# Patient Record
Sex: FEMALE | Race: WHITE | Employment: OTHER | URBAN - METROPOLITAN AREA
[De-identification: names, ages, dates, MRNs, and addresses within clinical notes are randomized per-mention and may not be internally consistent; named-entity substitution may affect disease eponyms.]

---

## 2017-05-12 ENCOUNTER — EMPLOYEE WELLNESS (OUTPATIENT)
Dept: OTHER | Age: 57
End: 2017-05-12

## 2017-05-12 LAB
CHOLESTEROL, TOTAL: 190 MG/DL (ref 0–199)
GLUCOSE BLD-MCNC: 78 MG/DL (ref 74–107)
HDLC SERPL-MCNC: 68 MG/DL
LDL CHOLESTEROL CALCULATED: 109 MG/DL (ref 0–99)
TRIGL SERPL-MCNC: 67 MG/DL (ref 0–149)

## 2018-03-20 VITALS — WEIGHT: 163 LBS | BODY MASS INDEX: 26.31 KG/M2

## 2018-04-16 ENCOUNTER — EMPLOYEE WELLNESS (OUTPATIENT)
Dept: OTHER | Age: 58
End: 2018-04-16

## 2018-04-16 LAB
CHOLESTEROL, TOTAL: 176 MG/DL (ref 0–199)
GLUCOSE BLD-MCNC: 93 MG/DL (ref 74–107)
HDLC SERPL-MCNC: 62 MG/DL
LDL CHOLESTEROL CALCULATED: 91 MG/DL (ref 0–99)
TRIGL SERPL-MCNC: 114 MG/DL (ref 0–149)

## 2018-04-23 VITALS — WEIGHT: 164 LBS | BODY MASS INDEX: 26.07 KG/M2

## 2018-06-11 ENCOUNTER — HOSPITAL ENCOUNTER (OUTPATIENT)
Age: 58
Discharge: HOME | End: 2018-06-13
Payer: COMMERCIAL

## 2018-06-11 PROCEDURE — 88175 CYTOPATH C/V AUTO FLUID REDO: CPT

## 2018-07-23 ENCOUNTER — HOSPITAL ENCOUNTER (OUTPATIENT)
Age: 58
Discharge: HOME | End: 2018-07-23
Payer: COMMERCIAL

## 2018-07-23 LAB
ALBUMIN SERPL-MCNC: 3.8 G/DL (ref 3.5–5.2)
ALP BLD-CCNC: 75 U/L (ref 35–104)
ALT SERPL-CCNC: 24 U/L (ref 0–32)
ANION GAP SERPL CALCULATED.3IONS-SCNC: 11 MMOL/L (ref 7–16)
AST SERPL-CCNC: 20 U/L (ref 0–31)
BILIRUB SERPL-MCNC: 0.5 MG/DL (ref 0–1.2)
BILIRUBIN URINE: NEGATIVE
BLOOD, URINE: NEGATIVE
BUN BLDV-MCNC: 17 MG/DL (ref 6–20)
CALCIUM SERPL-MCNC: 9.1 MG/DL (ref 8.6–10.2)
CHLORIDE BLD-SCNC: 104 MMOL/L (ref 98–107)
CLARITY: CLEAR
CO2: 28 MMOL/L (ref 22–29)
COLOR: YELLOW
CREAT SERPL-MCNC: 1 MG/DL (ref 0.5–1)
GFR AFRICAN AMERICAN: >60
GFR NON-AFRICAN AMERICAN: 57 ML/MIN/1.73
GLUCOSE BLD-MCNC: 88 MG/DL (ref 74–109)
GLUCOSE URINE: NEGATIVE MG/DL
HCT VFR BLD CALC: 42.9 % (ref 34–48)
HEMOGLOBIN: 13.7 G/DL (ref 11.5–15.5)
KETONES, URINE: NEGATIVE MG/DL
LEUKOCYTE ESTERASE, URINE: NEGATIVE
MCH RBC QN AUTO: 29.3 PG (ref 26–35)
MCHC RBC AUTO-ENTMCNC: 31.9 % (ref 32–34.5)
MCV RBC AUTO: 91.9 FL (ref 80–99.9)
NITRITE, URINE: NEGATIVE
PDW BLD-RTO: 12.5 FL (ref 11.5–15)
PH UA: 7.5 (ref 5–9)
PLATELET # BLD: 251 E9/L (ref 130–450)
PMV BLD AUTO: 10 FL (ref 7–12)
POTASSIUM SERPL-SCNC: 4.5 MMOL/L (ref 3.5–5)
PROTEIN UA: NEGATIVE MG/DL
RBC # BLD: 4.67 E12/L (ref 3.5–5.5)
SODIUM BLD-SCNC: 143 MMOL/L (ref 132–146)
SPECIFIC GRAVITY UA: 1.01 (ref 1–1.03)
TOTAL PROTEIN: 6.7 G/DL (ref 6.4–8.3)
UROBILINOGEN, URINE: 0.2 E.U./DL
WBC # BLD: 6 E9/L (ref 4.5–11.5)

## 2018-07-23 PROCEDURE — 80053 COMPREHEN METABOLIC PANEL: CPT

## 2018-07-23 PROCEDURE — 81003 URINALYSIS AUTO W/O SCOPE: CPT

## 2018-07-23 PROCEDURE — 85027 COMPLETE CBC AUTOMATED: CPT

## 2018-07-23 PROCEDURE — 36415 COLL VENOUS BLD VENIPUNCTURE: CPT

## 2018-10-02 ENCOUNTER — HOSPITAL ENCOUNTER (OUTPATIENT)
Dept: GENERAL RADIOLOGY | Age: 58
Discharge: HOME | End: 2018-10-04
Payer: COMMERCIAL

## 2018-10-02 DIAGNOSIS — Z12.39 SCREENING FOR BREAST CANCER: ICD-10-CM

## 2018-10-02 PROCEDURE — 77063 BREAST TOMOSYNTHESIS BI: CPT

## 2019-01-07 ENCOUNTER — HOSPITAL ENCOUNTER (OUTPATIENT)
Age: 59
Discharge: HOME | End: 2019-01-07
Payer: COMMERCIAL

## 2019-01-07 LAB
BUN BLDV-MCNC: 21 MG/DL (ref 6–20)
CREAT SERPL-MCNC: 1.1 MG/DL (ref 0.5–1)
GFR AFRICAN AMERICAN: >60
GFR NON-AFRICAN AMERICAN: 51 ML/MIN/1.73
MAGNESIUM: 2.3 MG/DL (ref 1.6–2.6)

## 2019-01-07 PROCEDURE — 82565 ASSAY OF CREATININE: CPT

## 2019-01-07 PROCEDURE — 36415 COLL VENOUS BLD VENIPUNCTURE: CPT

## 2019-01-07 PROCEDURE — 83735 ASSAY OF MAGNESIUM: CPT

## 2019-01-07 PROCEDURE — 84520 ASSAY OF UREA NITROGEN: CPT

## 2019-01-29 ENCOUNTER — HOSPITAL ENCOUNTER (OUTPATIENT)
Age: 59
Discharge: HOME | End: 2019-01-29
Payer: COMMERCIAL

## 2019-01-29 LAB
BUN BLDV-MCNC: 21 MG/DL (ref 6–20)
CREAT SERPL-MCNC: 1 MG/DL (ref 0.5–1)
GFR AFRICAN AMERICAN: >60
GFR NON-AFRICAN AMERICAN: 57 ML/MIN/1.73

## 2019-01-29 PROCEDURE — 82565 ASSAY OF CREATININE: CPT

## 2019-01-29 PROCEDURE — 36415 COLL VENOUS BLD VENIPUNCTURE: CPT

## 2019-01-29 PROCEDURE — 84520 ASSAY OF UREA NITROGEN: CPT

## 2019-02-12 ENCOUNTER — HOSPITAL ENCOUNTER (OUTPATIENT)
Dept: ULTRASOUND IMAGING | Age: 59
Discharge: HOME | End: 2019-02-14
Payer: COMMERCIAL

## 2019-02-12 DIAGNOSIS — N18.9 CHRONIC RENAL IMPAIRMENT, UNSPECIFIED CKD STAGE: ICD-10-CM

## 2019-02-12 PROCEDURE — 76770 US EXAM ABDO BACK WALL COMP: CPT

## 2019-04-08 ENCOUNTER — EMPLOYEE WELLNESS (OUTPATIENT)
Dept: OTHER | Age: 59
End: 2019-04-08

## 2019-04-08 LAB
CHOLESTEROL, TOTAL: 167 MG/DL (ref 0–199)
GLUCOSE BLD-MCNC: 86 MG/DL (ref 74–107)
HDLC SERPL-MCNC: 51 MG/DL
LDL CHOLESTEROL CALCULATED: 95 MG/DL (ref 0–99)
TRIGL SERPL-MCNC: 106 MG/DL (ref 0–149)

## 2019-04-15 VITALS — WEIGHT: 166 LBS | BODY MASS INDEX: 26.79 KG/M2

## 2019-06-17 ENCOUNTER — HOSPITAL ENCOUNTER (OUTPATIENT)
Age: 59
Discharge: HOME | End: 2019-06-19
Payer: COMMERCIAL

## 2019-06-17 PROCEDURE — 88175 CYTOPATH C/V AUTO FLUID REDO: CPT

## 2020-04-30 ENCOUNTER — HOSPITAL ENCOUNTER (OUTPATIENT)
Age: 60
Discharge: HOME | End: 2020-05-02
Payer: COMMERCIAL

## 2020-04-30 PROCEDURE — U0003 INFECTIOUS AGENT DETECTION BY NUCLEIC ACID (DNA OR RNA); SEVERE ACUTE RESPIRATORY SYNDROME CORONAVIRUS 2 (SARS-COV-2) (CORONAVIRUS DISEASE [COVID-19]), AMPLIFIED PROBE TECHNIQUE, MAKING USE OF HIGH THROUGHPUT TECHNOLOGIES AS DESCRIBED BY CMS-2020-01-R: HCPCS

## 2020-05-02 LAB
SARS-COV-2: NOT DETECTED
SOURCE: NORMAL

## 2020-06-24 ENCOUNTER — HOSPITAL ENCOUNTER (OUTPATIENT)
Age: 60
Discharge: HOME | End: 2020-06-26
Payer: COMMERCIAL

## 2020-06-24 PROCEDURE — 88175 CYTOPATH C/V AUTO FLUID REDO: CPT

## 2020-10-05 ENCOUNTER — HOSPITAL ENCOUNTER (OUTPATIENT)
Age: 60
Discharge: HOME | End: 2020-10-07
Payer: COMMERCIAL

## 2020-10-05 ENCOUNTER — HOSPITAL ENCOUNTER (OUTPATIENT)
Dept: GENERAL RADIOLOGY | Age: 60
Discharge: HOME | End: 2020-10-07
Payer: COMMERCIAL

## 2020-10-05 ENCOUNTER — HOSPITAL ENCOUNTER (OUTPATIENT)
Age: 60
Discharge: HOME | End: 2020-10-05
Payer: COMMERCIAL

## 2020-10-05 LAB
ALBUMIN SERPL-MCNC: 3.8 G/DL (ref 3.5–5.2)
ALP BLD-CCNC: 73 U/L (ref 35–104)
ALT SERPL-CCNC: 21 U/L (ref 0–32)
ANION GAP SERPL CALCULATED.3IONS-SCNC: 8 MMOL/L (ref 7–16)
AST SERPL-CCNC: 23 U/L (ref 0–31)
BILIRUB SERPL-MCNC: 0.6 MG/DL (ref 0–1.2)
BILIRUBIN URINE: NEGATIVE
BLOOD, URINE: NEGATIVE
BUN BLDV-MCNC: 20 MG/DL (ref 8–23)
CALCIUM SERPL-MCNC: 9.3 MG/DL (ref 8.6–10.2)
CHLORIDE BLD-SCNC: 104 MMOL/L (ref 98–107)
CLARITY: CLEAR
CO2: 27 MMOL/L (ref 22–29)
COLOR: YELLOW
CREAT SERPL-MCNC: 1.1 MG/DL (ref 0.5–1)
GFR AFRICAN AMERICAN: >60
GFR NON-AFRICAN AMERICAN: 51 ML/MIN/1.73
GLUCOSE BLD-MCNC: 95 MG/DL (ref 74–99)
GLUCOSE URINE: NEGATIVE MG/DL
HCT VFR BLD CALC: 44 % (ref 34–48)
HEMOGLOBIN: 14.8 G/DL (ref 11.5–15.5)
KETONES, URINE: NEGATIVE MG/DL
LEUKOCYTE ESTERASE, URINE: NEGATIVE
MCH RBC QN AUTO: 31.2 PG (ref 26–35)
MCHC RBC AUTO-ENTMCNC: 33.6 % (ref 32–34.5)
MCV RBC AUTO: 92.6 FL (ref 80–99.9)
NITRITE, URINE: NEGATIVE
PDW BLD-RTO: 11.9 FL (ref 11.5–15)
PH UA: 7.5 (ref 5–9)
PLATELET # BLD: 167 E9/L (ref 130–450)
PMV BLD AUTO: 10.2 FL (ref 7–12)
POTASSIUM SERPL-SCNC: 4.5 MMOL/L (ref 3.5–5)
PROTEIN UA: NEGATIVE MG/DL
RBC # BLD: 4.75 E12/L (ref 3.5–5.5)
SODIUM BLD-SCNC: 139 MMOL/L (ref 132–146)
SPECIFIC GRAVITY UA: 1.01 (ref 1–1.03)
TOTAL PROTEIN: 7.1 G/DL (ref 6.4–8.3)
UROBILINOGEN, URINE: 0.2 E.U./DL
WBC # BLD: 5.2 E9/L (ref 4.5–11.5)

## 2020-10-05 PROCEDURE — 73560 X-RAY EXAM OF KNEE 1 OR 2: CPT

## 2020-10-05 PROCEDURE — 81003 URINALYSIS AUTO W/O SCOPE: CPT

## 2020-10-05 PROCEDURE — 85027 COMPLETE CBC AUTOMATED: CPT

## 2020-10-05 PROCEDURE — 80053 COMPREHEN METABOLIC PANEL: CPT

## 2020-10-05 PROCEDURE — 73502 X-RAY EXAM HIP UNI 2-3 VIEWS: CPT

## 2020-10-05 PROCEDURE — 36415 COLL VENOUS BLD VENIPUNCTURE: CPT

## 2020-11-16 ENCOUNTER — HOSPITAL ENCOUNTER (OUTPATIENT)
Dept: GENERAL RADIOLOGY | Age: 60
Discharge: HOME | End: 2020-11-18
Payer: COMMERCIAL

## 2020-11-16 PROCEDURE — 77063 BREAST TOMOSYNTHESIS BI: CPT

## 2020-11-18 ENCOUNTER — HOSPITAL ENCOUNTER (OUTPATIENT)
Age: 60
Discharge: HOME | End: 2020-11-18
Payer: COMMERCIAL

## 2020-11-18 LAB
BUN BLDV-MCNC: 16 MG/DL (ref 8–23)
CREAT SERPL-MCNC: 1.1 MG/DL (ref 0.5–1)
GFR AFRICAN AMERICAN: >60
GFR NON-AFRICAN AMERICAN: 51 ML/MIN/1.73

## 2020-11-18 PROCEDURE — 84520 ASSAY OF UREA NITROGEN: CPT

## 2020-11-18 PROCEDURE — 36415 COLL VENOUS BLD VENIPUNCTURE: CPT

## 2020-11-18 PROCEDURE — 82565 ASSAY OF CREATININE: CPT

## 2021-09-09 LAB
CHOLESTEROL, TOTAL: 181 MG/DL (ref 0–199)
GLUCOSE BLD-MCNC: 87 MG/DL (ref 74–107)
HDLC SERPL-MCNC: 63 MG/DL
LDL CHOLESTEROL CALCULATED: 101 MG/DL (ref 0–99)
TRIGL SERPL-MCNC: 87 MG/DL (ref 0–149)

## 2021-11-24 ENCOUNTER — HOSPITAL ENCOUNTER (OUTPATIENT)
Dept: GENERAL RADIOLOGY | Age: 61
Discharge: HOME | End: 2021-11-26
Payer: COMMERCIAL

## 2021-11-24 DIAGNOSIS — Z12.31 BREAST CANCER SCREENING BY MAMMOGRAM: ICD-10-CM

## 2021-11-24 PROCEDURE — 77063 BREAST TOMOSYNTHESIS BI: CPT

## 2021-12-29 ENCOUNTER — NURSE ONLY (OUTPATIENT)
Dept: PRIMARY CARE CLINIC | Age: 61
End: 2021-12-29

## 2022-08-29 ENCOUNTER — HOSPITAL ENCOUNTER (OUTPATIENT)
Dept: GENERAL RADIOLOGY | Age: 62
Discharge: HOME | End: 2022-08-31
Payer: COMMERCIAL

## 2022-08-29 ENCOUNTER — HOSPITAL ENCOUNTER (OUTPATIENT)
Age: 62
Discharge: HOME | End: 2022-08-31
Payer: COMMERCIAL

## 2022-08-29 DIAGNOSIS — R52 PAIN: ICD-10-CM

## 2022-08-29 PROCEDURE — 73030 X-RAY EXAM OF SHOULDER: CPT

## 2022-08-29 PROCEDURE — 73560 X-RAY EXAM OF KNEE 1 OR 2: CPT

## 2022-09-06 ENCOUNTER — EVALUATION (OUTPATIENT)
Dept: PHYSICAL THERAPY | Age: 62
End: 2022-09-06
Payer: COMMERCIAL

## 2022-09-06 DIAGNOSIS — M25.561 RIGHT KNEE PAIN, UNSPECIFIED CHRONICITY: ICD-10-CM

## 2022-09-06 DIAGNOSIS — M25.511 RIGHT SHOULDER PAIN, UNSPECIFIED CHRONICITY: Primary | ICD-10-CM

## 2022-09-06 PROCEDURE — 97110 THERAPEUTIC EXERCISES: CPT | Performed by: PHYSICAL THERAPIST

## 2022-09-06 PROCEDURE — 97161 PT EVAL LOW COMPLEX 20 MIN: CPT | Performed by: PHYSICAL THERAPIST

## 2022-09-06 NOTE — PROGRESS NOTES
[facility]          Phone: [phone] Fax: [phone]    Physical Therapy Daily Treatment Note  Date:  2022    Patient Name:  Alyce Gómez    :  1960  MRN: [id number]    Evaluating Therapist:  Carito Bravo PT [id number]    Restrictions/Precautions:    Diagnosis:     Diagnosis Orders   1. Right shoulder pain, unspecified chronicity        2. Right knee pain, unspecified chronicity          Treatment Diagnosis:    Insurance/Certification information:  [facility]  Referring Physician:  Niraj Patel MD  Plan of care signed (Y/N):    Visit# / total visits:    Pain level: 0-7/10   Time In:  1005  Time Out:  1040    Subjective:  See evaluation    Exercises:  Exercise/Equipment Resistance/Repetitions Other comments     Bike with arms       Hamstring stretch with PFB       Quad stretch with PFB       Quad sets       SAQ       SLR       Sit to stand                     Corner stretch          pulleys       Shoulder IR/ER       rows       Front and lateral raises                                          Other Therapeutic Activities:  PT evaluation completed. Pt educated in below exercises for HEP.     Home Exercise Program:  22 - hamstring and quad stretches with strap, corner stretch    Manual Treatments:  N/A    Modalities:  N/A     Time-in Time-out Total Time   84409  Evaluation Low Complexity 1005 1030 25   38059  Evaluation Med Complexity      92642  Evaluation High Complexity      20232  Ther Ex 1030 1040 10   99939  Neuro Re-ed        48326  Ther Activities        99809  Manual Therapy       00154  E-stim       66680  Ultrasound            Session 7686 4485 35       Treatment/Activity Tolerance:  [x] Patient tolerated treatment well [] Patient limited by fatigue  [] Patient limited by pain  [] Patient limited by other medical complications  [] Other:     Prognosis: [x] Good [] Fair  [] Poor    Patient Requires Follow-up: [x] Yes  [] No    Plan:   [] Continue per plan of care [] Alter current plan (see comments)  [x] Plan of care initiated [] Hold pending MD visit [] Discharge  Plan for Next Session:        Electronically signed by:  Wyatt Zavala, [address] [facility], [id number]

## 2022-09-06 NOTE — PROGRESS NOTES
[facility]   Phone: [phone]   Fax: [phone]           Date:  2022   Patient: Daniella Ontiveros  : 1960  MRN: [id number]  Referring Provider: No referring provider defined for this encounter. Medical Diagnosis:      Diagnosis Orders   1. Right shoulder pain, unspecified chronicity        2. Right knee pain, unspecified chronicity             SUBJECTIVE:     Onset date: 22    Onset[de-identified] Insidious onset    Mechanism of Injury / History: Pt reports she has been having pain in the right shoulder and knee for the past year with recent worsening of symptoms. Pt reports a h/o fall in  and diagnosed with patellofemoral syndrome in . Patient is right handed. Previous PT: none    Medical Management for Current Problem:  Prednisone    Chief complaint: pain, inability / limited ability to use arm, difficulty with stairs    Behavior: condition is getting worse    Pain: intermittent  Current: 0/10     Best: 0/10     Worst:7/10    Aggravated by: reaching behind back, steps    Relieved by: rest, medication    Symptom Type/Quality: N/A  Location[de-identified] anterior shoulder, patella    Imaging results: XR SHOULDER RIGHT (MIN 2 VIEWS)    Result Date: 2022  EXAMINATION: THREE XRAY VIEWS OF THE RIGHT SHOULDER 2022 7:54 pm COMPARISON: 2010 HISTORY: ORDERING SYSTEM PROVIDED HISTORY: Pain FINDINGS: Mild osteoarthritis at the [facility] joint. No fracture or dislocation. No other abnormal osseous or soft tissue findings. Mild AC joint osteoarthritis. XR KNEE RIGHT (1-2 VIEWS)    Result Date: 2022  EXAMINATION: TWO XRAY VIEWS OF THE RIGHT KNEE 2022 7:54 pm COMPARISON: 2020 HISTORY: ORDERING SYSTEM PROVIDED HISTORY: Pain FINDINGS: Very mild osteoarthritis at the medial weight-bearing and patellofemoral compartments. No fracture or dislocation. No other abnormal osseous or soft tissue findings.      Mild osteoarthritis at the medial weight-bearing and patellofemoral compartments. Past Medical History:  Past Medical History:   Diagnosis Date    Abnormal Pap smear of cervix 2011 apx    Arthritis     cervical    Chronic back pain     Depression     GALEANA (dyspnea on exertion)     Gastritis     GERD (gastroesophageal reflux disease)     Hiatal hernia 3/11/2015    Osteoarthritis     Palpitations     PONV (postoperative nausea and vomiting)     Shingles 2007    Syncope 2012    TMJ (dislocation of temporomandibular joint)      Past Surgical History:   Procedure Laterality Date    [address], LAPAROSCOPIC  2003    COLONOSCOPY  3/11/15    Dr. Farheen Slade  2011    ENDOSCOPY, COLON, DIAGNOSTIC  2003, 2015 2015 with colonoscopy dr. Wilma Davies    HYSTEROSCOPY  09/08/2016    and D&C     LAPAROSCOPY  1989    ovarian cysts    LASIK  1998    LUMBAR DISC SURGERY      L1-2 bulging disc. TONSILLECTOMY AND ADENOIDECTOMY  1985    WISDOM TOOTH EXTRACTION  1983    x 4       Medications:   Current Outpatient Medications   Medication Sig Dispense Refill    Levocetirizine Dihydrochloride (XYZAL PO) Take by mouth      magnesium 200 MG TABS tablet Take 200 mg by mouth daily Last dose 9-6-16      b complex vitamins capsule Take 1 capsule by mouth daily Last dose 9-6-16      Triamcinolone Acetonide (NASACORT ALLERGY 24HR NA) 1 Inhaler by Nasal route Indications: prn       pseudoephedrine (SUDAFED) 30 MG tablet Take 30 mg by mouth every 4 hours as needed for Congestion (prn)       Calcium Carb-Cholecalciferol (CALCIUM + D3) 600-200 MG-UNIT TABS Take by mouth      Calcium Carbonate Antacid (MAALOX PO) Take  by mouth as needed (NIGHTLY AND PRN). therapeutic multivitamin-minerals (THERAGRAN-M) tablet Take 1 tablet by mouth daily Last dose 9-6-16       No current facility-administered medications for this visit. Occupation: retired. Exercise regimen:  stretching, Treadmill    Hobbies: yard work    Patient Goals: pain relief    Precautions/Contraindications: none    OBJECTIVE:     Observations: well nourished female    Gait: no deviations noted    Inspection: right scapular winging. Palpation: N/A     Joint/Motion:  Right Shoulder:  AROM: WFL throughout     Right knee: WFL      Left Shoulder:  AROM: WFL throughout     Left knee: WFL    Strength:  Right Shoulder: Flexion 4/5,  Abduction 4/5, ER 4/5, IR 4/5    Right Elbow:Flexion 5/5,  Extension 5/5    Right hip: 4/5; Right knee: 4/5    Left Shoulder: Flexion 5/5,  Abduction 5/5, ER 5/5, IR 5/5   Left Elbow:Flexion 5/5,  Extension 5/5    Left hip: 5/5; Left knee: 5/5    Special Tests/Functional Screens:    [] Jermaine []+ / [] -  [] [place]'s []+ / [] -   [] MIKE Clay []+ / [] -    [] [address] [facility] drawer []+ / [] -    [] Bicep Load []+ / [] -   [] Crank []+ / [] -  [] Clunk []+ / [] -  [] Lavone Kail []+ / [] -   [] Earney Exon []+ / [] -  [] Neer's []+ / [] -      [] Speed's []+ / [] -   [] Seamus's []+ / [] -    [] Sulcus Sign []+ / [] -   [] Apprehension []+ / [] -   [] Bicep Load II []+ / [] -   [] Elbow Valgus []+ / [] -     [] Elbow Varus []+ / [] -   [] Yajaira's relocation []+ / [] -   [] Empty Can []+ / [] -  [] Drop arm []+ / [] -  [] ER lag []+ / [] -  [] Painful Arc []+ / [] -  [] Debbie Oliva []+ / [] -  [] Belly Press/ lift off[]+ / [] -  [] Other: []+ / [] -       Special Test Comments:  N/A    ASSESSMENT     Outcome Measure:   QuickDASH (Disorders of the Arm, Shoulder, and Hand) 34.1% disability: LEFS: 50/80    Problems:   Pain reported 0-7/10  Strength decreased  Decreased functional ability with stairs, use of right upper extremity, reaching, squatting, yard work, walking on treadmill    Reason for Skilled Care: Pt with worsening right shoulder and right knee pain which is limiting pt tolerance to recreational and functional activities and decreasing pt quality of life.     [x] There are no barriers affecting plan of care or recovery    [] Barriers to this patient's plan of care or recovery include. Domestic Concerns:  [x] No  [] Yes:    Long Term goals (4 weeks)  Decrease reported pain to 0-3/10  Increase Strength to 4+ to 5/5 throughout   QuickDASH 20% disability  LEFS: 60/80  Independent with Home Exercise Programs    Rehab Potential: [x] Good  [] Fair  [] Poor    PLAN       Treatment Plan:   [x] Therapeutic Exercise  [x] Therapeutic Activity  [x] Neuromuscular Re-education   [] Gait Training  [] Balance Training  [] Aerobic conditioning  [x] Manual Therapy  [] Massage/Fascial release   [] Work/Sport specific activities    [] Pain Neuroscience [x] Cold/hotpack  [] Vasocompression  [x] Electrical Stimulation  [] Lumbar/Cervical Traction  [x] Ultrasound   [] Iontophoresis: 4 mg/mL Dexamethasone Sodium Phosphate 40-80 mAmin  [] Dry Needling      [x] Instruction in HEP      []  Medication allergies reviewed for use of Dexamethasone Sodium Phosphate 4mg/ml  with iontophoresis treatments. Patient is not allergic. The following CPT codes are likely to be used in the care of this patient: 31863 PT Evaluation: Low Complexity, 33249 PT Re-Evaluation, 59294 Therapeutic Exercise, 55674 Neuromuscular Re-Education, 96426 Therapeutic Activities, 14791 Manual Therapy, 81616 Electric Stimulation, and 55188 Ultrasound      Suggested Professional Referral: [x] No  [] Yes:     Patient Education:  [x] Plans/Goals, Risks/Benefits discussed  [x] Home exercise program  Method of Education: [x] Verbal  [x] Demo  [x] Written  Comprehension of Education:  [x] Verbalizes understanding. [x] Demonstrates understanding. [] Needs Review. [] Demonstrates/verbalizes understanding of HEP/Ed previously given.     Frequency:  2 days per week for 4 weeks    Patient understands diagnosis/prognosis and consents to treatment, plan and goals: [x] Yes    [] No     Thank you for the opportunity to work with your patient. If you have questions or comments, please contact me at numbers listed above. Electronically signed by: Lata Patterson, [id number]    Medicare Patients Only     Please sign Physician's Certification and return to: Jignesh Bearden  [facility]  [address] [facility] [id number]  Dept: [phone]  Dept Fax: [phone]  Loc: [phone] Certification / Comments     Frequency/Duration 2 days per week for 4 weeks. Certification period from 9/6/2022  to 12/5/22. I have reviewed the Plan of Care established for skilled therapy services and certify that the services are required and that they will be provided while the patient is under my care.     Physician's Comments/Revisions:               Physician's Printed Name:                                           [de-identified] Signature:                                                               Date:

## 2022-09-12 ENCOUNTER — TREATMENT (OUTPATIENT)
Dept: PHYSICAL THERAPY | Age: 62
End: 2022-09-12
Payer: COMMERCIAL

## 2022-09-12 DIAGNOSIS — M25.511 RIGHT SHOULDER PAIN, UNSPECIFIED CHRONICITY: Primary | ICD-10-CM

## 2022-09-12 DIAGNOSIS — M25.561 RIGHT KNEE PAIN, UNSPECIFIED CHRONICITY: ICD-10-CM

## 2022-09-12 PROCEDURE — 97110 THERAPEUTIC EXERCISES: CPT

## 2022-09-12 NOTE — PROGRESS NOTES
[facility]          Phone: [phone] Fax: [phone]    Physical Therapy Daily Treatment Note  Date:  2022    Patient Name:  Evaristo Peterson    :  1960  MRN: [id number]    Evaluating Therapist:  Marlene Cooney 372    Restrictions/Precautions:    Diagnosis:     Diagnosis Orders   1. Right shoulder pain, unspecified chronicity        2. Right knee pain, unspecified chronicity          Treatment Diagnosis:    Insurance/Certification information:  [facility]  Referring Physician:  Elvis Tello MD  Plan of care signed (Y/N):    Visit# / total visits:    Pain level: 0/10 R knee , 2/10 R shoulder   Time In:  1000  Time Out:  1043    Subjective:  pt feels the prednisone helped her knee pain    Exercises:  Exercise/Equipment Resistance/Repetitions Other comments     Bike with arms X 5 mins       Hamstring stretch with PFB 20 sec x 3 reps       Quad stretch with PFB 20 sec x 3 reps       Quad sets 5 sec x 10 reps       SAQ 3 sec x 10 reps       SLR 3 sec x 10 reps       Sit to stand X 10 from low mat                    Corner stretch   20 sec x 3 reps        pulleys 5 mins       Shoulder IR/ER RTB x 10 reps each      rows RTB x 10 reps       Front and lateral raises 1 # x 10 reps                                          Other Therapeutic Activities: tolerated program, R sh had \"soreness \" only in R shoulder after session, and .5/10 in R knee after session. Pt reported difficulty doing quad stretch n bed, as mattress edge too soft. Therapist instructed to try quad stretch in prone. Pt reported understanding.     Home Exercise Program:  22 - hamstring and quad stretches with strap, corner stretch    Manual Treatments:  N/A    Modalities:  N/A     Time-in Time-out Total Time   60150  Evaluation Low Complexity      05202  Evaluation Med Complexity      57985  Evaluation High Complexity      23174  Ther Ex 1000 1043 43   69327  Neuro Re-ed        33469  Ther Activities        80408  Manual Therapy       41834  E-stim       01652  Ultrasound            Session 8639 0189 51       Treatment/Activity Tolerance:  [x] Patient tolerated treatment well [] Patient limited by fatigue  [] Patient limited by pain  [] Patient limited by other medical complications  [] Other:     Prognosis: [x] Good [] Fair  [] Poor    Patient Requires Follow-up: [x] Yes  [] No    Plan:   [x] Continue per plan of care [] Alter current plan (see comments)  [] Plan of care initiated [] Hold pending MD visit [] Discharge  Plan for Next Session:        Electronically signed by:  Felicity Gil, PTA [id number]

## 2022-09-14 ENCOUNTER — TREATMENT (OUTPATIENT)
Dept: PHYSICAL THERAPY | Age: 62
End: 2022-09-14
Payer: COMMERCIAL

## 2022-09-14 DIAGNOSIS — M25.511 RIGHT SHOULDER PAIN, UNSPECIFIED CHRONICITY: Primary | ICD-10-CM

## 2022-09-14 DIAGNOSIS — M25.561 RIGHT KNEE PAIN, UNSPECIFIED CHRONICITY: ICD-10-CM

## 2022-09-14 PROCEDURE — 97110 THERAPEUTIC EXERCISES: CPT

## 2022-09-14 NOTE — PROGRESS NOTES
[facility]          Phone: [phone] Fax: [phone]    Physical Therapy Daily Treatment Note  Date:  2022    Patient Name:  Juan Cha    :  1960  MRN: [id number]    Evaluating Therapist:  Marlene Lomax 372    Restrictions/Precautions:    Diagnosis:     Diagnosis Orders   1. Right shoulder pain, unspecified chronicity        2. Right knee pain, unspecified chronicity          Treatment Diagnosis:    Insurance/Certification information:  [facility]  Referring Physician:  Lennox Ruder, MD  Plan of care signed (Y/N):    Visit# / total visits:  3/8  Pain level: 0/10 R knee , 0/10 R shoulder   Time In:  1000  Time Out:  1042    Subjective: pt reported residual soreness in R shoulder and R knee after last session for approximately 45 mins before resolving. Exercises:  Exercise/Equipment Resistance/Repetitions Other comments     Bike with arms X 5 mins       Hamstring stretch with PFB 20 sec x 3 reps       Quad stretch with PFB 20 sec x 3 reps       Quad sets 5 sec x 10 reps       SAQ 3 sec 2 x 10 reps       SLR 3 sec 2 x 10 reps       Sit to stand X 10 from low mat                    Corner stretch   20 sec x 3 reps        pulleys 5 mins       Shoulder IR/ER RTB x 10 reps each      rows RTB x 10 reps       Front and lateral raises 1 # x 10 reps                                          Other Therapeutic Activities: tolerated program, R sh had \"soreness \" only in R shoulder and  R knee after session. Pt continues to report difficulty doing quad stretch at home supine or prone with strap. Pt doesn't feel her leg is in the right position.  Instructed pt to discontinue this stretch at home for now to prevent injury and will continue w/ quad stretch in PT with supervision    Home Exercise Program:  22 - hamstring and quad stretches with strap, corner stretch    Manual Treatments:  N/A    Modalities:  N/A     Time-in Time-out Total Time   44336  Evaluation Low Complexity      32603  Evaluation Med Complexity      51707  Evaluation High Complexity      44031  Ther Ex 1000 1042 42   41419  Neuro Re-ed        06824  Ther Activities        24781  Manual Therapy       51509  E-stim       38366  Ultrasound            Session 1000 0815 12       Treatment/Activity Tolerance:  [x] Patient tolerated treatment well [] Patient limited by fatigue  [] Patient limited by pain  [] Patient limited by other medical complications  [] Other:     Prognosis: [x] Good [] Fair  [] Poor    Patient Requires Follow-up: [x] Yes  [] No    Plan:   [x] Continue per plan of care [] Alter current plan (see comments)  [] Plan of care initiated [] Hold pending MD visit [] Discharge  Plan for Next Session:        Electronically signed by:  Rosa Barajas, PTA [id number]

## 2022-09-15 ENCOUNTER — HOSPITAL ENCOUNTER (OUTPATIENT)
Age: 62
Discharge: HOME | End: 2022-09-15
Payer: COMMERCIAL

## 2022-09-15 LAB
ALBUMIN SERPL-MCNC: 3.8 G/DL (ref 3.5–5.2)
ALP BLD-CCNC: 65 U/L (ref 35–104)
ALT SERPL-CCNC: 31 U/L (ref 0–32)
ANION GAP SERPL CALCULATED.3IONS-SCNC: 9 MMOL/L (ref 7–16)
AST SERPL-CCNC: 20 U/L (ref 0–31)
BACTERIA: NORMAL /HPF
BILIRUB SERPL-MCNC: 0.6 MG/DL (ref 0–1.2)
BILIRUBIN URINE: NEGATIVE
BLOOD, URINE: NEGATIVE
BUN BLDV-MCNC: 22 MG/DL (ref 6–23)
CALCIUM SERPL-MCNC: 9.1 MG/DL (ref 8.6–10.2)
CHLORIDE BLD-SCNC: 105 MMOL/L (ref 98–107)
CHOLESTEROL, TOTAL: 194 MG/DL (ref 0–199)
CLARITY: CLEAR
CO2: 26 MMOL/L (ref 22–29)
COLOR: YELLOW
CREAT SERPL-MCNC: 1.1 MG/DL (ref 0.5–1)
EPITHELIAL CELLS, UA: NORMAL /HPF
GFR AFRICAN AMERICAN: >60
GFR NON-AFRICAN AMERICAN: 50 ML/MIN/1.73
GLUCOSE BLD-MCNC: 92 MG/DL (ref 74–99)
GLUCOSE URINE: NEGATIVE MG/DL
HCT VFR BLD CALC: 42.5 % (ref 34–48)
HDLC SERPL-MCNC: 68 MG/DL
HEMOGLOBIN: 14.1 G/DL (ref 11.5–15.5)
KETONES, URINE: NEGATIVE MG/DL
LDL CHOLESTEROL CALCULATED: 101 MG/DL (ref 0–99)
LEUKOCYTE ESTERASE, URINE: ABNORMAL
MCH RBC QN AUTO: 30.1 PG (ref 26–35)
MCHC RBC AUTO-ENTMCNC: 33.2 % (ref 32–34.5)
MCV RBC AUTO: 90.8 FL (ref 80–99.9)
NITRITE, URINE: NEGATIVE
PDW BLD-RTO: 13 FL (ref 11.5–15)
PH UA: 6.5 (ref 5–9)
PLATELET # BLD: 231 E9/L (ref 130–450)
PMV BLD AUTO: 9 FL (ref 7–12)
POTASSIUM SERPL-SCNC: 4.4 MMOL/L (ref 3.5–5)
PROTEIN UA: NEGATIVE MG/DL
RBC # BLD: 4.68 E12/L (ref 3.5–5.5)
RBC UA: NORMAL /HPF (ref 0–2)
SODIUM BLD-SCNC: 140 MMOL/L (ref 132–146)
SPECIFIC GRAVITY UA: 1.01 (ref 1–1.03)
TOTAL PROTEIN: 6.3 G/DL (ref 6.4–8.3)
TRIGL SERPL-MCNC: 124 MG/DL (ref 0–149)
UROBILINOGEN, URINE: 0.2 E.U./DL
VLDLC SERPL CALC-MCNC: 25 MG/DL
WBC # BLD: 12.4 E9/L (ref 4.5–11.5)
WBC UA: NORMAL /HPF (ref 0–5)

## 2022-09-15 PROCEDURE — 36415 COLL VENOUS BLD VENIPUNCTURE: CPT

## 2022-09-15 PROCEDURE — 85027 COMPLETE CBC AUTOMATED: CPT

## 2022-09-15 PROCEDURE — 81001 URINALYSIS AUTO W/SCOPE: CPT

## 2022-09-15 PROCEDURE — 80061 LIPID PANEL: CPT

## 2022-09-15 PROCEDURE — 80053 COMPREHEN METABOLIC PANEL: CPT

## 2022-09-19 ENCOUNTER — TREATMENT (OUTPATIENT)
Dept: PHYSICAL THERAPY | Age: 62
End: 2022-09-19
Payer: COMMERCIAL

## 2022-09-19 DIAGNOSIS — M25.561 RIGHT KNEE PAIN, UNSPECIFIED CHRONICITY: ICD-10-CM

## 2022-09-19 DIAGNOSIS — M25.511 RIGHT SHOULDER PAIN, UNSPECIFIED CHRONICITY: Primary | ICD-10-CM

## 2022-09-19 PROCEDURE — 97110 THERAPEUTIC EXERCISES: CPT

## 2022-09-19 NOTE — PROGRESS NOTES
[facility]          Phone: [phone] Fax: [phone]    Physical Therapy Daily Treatment Note  Date:  2022    Patient Name:  Hayley Richardson    :  1960  MRN: [id number]    Evaluating Therapist:  Marlene Mitchell 372    Restrictions/Precautions:    Diagnosis:     Diagnosis Orders   1. Right shoulder pain, unspecified chronicity        2. Right knee pain, unspecified chronicity          Treatment Diagnosis:    Insurance/Certification information:  [facility]  Referring Physician:  Nanci Cowden, MD  Plan of care signed (Y/N):    Visit# / total visits:    Pain level: 0/10 R knee , 0/10 R shoulder   Time In: 1000  Time Out:  1049    Subjective: pt reported residual soreness in R shoulder and R knee after last session     Exercises:  Exercise/Equipment Resistance/Repetitions Other comments     Bike with arms X 8 mins       Hamstring stretch with PFB 20 sec x 3 reps       Quad stretch with PFB 20 sec x 3 reps       Quad sets 5 sec x 10 reps       SAQ 3 sec 2 x 10 reps       SLR 3 sec 2 x 10 reps       Sit to stand X 10 from low mat                    Corner stretch   20 sec x 3 reps        pulleys 5 mins       Shoulder IR/ER RTB x 15 reps each      rows RTB x 15 reps       Front and lateral raises 1 # x 10 reps                                          Other Therapeutic Activities: tolerated program, R sh 3/10 ,and  0/10 R knee after session. Pt reported some knee pain after getting off of the bike, but it ceased as session continued.      Home Exercise Program:  22 - hamstring and quad stretches with strap, corner stretch    Manual Treatments:  N/A    Modalities:  N/A     Time-in Time-out Total Time   56612  Evaluation Low Complexity      46507  Evaluation Med Complexity      46178  Evaluation High Complexity      22063  Ther Ex 1000 1049 49   91562  Neuro Re-ed        [address]  Ther Activities        51430  Manual Therapy       98840  E-stim       64587 Ultrasound            Session 1093 6731 84       Treatment/Activity Tolerance:  [x] Patient tolerated treatment well [] Patient limited by fatigue  [] Patient limited by pain  [] Patient limited by other medical complications  [] Other:     Prognosis: [x] Good [] Fair  [] Poor    Patient Requires Follow-up: [x] Yes  [] No    Plan:   [x] Continue per plan of care [] Alter current plan (see comments)  [] Plan of care initiated [] Hold pending MD visit [] Discharge  Plan for Next Session:        Electronically signed by:  Emily Farfan, ERNESTO 4510

## 2022-09-26 ENCOUNTER — TREATMENT (OUTPATIENT)
Dept: PHYSICAL THERAPY | Age: 62
End: 2022-09-26
Payer: COMMERCIAL

## 2022-09-26 DIAGNOSIS — M25.511 RIGHT SHOULDER PAIN, UNSPECIFIED CHRONICITY: Primary | ICD-10-CM

## 2022-09-26 DIAGNOSIS — M25.561 RIGHT KNEE PAIN, UNSPECIFIED CHRONICITY: ICD-10-CM

## 2022-09-26 PROCEDURE — 97110 THERAPEUTIC EXERCISES: CPT

## 2022-09-26 NOTE — PROGRESS NOTES
[facility]          Phone: [phone] Fax: [phone]    Physical Therapy Daily Treatment Note  Date:  2022    Patient Name:  Cynthia Badillo    :  1960  MRN: [id number]    Evaluating Therapist:  Marlene Vaughan 372    Restrictions/Precautions:    Diagnosis:     Diagnosis Orders   1. Right shoulder pain, unspecified chronicity        2. Right knee pain, unspecified chronicity          Treatment Diagnosis:    Insurance/Certification information:  [facility]  Referring Physician:  Anabela Alcantara MD  Plan of care signed (Y/N):    Visit# / total visits:    Pain level: 0/10 R knee , 0/10 R shoulder   Time In: 1002  Time Out:  1055    Subjective: pt reported she only has pain in her R knee susie performing steps, 3-4/10, and only has pain in R sh if reaching behind to grab something, or reaching for something in cabinet, but she didn't rate the pain in sh when this occurs.      Exercises:  Exercise/Equipment Resistance/Repetitions Other comments     Bike with arms X 10 mins       Hamstring stretch with PFB 20 sec x 3 reps       Quad stretch with PFB 20 sec x 3 reps       Quad sets 5 sec x 10 reps       SAQ 1.5 # 3 sec 2 x 10 reps       SLR 1.5 #  2 x 10 reps       Sit to stand X 10 from low mat                    Corner stretch   20 sec x 3 reps        pulleys 5 mins       Shoulder IR/ER RTB x 15 reps each      rows RTB 3 sec x 15 reps       Front and lateral raises 1 # x 10 reps                                          Other Therapeutic Activities: tolerated program , did not rate pain after session     Home Exercise Program:  22 - hamstring and quad stretches with strap, corner stretch    Manual Treatments:  N/A    Modalities:  N/A     Time-in Time-out Total Time   18470  Evaluation Low Complexity      02754  Evaluation Med Complexity      99118  Evaluation High Complexity      10418  Ther Ex 4020 5545 11   89795  Neuro Re-ed        64884  Ther Activities

## 2022-09-30 ENCOUNTER — TREATMENT (OUTPATIENT)
Dept: PHYSICAL THERAPY | Age: 62
End: 2022-09-30
Payer: COMMERCIAL

## 2022-09-30 DIAGNOSIS — M25.511 RIGHT SHOULDER PAIN, UNSPECIFIED CHRONICITY: Primary | ICD-10-CM

## 2022-09-30 DIAGNOSIS — M25.561 RIGHT KNEE PAIN, UNSPECIFIED CHRONICITY: ICD-10-CM

## 2022-09-30 PROCEDURE — 97110 THERAPEUTIC EXERCISES: CPT | Performed by: PHYSICAL THERAPIST

## 2022-09-30 NOTE — PROGRESS NOTES
[facility]          Phone: [phone] Fax: [phone]    Physical Therapy Daily Treatment Note  Date:  2022    Patient Name:  Taz Teixeira    :  1960  MRN: [id number]    Evaluating Therapist:  Marlene Kinsey 372    Restrictions/Precautions:    Diagnosis:     Diagnosis Orders   1. Right shoulder pain, unspecified chronicity        2. Right knee pain, unspecified chronicity          Treatment Diagnosis:    Insurance/Certification information:  [facility]  Referring Physician:  Loree Michelle MD  Plan of care signed (Y/N):    Visit# / total visits:    Pain level: 3-410 R knee , 3-410 R shoulder   Time In: 1047  Time Out:  1128    Subjective: pt reports that she can tell that the prednisone is wearing off and she is having some more pain.      Exercises:  Exercise/Equipment Resistance/Repetitions Other comments     Bike with arms X 10 mins       Hamstring stretch with PFB 20 sec x 3 reps       Quad stretch with PFB 20 sec x 3 reps       Quad sets 5 sec x 10 reps       SAQ 1.5 # 3 sec x 10 reps       SLR 1.5 #  x 10 reps       Sit to stand X 10 from chair                    Corner stretch   20 sec x 3 reps        pulleys 5 mins       Shoulder IR/ER RTB x 10 reps each      rows RTB 3 sec x 10 reps                                             Other Therapeutic Activities: tolerated program , did not rate pain after session     Home Exercise Program:  22 - hamstring and quad stretches with strap, corner stretch    Manual Treatments:  N/A    Modalities:  N/A     Time-in Time-out Total Time   24569  Evaluation Low Complexity      84142  Evaluation Med Complexity      12044  Evaluation High Complexity      44538  Ther Ex 1047 1128 41   47311  Neuro Re-ed          Ther Activities        46012  Manual Therapy       28043  E-stim       87891  Ultrasound            Session 0847 6522 41       Treatment/Activity Tolerance:  [x] Patient tolerated treatment well [] Patient limited by fatigue  [] Patient limited by pain  [] Patient limited by other medical complications  [x] Other: decreased some exercises due to increased pain reported today    Prognosis: [x] Good [] Fair  [] Poor    Patient Requires Follow-up: [x] Yes  [] No    Plan:   [x] Continue per plan of care [] Alter current plan (see comments)  [] Plan of care initiated [] Hold pending MD visit [] Discharge  Plan for Next Session:        Electronically signed by:  Marlene Red [place] [id number]

## 2022-10-03 ENCOUNTER — TREATMENT (OUTPATIENT)
Dept: PHYSICAL THERAPY | Age: 62
End: 2022-10-03
Payer: COMMERCIAL

## 2022-10-03 DIAGNOSIS — M25.561 RIGHT KNEE PAIN, UNSPECIFIED CHRONICITY: ICD-10-CM

## 2022-10-03 DIAGNOSIS — M25.511 RIGHT SHOULDER PAIN, UNSPECIFIED CHRONICITY: Primary | ICD-10-CM

## 2022-10-03 PROCEDURE — 97110 THERAPEUTIC EXERCISES: CPT

## 2022-10-03 NOTE — PROGRESS NOTES
[facility]          Phone: [phone] Fax: [phone]    Physical Therapy Daily Treatment Note  Date:  10/3/2022    Patient Name:  Roseline Espinosa    :  1960  MRN: [id number]    Evaluating Therapist:  Jessica Corral, ORI [id number]    Restrictions/Precautions:    Diagnosis:     Diagnosis Orders   1. Right shoulder pain, unspecified chronicity        2. Right knee pain, unspecified chronicity          Treatment Diagnosis:    Insurance/Certification information:  [facility]  Referring Physician:  Chen Law MD  Plan of care signed (Y/N):    Visit# / total visits:    Pain level: 0/10 R knee , 0/10 R shoulder   Time In: 1003  Time Out:  1056    Subjective: pt reports that the prednisone has worn off and she is having some more pain. She reported shoulder pain occurs with dressing activities, and when lifting something out of kitchen cabinet. She reports knee pain is intermittent when performing stairs. Exercises:  Exercise/Equipment Resistance/Repetitions Other comments     Bike with arms X 10 mins       Hamstring stretch with PFB 20 sec x 3 reps       Quad stretch with PFB 20 sec x 3 reps       Quad sets 5 sec x 10 reps  10/3 HEP     SAQ 1.5 # 3 sec 2 x 10 reps  10/3 HEP     SLR 1.5 #  x 15 reps  10/3 HEP     Sit to stand X 10 from chair                    Corner stretch   20 sec x 3 reps        pulleys 5 mins       Shoulder IR/ER RTB x 10 reps each 10/3 HEP     rows RTB 3 sec x 10 reps  10/3 HEP                                           Other Therapeutic Activities: tolerated program , did report R knee pain w/ bike activity only 3/10 , shoulder 0/10. Home Exercise Program:  22 - hamstring and quad stretches with strap, corner stretch 10/3 HEP provided and reviewed with pt.  Pt demonstrated good technique to therapist.     Manual Treatments:  N/A    Modalities:  N/A     Time-in Time-out Total Time   39249  Evaluation Low Complexity      96358  Evaluation Med Complexity      08854  Evaluation High Complexity      99289  Ther Ex 1003 2139 85   18552  Neuro Re-ed        94228  Ther Activities        71885  Manual Therapy       79349  E-stim       14570  Ultrasound            Session 1029 [id number]       Treatment/Activity Tolerance:  [x] Patient tolerated treatment well [] Patient limited by fatigue  [] Patient limited by pain  [] Patient limited by other medical complications  [] Other: decreased some exercises due to increased pain reported today    Prognosis: [x] Good [] Fair  [] Poor    Patient Requires Follow-up: [x] Yes  [] No    Plan:   [x] Continue per plan of care [] Alter current plan (see comments)  [] Plan of care initiated [] Hold pending MD visit [] Discharge  Plan for Next Session:        Electronically signed by:  Gm Haywood PTA [id number]

## 2022-10-05 ENCOUNTER — TREATMENT (OUTPATIENT)
Dept: PHYSICAL THERAPY | Age: 62
End: 2022-10-05
Payer: COMMERCIAL

## 2022-10-05 DIAGNOSIS — M25.511 RIGHT SHOULDER PAIN, UNSPECIFIED CHRONICITY: Primary | ICD-10-CM

## 2022-10-05 DIAGNOSIS — M25.561 RIGHT KNEE PAIN, UNSPECIFIED CHRONICITY: ICD-10-CM

## 2022-10-05 PROCEDURE — 97110 THERAPEUTIC EXERCISES: CPT

## 2022-10-05 NOTE — PROGRESS NOTES
[facility]          Phone: [phone] Fax: [phone]    Physical Therapy Daily Treatment Note  Date:  10/5/2022    Patient Name:  Jesus Delcid    :  1960  MRN: [id number]    Evaluating Therapist:  Marlene Corrigan 372    Restrictions/Precautions:    Diagnosis:     Diagnosis Orders   1. Right shoulder pain, unspecified chronicity        2. Right knee pain, unspecified chronicity          Treatment Diagnosis:    Insurance/Certification information:  [facility]  Referring Physician:  Bhargavi Summers MD  Plan of care signed (Y/N):    Visit# / total visits:    Pain level: 3/10 R knee , 310 R shoulder   Time In: 1000  Time Out:  1055    Subjective:  She reported shoulder pain occurs with dressing activities, and when lifting something out of kitchen cabinet to be 3/10. She reports knee pain is intermittent when performing stairs or getting out of the car 3/10    Pt does not feel therapy has made a significant difference in her symptoms. She would rita to go to her f/u with Dr. Idris Alexander and see what he feels is best going forward. Exercises:  Exercise/Equipment Resistance/Repetitions Other comments     Bike with arms X 10 mins       Hamstring stretch with PFB 20 sec x 3 reps       Quad stretch with PFB 20 sec x 3 reps       Quad sets 5 sec x 10 reps  10/3 HEP     SAQ 1.5 # 3 sec 2 x 10 reps  10/3 HEP     SLR 1.5 #  x 15 reps  10/3 HEP     Sit to stand X 10 from chair                    Corner stretch   20 sec x 3 reps        pulleys 5 mins       Shoulder IR/ER RTB x 10 reps each 10/3 HEP     rows RTB 3 sec x 10 reps  10/3 HEP               Quick dash  45.5% R sh flex 4+/5, ABD 4-/5, IR/ER  4-/5      LEFS   41/80 R hip flex Jeaneth Plaster 4/5 throughout                    Other Therapeutic Activities: tolerated program , did no R knee pain w/ bike activity.   Pt did not rate pain after session    Home Exercise Program:  22 - hamstring and quad stretches with strap, corner stretch 10/3 HEP provided and reviewed with pt.  Pt demonstrated good technique to therapist.     Manual Treatments:  N/A    Modalities:  N/A     Time-in Time-out Total Time   97437  Evaluation Low Complexity      48172  Evaluation Med Complexity      75536  Evaluation High Complexity      57443  Ther Ex 1000 1055 55   71229  Neuro Re-ed        57688  Ther Activities        47373  Manual Therapy       08208  E-stim       50354  Ultrasound            Session 5957 [id number]       Treatment/Activity Tolerance:  [x] Patient tolerated treatment well [] Patient limited by fatigue  [] Patient limited by pain  [] Patient limited by other medical complications  [] Other: decreased some exercises due to increased pain reported today    Prognosis: [x] Good [] Fair  [] Poor    Patient Requires Follow-up: [] Yes  [] No    Plan:   [] Continue per plan of care [] Alter current plan (see comments)  [] Plan of care initiated [x] Hold pending MD visit [] Discharge  Plan for Next Session:        Electronically signed by:  Ruperto Momin PTA [id number]

## 2022-10-12 ENCOUNTER — TREATMENT (OUTPATIENT)
Dept: PHYSICAL THERAPY | Age: 62
End: 2022-10-12

## 2022-10-12 ENCOUNTER — HOSPITAL ENCOUNTER (OUTPATIENT)
Age: 62
Discharge: HOME | End: 2022-10-12
Payer: COMMERCIAL

## 2022-10-12 DIAGNOSIS — M25.561 RIGHT KNEE PAIN, UNSPECIFIED CHRONICITY: ICD-10-CM

## 2022-10-12 DIAGNOSIS — M25.511 RIGHT SHOULDER PAIN, UNSPECIFIED CHRONICITY: Primary | ICD-10-CM

## 2022-10-12 LAB
BASOPHILS ABSOLUTE: 0.05 E9/L (ref 0–0.2)
BASOPHILS RELATIVE PERCENT: 0.6 % (ref 0–2)
EOSINOPHILS ABSOLUTE: 0.24 E9/L (ref 0.05–0.5)
EOSINOPHILS RELATIVE PERCENT: 3.1 % (ref 0–6)
HCT VFR BLD CALC: 42.2 % (ref 34–48)
HEMOGLOBIN: 13.9 G/DL (ref 11.5–15.5)
IMMATURE GRANULOCYTES #: 0.03 E9/L
IMMATURE GRANULOCYTES %: 0.4 % (ref 0–5)
LYMPHOCYTES ABSOLUTE: 1.61 E9/L (ref 1.5–4)
LYMPHOCYTES RELATIVE PERCENT: 20.8 % (ref 20–42)
MCH RBC QN AUTO: 30.2 PG (ref 26–35)
MCHC RBC AUTO-ENTMCNC: 32.9 % (ref 32–34.5)
MCV RBC AUTO: 91.7 FL (ref 80–99.9)
MONOCYTES ABSOLUTE: 0.59 E9/L (ref 0.1–0.95)
MONOCYTES RELATIVE PERCENT: 7.6 % (ref 2–12)
NEUTROPHILS ABSOLUTE: 5.22 E9/L (ref 1.8–7.3)
NEUTROPHILS RELATIVE PERCENT: 67.5 % (ref 43–80)
PDW BLD-RTO: 12.4 FL (ref 11.5–15)
PLATELET # BLD: 238 E9/L (ref 130–450)
PMV BLD AUTO: 9.3 FL (ref 7–12)
RBC # BLD: 4.6 E12/L (ref 3.5–5.5)
WBC # BLD: 7.7 E9/L (ref 4.5–11.5)

## 2022-10-12 PROCEDURE — 85025 COMPLETE CBC W/AUTO DIFF WBC: CPT

## 2022-10-12 PROCEDURE — 36415 COLL VENOUS BLD VENIPUNCTURE: CPT

## 2022-10-20 ENCOUNTER — OFFICE VISIT (OUTPATIENT)
Dept: ORTHOPEDIC SURGERY | Age: 62
End: 2022-10-20
Payer: COMMERCIAL

## 2022-10-20 VITALS — BODY MASS INDEX: 26.03 KG/M2 | HEIGHT: 66 IN | WEIGHT: 162 LBS

## 2022-10-20 DIAGNOSIS — M25.511 ACUTE PAIN OF RIGHT SHOULDER: ICD-10-CM

## 2022-10-20 DIAGNOSIS — M25.561 ACUTE PAIN OF RIGHT KNEE: Primary | ICD-10-CM

## 2022-10-20 PROCEDURE — 99204 OFFICE O/P NEW MOD 45 MIN: CPT | Performed by: STUDENT IN AN ORGANIZED HEALTH CARE EDUCATION/TRAINING PROGRAM

## 2022-10-20 NOTE — PROGRESS NOTES
New Knee Patient     Referring Provider:   Lindsay Mondragon MD  [address] [facility], [address] [address],[address]  [facility]    CHIEF COMPLAINT:   Chief Complaint   Patient presents with    Knee Pain     Right knee pain for a little over a year, pain is progressing. Stair climbing is difficult. Has a feeling of looseness. Shoulder Pain     Right shoulder pain for a little over a year. Has difficulty reaching back, will have a sharp pain. HPI:    Dinorah Reynolds is a 58y.o. year old female with right knee and shoulder pain. He has been treated conservatively for over a year for both of these problems by her family physician. She has had physical therapy on both her knee and her shoulder. She is also taken oral steroids and other anti-inflammatories without relief. She endorses mechanical symptoms in her knee with catching and giving way. Her shoulder pain is worse with overhead and she has weakness reaching behind her back. She denies numbness and tingling. She denies fevers and chills. She this continues to progress and not get any better with conservative treatment.       Past Medical History:   Diagnosis Date    Abnormal Pap smear of cervix 2011 apx    Arthritis     cervical    Chronic back pain     Depression     GALEANA (dyspnea on exertion)     Gastritis     GERD (gastroesophageal reflux disease)     Hiatal hernia 3/11/2015    Osteoarthritis     Palpitations     PONV (postoperative nausea and vomiting)     Shingles 2007    Syncope 2012    TMJ (dislocation of temporomandibular joint)        PAST SURGICAL HISTORY  Past Surgical History:   Procedure Laterality Date    APPENDECTOMY  1970    CERVICAL POLYP REMOVAL      CHOLECYSTECTOMY, LAPAROSCOPIC  2003    COLONOSCOPY  3/11/15    Dr. Angelic De Jesus  2011    ENDOSCOPY, COLON, DIAGNOSTIC  2003, 2015 2015 with colonoscopy dr. Lexy Deshpande    HYSTEROSCOPY  09/08/2016    and D&C LAPAROSCOPY      ovarian cysts    LASIK      LUMBAR DISC SURGERY      L1-2 bulging disc.       TONSILLECTOMY AND ADENOIDECTOMY  1985    WISDOM TOOTH EXTRACTION  1983    x 4         FAMILY HISTORY   Family History   Problem Relation Age of Onset    Arthritis Mother     Heart Disease Mother     High Blood Pressure Mother     High Cholesterol Mother     Kidney Disease Mother     Stroke Mother     Arthritis Father     Heart Disease Father     Asthma Brother     Arthritis Maternal Aunt     High Blood Pressure Maternal Aunt     High Cholesterol Maternal Aunt     Stroke Maternal Aunt     Asthma Maternal Uncle     Birth Defects Maternal Uncle     Cancer Maternal Uncle     Heart Disease Maternal Uncle     High Blood Pressure Maternal Uncle     High Cholesterol Maternal Uncle     Stroke Maternal Uncle     Arthritis Paternal Aunt     Birth Defects Paternal Aunt     Cancer Paternal Aunt     Birth Defects Paternal Uncle     Cancer Paternal Uncle     Arthritis Maternal Grandmother     Kidney Disease Maternal Grandmother     Heart Disease Maternal Grandfather     High Blood Pressure Maternal Grandfather     Arthritis Paternal Grandmother     Heart Disease Paternal Grandmother     Stroke Paternal Grandmother     Cancer Paternal Cousin        SOCIAL HISTORY  Social History     Socioeconomic History    Marital status:      Spouse name: Not on file    Number of children: Not on file    Years of education: Not on file    Highest education level: Not on file   Occupational History    Not on file   Tobacco Use    Smoking status: Former     Types: Cigarettes     Quit date: 1981     Years since quittin.1    Smokeless tobacco: Never    Tobacco comments:     only smoked x 1 year in college   Substance and Sexual Activity    Alcohol use: No     Alcohol/week: 0.0 standard drinks     Comment: rarely    Drug use: No    Sexual activity: Yes     Partners: Male     Birth control/protection: Post-menopausal   Other Topics Concern    Not on file   Social History Narrative    Not on file     Social Determinants of Health     Financial Resource Strain: Not on file   Food Insecurity: Not on file   Transportation Needs: Not on file   Physical Activity: Not on file   Stress: Not on file   Social Connections: Not on file   Intimate Partner Violence: Not on file   Housing Stability: Not on file     Social History     Occupational History    Not on file   Tobacco Use    Smoking status: Former     Types: Cigarettes     Quit date: 1981     Years since quittin.1    Smokeless tobacco: Never    Tobacco comments:     only smoked x 1 year in college   Substance and Sexual Activity    Alcohol use: No     Alcohol/week: 0.0 standard drinks     Comment: rarely    Drug use: No    Sexual activity: Yes     Partners: Male     Birth control/protection: Post-menopausal       CURRENT MEDICATIONS     Current Outpatient Medications:     Levocetirizine Dihydrochloride (XYZAL PO), Take by mouth, Disp: , Rfl:     magnesium 200 MG TABS tablet, Take 200 mg by mouth daily Last dose 16, Disp: , Rfl:     b complex vitamins capsule, Take 1 capsule by mouth daily Last dose 16, Disp: , Rfl:     Triamcinolone Acetonide (NASACORT ALLERGY 24HR NA), 1 Inhaler by Nasal route Indications: prn , Disp: , Rfl:     pseudoephedrine (SUDAFED) 30 MG tablet, Take 30 mg by mouth every 4 hours as needed for Congestion (prn) , Disp: , Rfl:     Calcium Carb-Cholecalciferol (CALCIUM + D3) 600-200 MG-UNIT TABS, Take by mouth, Disp: , Rfl:     Calcium Carbonate Antacid (MAALOX PO), Take  by mouth as needed (NIGHTLY AND PRN). , Disp: , Rfl:     therapeutic multivitamin-minerals (THERAGRAN-M) tablet, Take 1 tablet by mouth daily Last dose 16, Disp: , Rfl:     ALLERGIES  Allergies   Allergen Reactions    Other      Powder in exam gloves    Sucralfate Rash       Controlled Substances Monitoring:          REVIEW OF SYSTEMS:     Constitutional:  Negative for weight loss, fevers, chills, fatigue  Cardiovascular: Negative for chest pain, palpitations  Pulmonary: Negative for shortness of breath, labored breathing, cough  GI: negative for abdominal pain, nausea, vomitting   MSK: per HPI  Skin: negative for rash, open wounds    All other systems reviewed and are negative         PHYSICAL EXAM     Vitals:    10/20/22 1138   Weight: 162 lb (73.5 kg)   Height: 5' 6\" (1.676 m)       Height: 5' 6\" (1.676 m)  Weight: [unfilled]  BMI:  Body mass index is 26.15 kg/m². General: The patient is alert and oriented x 3, appears to be stated age and in no distress. HEENT: head is normocephalic, atraumatic. EOMI. Neck: supple, trachea midline, no thyromegaly   Cardiovascular: peripheral pulses palpable. Normal Capillary refill   Respiratory: breathing unlabored, chest expansion symmetric   Skin: no rash, no open wounds, no erythema  Psych: normal affect; mood stable  Neurologic: gait normal, sensation grossly intact in extremities  MSK:        Lower Extremity:   Ipsilateral hip exam shows normal range of motion without pain with impingement testing. Right shoulder: Atraumatic. She has near full range of motion in forward flexion abduction and external rotation. She has limited internal rotation to L4. She has some weakness with resisted external and internal rotation. She has 4-5 strength with resisted shoulder abduction. She has positive impingement sign. She is tender palpation over AC joint. Her elbow range of motion is full and unrestricted without any pain    Right knee: She is tender palpation of her medial joint line. She has no effusion. She is positive Citlali's. The knee stable varus valgus stress testing at 0 and 30 degrees. Negative Lachman. Negative posterior drawer. She is full range of motion from 0 to 130 degrees of flexion.   Calf soft compressible           IMAGING:    XR: Right knee from outside facility demonstrates mild medial joint space narrowing however no signs of end-stage osteoarthritis    X-rays of right shoulder reviewed demonstrate AC joint arthritis well-maintained glenohumeral joint space without any evidence of fracture dislocation        ASSESSMENT  Right knee pain, rule out meniscus tear  Right shoulder rotator cuff tendinitis is failed conservative treatment, rule out full-thickness tear    PLAN  -Discussed in detail with the patient. I did offer her my home exercise program along with injections however she is tired of conservative treatment and has never had no relief over the past year. For this reason we are going to order some advanced imaging and get MRIs of both her right shoulder and right knee. As soon as she gets her test results she can come back in and we can talk about the imaging and come up with a treatment plan.   She wishes to proceed with this treatment all questions were answered in detail      Aurelia Hall DO  Orthopaedic Surgery   10/20/22   11:29 AM MARIT

## 2022-11-08 ENCOUNTER — HOSPITAL ENCOUNTER (OUTPATIENT)
Dept: MRI IMAGING | Age: 62
Discharge: HOME | End: 2022-11-10
Payer: COMMERCIAL

## 2022-11-08 DIAGNOSIS — M25.511 ACUTE PAIN OF RIGHT SHOULDER: ICD-10-CM

## 2022-11-08 DIAGNOSIS — M25.561 ACUTE PAIN OF RIGHT KNEE: ICD-10-CM

## 2022-11-08 PROCEDURE — 73221 MRI JOINT UPR EXTREM W/O DYE: CPT

## 2022-11-08 PROCEDURE — 73721 MRI JNT OF LWR EXTRE W/O DYE: CPT

## 2022-11-17 ENCOUNTER — OFFICE VISIT (OUTPATIENT)
Dept: ORTHOPEDIC SURGERY | Age: 62
End: 2022-11-17
Payer: COMMERCIAL

## 2022-11-17 VITALS — BODY MASS INDEX: 26.03 KG/M2 | WEIGHT: 162 LBS | HEIGHT: 66 IN

## 2022-11-17 DIAGNOSIS — M25.511 ACUTE PAIN OF RIGHT SHOULDER: ICD-10-CM

## 2022-11-17 DIAGNOSIS — M25.561 ACUTE PAIN OF RIGHT KNEE: ICD-10-CM

## 2022-11-17 DIAGNOSIS — M75.111 NONTRAUMATIC INCOMPLETE TEAR OF RIGHT ROTATOR CUFF: Primary | ICD-10-CM

## 2022-11-17 PROCEDURE — 20610 DRAIN/INJ JOINT/BURSA W/O US: CPT | Performed by: STUDENT IN AN ORGANIZED HEALTH CARE EDUCATION/TRAINING PROGRAM

## 2022-11-17 PROCEDURE — 99214 OFFICE O/P EST MOD 30 MIN: CPT | Performed by: STUDENT IN AN ORGANIZED HEALTH CARE EDUCATION/TRAINING PROGRAM

## 2022-11-17 RX ORDER — TRIAMCINOLONE ACETONIDE 40 MG/ML
40 INJECTION, SUSPENSION INTRA-ARTICULAR; INTRAMUSCULAR ONCE
Status: COMPLETED | OUTPATIENT
Start: 2022-11-17 | End: 2022-11-17

## 2022-11-17 RX ORDER — MAGNESIUM HYDROXIDE/ALUMINUM HYDROXICE/SIMETHICONE 120; 1200; 1200 MG/30ML; MG/30ML; MG/30ML
SUSPENSION ORAL
COMMUNITY

## 2022-11-17 RX ORDER — BUPIVACAINE HYDROCHLORIDE 2.5 MG/ML
2 INJECTION, SOLUTION INFILTRATION; PERINEURAL ONCE
Status: COMPLETED | OUTPATIENT
Start: 2022-11-17 | End: 2022-11-17

## 2022-11-17 RX ADMIN — TRIAMCINOLONE ACETONIDE 40 MG: 40 INJECTION, SUSPENSION INTRA-ARTICULAR; INTRAMUSCULAR at 11:56

## 2022-11-17 RX ADMIN — BUPIVACAINE HYDROCHLORIDE 5 MG: 2.5 INJECTION, SOLUTION INFILTRATION; PERINEURAL at 11:54

## 2022-11-17 NOTE — PATIENT INSTRUCTIONS
Rotator Cuff: Exercises  Introduction  Here are some examples of exercises for you to try. The exercises may be suggested for a condition or for rehabilitation. Start each exercise slowly. Ease off the exercises if you start to have pain. You will be told when to start these exercises and which ones will work bestfor you. How to do the exercises  Pendulum swing  If you have pain in your back, do not do this exercise. Hold on to a table or the back of a chair with your good arm. Then bend forward a little and let your sore arm hang straight down. This exercise does not use the arm muscles. Rather, use your legs and your hips to create movement that makes your arm swing freely. Use the movement from your hips and legs to guide the slightly swinging arm back and forth like a pendulum (or elephant trunk). Then guide it in circles that start small (about the size of a dinner plate). Make the circles a bit larger each day, as your pain allows. Do this exercise for 5 minutes, 5 to 7 times each day. As you have less pain, try bending over a little farther to do this exercise. This will increase the amount of movement at your shoulder. Posterior stretching exercise  Hold the elbow of your injured arm with your other hand. Use your hand to pull your injured arm gently up and across your body. You will feel a gentle stretch across the back of your injured shoulder. Hold for at least 15 to 30 seconds. Then slowly lower your arm. Repeat 2 to 4 times. Up-the-back stretch  Your doctor or physical therapist may want you to wait to do this stretch until you have regained most of your range of motion and strength. You can do this stretch in different ways. Hold any of these stretches for at least 15 to 30seconds. Repeat them 2 to 4 times. Light stretch: Put your hand in your back pocket. Let it rest there to stretch your shoulder. Moderate stretch:  With your other hand, hold your injured arm (palm outward) behind your back by the wrist. Pull your arm up gently to stretch your shoulder. Advanced stretch: Put a towel over your other shoulder. Put the hand of your injured arm behind your back. Now hold the back end of the towel. With the other hand, hold the front end of the towel in front of your body. Pull gently on the front end of the towel. This will bring your hand farther up your back to stretch your shoulder. Overhead stretch  Standing about an arm's length away, grasp onto a solid surface. You could use a countertop, a doorknob, or the back of a sturdy chair. With your knees slightly bent, bend forward with your arms straight. Lower your upper body, and let your shoulders stretch. As your shoulders are able to stretch farther, you may need to take a step or two backward. Hold for at least 15 to 30 seconds. Then stand up and relax. If you had stepped back during your stretch, step forward so you can keep your hands on the solid surface. Repeat 2 to 4 times. Shoulder flexion (lying down)  To make a wand for this exercise, use a piece of PVC pipe or a broom handlewith the broom removed. Make the wand about a foot wider than your shoulders. Lie on your back, holding a wand with both hands. Your palms should face down as you hold the wand. Keeping your elbows straight, slowly raise your arms over your head. Raise them until you feel a stretch in your shoulders, upper back, and chest.  Hold for 15 to 30 seconds. Repeat 2 to 4 times. Shoulder rotation (lying down)  To make a wand for this exercise, use a piece of PVC pipe or a broom handlewith the broom removed. Make the wand about a foot wider than your shoulders. Lie on your back. Hold a wand with both hands with your elbows bent and palms up. Keep your elbows close to your body, and move the wand across your body toward the sore arm. Hold for 8 to 12 seconds. Repeat 2 to 4 times.   Wall climbing (to the side)  Avoid any movement that is straight to your side, you need to, bring your \"good\" arm across your body and place it under the elbow as you lower your injured arm. Use your good arm to keep your injured arm from dropping down too fast.  Repeat 8 to 12 times. When you first start out, don't hold any extra weight in your hand. As you get stronger, you may use a 1-pound to 2-pound dumbbell or a small can of food. Shoulder flexor and extensor exercise  These are isometric exercises. That means you contract your muscles withoutactually moving. Push forward (flex): Stand facing a wall or doorjamb, about 6 inches or less back. Hold your injured arm against your body. Make a closed fist with your thumb on top. Then gently push your hand forward into the wall with about 25% to 50% of your strength. Don't let your body move backward as you push. Hold for about 6 seconds. Relax for a few seconds. Repeat 8 to 12 times. Push backward (extend): Stand with your back flat against a wall. Your upper arm should be against the wall, with your elbow bent 90 degrees (your hand straight ahead). Push your elbow gently back against the wall with about 25% to 50% of your strength. Don't let your body move forward as you push. Hold for about 6 seconds. Relax for a few seconds. Repeat 8 to 12 times. Scapular exercise: Wall push-ups  This exercise is best done with your fingers somewhat turned out, rather thanstraight up and down. Stand facing a wall, about 12 inches to 18 inches away. Place your hands on the wall at shoulder height. Slowly bend your elbows and bring your face to the wall. Keep your back and hips straight. Push back to where you started. Repeat 8 to 12 times. When you can do this exercise against a wall comfortably, you can try it against a counter. You can then slowly progress to the end of a couch, then to a sturdy chair, and finally to the floor.   Scapular exercise: Retraction  For this exercise, you will need elastic exercise material, such as surgicaltubing or Thera-Band. Put the band around a solid object at about waist level. (A bedpost will work well.) Each hand should hold an end of the band. With your elbows at your sides and bent to 90 degrees, pull the band back. Your shoulder blades should move toward each other. Then move your arms back where you started. Repeat 8 to 12 times. If you have good range of motion in your shoulders, try this exercise with your arms lifted out to the sides. Keep your elbows at a 90-degree angle. Raise the elastic band up to about shoulder level. Pull the band back to move your shoulder blades toward each other. Then move your arms back where you started. Internal rotator strengthening exercise  Start by tying a piece of elastic exercise material to a doorknob. You can use surgical tubing or Thera-Band. Stand or sit with your shoulder relaxed and your elbow bent 90 degrees. Your upper arm should rest comfortably against your side. Squeeze a rolled towel between your elbow and your body for comfort. This will help keep your arm at your side. Hold one end of the elastic band in the hand of the painful arm. Slowly rotate your forearm toward your body until it touches your belly. Slowly move it back to where you started. Keep your elbow and upper arm firmly tucked against the towel roll or at your side. Repeat 8 to 12 times. External rotator strengthening exercise  Start by tying a piece of elastic exercise material to a doorknob. You can use surgical tubing or Thera-Band. (You may also hold one end of the band in each hand.)  Stand or sit with your shoulder relaxed and your elbow bent 90 degrees. Your upper arm should rest comfortably against your side. Squeeze a rolled towel between your elbow and your body for comfort. This will help keep your arm at your side. Hold one end of the elastic band with the hand of the painful arm. Start with your forearm across your belly. Slowly rotate the forearm out away from your body.  Keep your elbow and upper arm tucked against the towel roll or the side of your body until you begin to feel tightness in your shoulder. Slowly move your arm back to where you started. Repeat 8 to 12 times. Follow-up care is a key part of your treatment and safety. Be sure to make and go to all appointments, and call your doctor if you are having problems. It's also a good idea to know your test results and keep alist of the medicines you take. Where can you learn more? Go to https://[employer]. org and sign in to your [employer] account. Enter Jennifer Howell in the [employer] box to learn more about \"Rotator Cuff: Exercises. \"     If you do not have an account, please click on the \"Sign Up Now\" link. Current as of: March 9, 2022               Content Version: 13.3  © 0061-3294 [employer], Incorporated. Care instructions adapted under license by [facility] [place] ([facility]). If you have questions about a medical condition or this instruction, always ask your healthcare professional. [address] any warranty or liability for your use of this information.

## 2022-11-17 NOTE — PROGRESS NOTES
Follow Up Visit     Albert Polanco returns today for follow up visit regarding her right shoulder and right knee. We will get an MRI as prior to this visit. She is here to talk about the results. She did move picnic table recently and her shoulders been bothering her more more. Physical Exam:     Height: 5' 6\" (1.676 m), Weight: 162 lb (73.5 kg)    General: Alert and oriented x3, no acute distress  Cardiovascular/pulmonary: No labored breathing, peripheral perfusion intact  Musculoskeletal:    Right shoulder: Atraumatic. She has near full range of motion in forward flexion abduction and external rotation. She has limited internal rotation to L4. She has some weakness with resisted external and internal rotation. She has 4-5 strength with resisted shoulder abduction. She has positive impingement sign. She is tender palpation over AC joint. Her elbow range of motion is full and unrestricted without any pain     Right knee: She is tender palpation of her medial joint line. She has no effusion. She is positive Citlali's. The knee stable varus valgus stress testing at 0 and 30 degrees. Negative Lachman. Negative posterior drawer. She is full range of motion from 0 to 130 degrees of flexion. Calf soft compressible    Controlled Substances Monitoring:      Imaging:  MRIs were reviewed with myself and the patient and discussed in detail. Her right shoulder MRI shows AC joint arthritis with evidence of subacromial bursitis and impingement with partial-thickness bursal sided rotator cuff tearing. Her knee MRI is largely negative besides mild medial and patellofemoral joint arthritis      Assessment:   Right shoulder partial-thickness rotator cuff tearing with subacromial bursitis and AC joint arthritis  Right knee mild medial and patellofemoral joint arthritis      Plan:   -Treatment discussed in detail with the patient.   I have recommended an injection into her right shoulder to see if this helps relieve her symptoms. We will get some diagnostic information out of this as well. If this fails to provide lasting relief we did have a long discussion about surgical management including surgical arthroscopy with examination of the rotator cuff with debridement versus repair and AC joint resection with subacromial decompression. We will see her back in 1 month and see how she is doing. If she is no better we will talk about scheduling surgery. As far as her knee goes at this starts to flareup we can recommend an injection as well. She is happy with this treatment plan and wishes to proceed. Procedure Note:  Right Shoulder steroid injection     The Right shoulder was identified as the injection site. The risk and benefits of a cortisone injection were explained and the patient consented to the injection. Under sterile conditions, the subacromial space was injected from posterior approach with a mixture of 40 mg of Kenalog, 2 cc  0.25% Marcaine without complication. A sterile bandage was applied.          Dina Hester DO  Orthopaedic Surgery   11/17/22   10:42 AM EST

## 2022-12-03 ENCOUNTER — APPOINTMENT (OUTPATIENT)
Dept: CT IMAGING | Age: 62
DRG: 176 | End: 2022-12-03
Payer: COMMERCIAL

## 2022-12-03 ENCOUNTER — HOSPITAL ENCOUNTER (INPATIENT)
Age: 62
LOS: 4 days | Discharge: HOME | DRG: 176 | End: 2022-12-07
Attending: EMERGENCY MEDICINE | Admitting: INTERNAL MEDICINE
Payer: COMMERCIAL

## 2022-12-03 ENCOUNTER — NURSE TRIAGE (OUTPATIENT)
Dept: OTHER | Facility: CLINIC | Age: 62
End: 2022-12-03

## 2022-12-03 ENCOUNTER — APPOINTMENT (OUTPATIENT)
Dept: GENERAL RADIOLOGY | Age: 62
DRG: 176 | End: 2022-12-03
Payer: COMMERCIAL

## 2022-12-03 DIAGNOSIS — K86.89 PANCREATIC MASS: ICD-10-CM

## 2022-12-03 DIAGNOSIS — I26.92 ACUTE SADDLE PULMONARY EMBOLISM, UNSPECIFIED WHETHER ACUTE COR PULMONALE PRESENT (HCC): Primary | ICD-10-CM

## 2022-12-03 LAB
ALBUMIN SERPL-MCNC: 4 G/DL (ref 3.5–5.2)
ALP BLD-CCNC: 95 U/L (ref 35–104)
ALT SERPL-CCNC: 20 U/L (ref 0–32)
ANION GAP SERPL CALCULATED.3IONS-SCNC: 12 MMOL/L (ref 7–16)
APTT: 30.7 SEC (ref 24.5–35.1)
AST SERPL-CCNC: 27 U/L (ref 0–31)
BASOPHILS ABSOLUTE: 0.04 E9/L (ref 0–0.2)
BASOPHILS RELATIVE PERCENT: 0.4 % (ref 0–2)
BILIRUB SERPL-MCNC: 0.5 MG/DL (ref 0–1.2)
BILIRUBIN URINE: NEGATIVE
BLOOD, URINE: NEGATIVE
BUN BLDV-MCNC: 18 MG/DL (ref 6–23)
CALCIUM SERPL-MCNC: 9.4 MG/DL (ref 8.6–10.2)
CHLORIDE BLD-SCNC: 106 MMOL/L (ref 98–107)
CLARITY: CLEAR
CO2: 25 MMOL/L (ref 22–29)
COLOR: YELLOW
CREAT SERPL-MCNC: 1 MG/DL (ref 0.5–1)
EOSINOPHILS ABSOLUTE: 0.26 E9/L (ref 0.05–0.5)
EOSINOPHILS RELATIVE PERCENT: 2.3 % (ref 0–6)
GFR SERPL CREATININE-BSD FRML MDRD: >60 ML/MIN/1.73
GLUCOSE BLD-MCNC: 99 MG/DL (ref 74–99)
GLUCOSE URINE: NEGATIVE MG/DL
HCT VFR BLD CALC: 47.1 % (ref 34–48)
HEMOGLOBIN: 15.8 G/DL (ref 11.5–15.5)
IMMATURE GRANULOCYTES #: 0.04 E9/L
IMMATURE GRANULOCYTES %: 0.4 % (ref 0–5)
INFLUENZA A: NOT DETECTED
INFLUENZA B: NOT DETECTED
KETONES, URINE: NEGATIVE MG/DL
LEUKOCYTE ESTERASE, URINE: NEGATIVE
LYMPHOCYTES ABSOLUTE: 1.63 E9/L (ref 1.5–4)
LYMPHOCYTES RELATIVE PERCENT: 14.6 % (ref 20–42)
MAGNESIUM: 2.4 MG/DL (ref 1.6–2.6)
MCH RBC QN AUTO: 30.3 PG (ref 26–35)
MCHC RBC AUTO-ENTMCNC: 33.5 % (ref 32–34.5)
MCV RBC AUTO: 90.2 FL (ref 80–99.9)
MONOCYTES ABSOLUTE: 0.64 E9/L (ref 0.1–0.95)
MONOCYTES RELATIVE PERCENT: 5.7 % (ref 2–12)
NEUTROPHILS ABSOLUTE: 8.55 E9/L (ref 1.8–7.3)
NEUTROPHILS RELATIVE PERCENT: 76.6 % (ref 43–80)
NITRITE, URINE: NEGATIVE
PDW BLD-RTO: 12.5 FL (ref 11.5–15)
PH UA: 6.5 (ref 5–9)
PLATELET # BLD: 187 E9/L (ref 130–450)
PMV BLD AUTO: 9.2 FL (ref 7–12)
POTASSIUM SERPL-SCNC: 4.5 MMOL/L (ref 3.5–5)
PRO-BNP: 594 PG/ML (ref 0–125)
PROTEIN UA: NEGATIVE MG/DL
RBC # BLD: 5.22 E12/L (ref 3.5–5.5)
SARS-COV-2 RNA, RT PCR: NOT DETECTED
SODIUM BLD-SCNC: 143 MMOL/L (ref 132–146)
SPECIFIC GRAVITY UA: <=1.005 (ref 1–1.03)
TOTAL PROTEIN: 7.3 G/DL (ref 6.4–8.3)
TROPONIN, HIGH SENSITIVITY: 66 NG/L (ref 0–9)
TROPONIN, HIGH SENSITIVITY: 79 NG/L (ref 0–9)
UROBILINOGEN, URINE: 0.2 E.U./DL
WBC # BLD: 11.2 E9/L (ref 4.5–11.5)

## 2022-12-03 PROCEDURE — 71275 CT ANGIOGRAPHY CHEST: CPT

## 2022-12-03 PROCEDURE — 96361 HYDRATE IV INFUSION ADD-ON: CPT

## 2022-12-03 PROCEDURE — 36415 COLL VENOUS BLD VENIPUNCTURE: CPT

## 2022-12-03 PROCEDURE — 6360000002 HC RX W HCPCS: Performed by: STUDENT IN AN ORGANIZED HEALTH CARE EDUCATION/TRAINING PROGRAM

## 2022-12-03 PROCEDURE — 84443 ASSAY THYROID STIM HORMONE: CPT

## 2022-12-03 PROCEDURE — 2580000003 HC RX 258: Performed by: STUDENT IN AN ORGANIZED HEALTH CARE EDUCATION/TRAINING PROGRAM

## 2022-12-03 PROCEDURE — 81003 URINALYSIS AUTO W/O SCOPE: CPT

## 2022-12-03 PROCEDURE — 6370000000 HC RX 637 (ALT 250 FOR IP): Performed by: STUDENT IN AN ORGANIZED HEALTH CARE EDUCATION/TRAINING PROGRAM

## 2022-12-03 PROCEDURE — 80053 COMPREHEN METABOLIC PANEL: CPT

## 2022-12-03 PROCEDURE — 85730 THROMBOPLASTIN TIME PARTIAL: CPT

## 2022-12-03 PROCEDURE — 99285 EMERGENCY DEPT VISIT HI MDM: CPT

## 2022-12-03 PROCEDURE — 93005 ELECTROCARDIOGRAM TRACING: CPT | Performed by: STUDENT IN AN ORGANIZED HEALTH CARE EDUCATION/TRAINING PROGRAM

## 2022-12-03 PROCEDURE — 96366 THER/PROPH/DIAG IV INF ADDON: CPT

## 2022-12-03 PROCEDURE — 85025 COMPLETE CBC W/AUTO DIFF WBC: CPT

## 2022-12-03 PROCEDURE — 71045 X-RAY EXAM CHEST 1 VIEW: CPT

## 2022-12-03 PROCEDURE — 2000000000 HC ICU R&B

## 2022-12-03 PROCEDURE — 83735 ASSAY OF MAGNESIUM: CPT

## 2022-12-03 PROCEDURE — 84439 ASSAY OF FREE THYROXINE: CPT

## 2022-12-03 PROCEDURE — 6360000004 HC RX CONTRAST MEDICATION: Performed by: RADIOLOGY

## 2022-12-03 PROCEDURE — 87636 SARSCOV2 & INF A&B AMP PRB: CPT

## 2022-12-03 PROCEDURE — 84484 ASSAY OF TROPONIN QUANT: CPT

## 2022-12-03 PROCEDURE — 96365 THER/PROPH/DIAG IV INF INIT: CPT

## 2022-12-03 PROCEDURE — 83880 ASSAY OF NATRIURETIC PEPTIDE: CPT

## 2022-12-03 RX ORDER — HEPARIN SODIUM 1000 [USP'U]/ML
40 INJECTION, SOLUTION INTRAVENOUS; SUBCUTANEOUS PRN
Status: DISCONTINUED | OUTPATIENT
Start: 2022-12-03 | End: 2022-12-07

## 2022-12-03 RX ORDER — SODIUM CHLORIDE 0.9 % (FLUSH) 0.9 %
10 SYRINGE (ML) INJECTION ONCE
Status: DISCONTINUED | OUTPATIENT
Start: 2022-12-03 | End: 2022-12-07 | Stop reason: HOSPADM

## 2022-12-03 RX ORDER — HEPARIN SODIUM 10000 [USP'U]/100ML
5-30 INJECTION, SOLUTION INTRAVENOUS CONTINUOUS
Status: DISCONTINUED | OUTPATIENT
Start: 2022-12-03 | End: 2022-12-06

## 2022-12-03 RX ORDER — HEPARIN SODIUM 1000 [USP'U]/ML
80 INJECTION, SOLUTION INTRAVENOUS; SUBCUTANEOUS ONCE
Status: DISCONTINUED | OUTPATIENT
Start: 2022-12-03 | End: 2022-12-06

## 2022-12-03 RX ORDER — ACETAMINOPHEN 325 MG/1
650 TABLET ORAL ONCE
Status: COMPLETED | OUTPATIENT
Start: 2022-12-03 | End: 2022-12-03

## 2022-12-03 RX ORDER — 0.9 % SODIUM CHLORIDE 0.9 %
1000 INTRAVENOUS SOLUTION INTRAVENOUS ONCE
Status: COMPLETED | OUTPATIENT
Start: 2022-12-03 | End: 2022-12-03

## 2022-12-03 RX ORDER — HEPARIN SODIUM 1000 [USP'U]/ML
80 INJECTION, SOLUTION INTRAVENOUS; SUBCUTANEOUS PRN
Status: DISCONTINUED | OUTPATIENT
Start: 2022-12-03 | End: 2022-12-07

## 2022-12-03 RX ADMIN — IOPAMIDOL 75 ML: 755 INJECTION, SOLUTION INTRAVENOUS at 18:09

## 2022-12-03 RX ADMIN — SODIUM CHLORIDE 1000 ML: 9 INJECTION, SOLUTION INTRAVENOUS at 17:28

## 2022-12-03 RX ADMIN — HEPARIN SODIUM 18 UNITS/KG/HR: 10000 INJECTION, SOLUTION INTRAVENOUS at 19:20

## 2022-12-03 RX ADMIN — ACETAMINOPHEN 650 MG: 325 TABLET ORAL at 19:48

## 2022-12-03 RX ADMIN — HEPARIN SODIUM 5880 UNITS: 1000 INJECTION, SOLUTION INTRAVENOUS; SUBCUTANEOUS at 19:15

## 2022-12-03 ASSESSMENT — ENCOUNTER SYMPTOMS
CHEST TIGHTNESS: 1
COLOR CHANGE: 0
SHORTNESS OF BREATH: 1
NAUSEA: 0
COUGH: 0
VOMITING: 0
DIARRHEA: 0
RHINORRHEA: 0
BACK PAIN: 0
BLOOD IN STOOL: 0
ABDOMINAL PAIN: 0
CONSTIPATION: 0
SORE THROAT: 0
ABDOMINAL DISTENTION: 0
PHOTOPHOBIA: 0
EYE PAIN: 0

## 2022-12-03 ASSESSMENT — PAIN DESCRIPTION - ONSET: ONSET: SUDDEN

## 2022-12-03 ASSESSMENT — PAIN DESCRIPTION - DESCRIPTORS: DESCRIPTORS: TIGHTNESS

## 2022-12-03 ASSESSMENT — PAIN DESCRIPTION - LOCATION: LOCATION: CHEST

## 2022-12-03 ASSESSMENT — PAIN SCALES - GENERAL
PAINLEVEL_OUTOF10: 5
PAINLEVEL_OUTOF10: 5

## 2022-12-03 ASSESSMENT — PAIN - FUNCTIONAL ASSESSMENT: PAIN_FUNCTIONAL_ASSESSMENT: 0-10

## 2022-12-03 ASSESSMENT — PAIN DESCRIPTION - PAIN TYPE: TYPE: ACUTE PAIN

## 2022-12-03 ASSESSMENT — PAIN DESCRIPTION - FREQUENCY: FREQUENCY: CONTINUOUS

## 2022-12-03 NOTE — ED PROVIDER NOTES
Name: Ramiro Farmer    MRN: [id number]     Date / Time Roomed:  12/3/2022  3:33 PM  ED Bed Assignment:  4126/5530-M    ------------------ History of Present Illness --------------------  12/3/22, Time: 3:40 PM EST   Chief Complaint   Patient presents with    Shortness of Breath     Chest tightness and tightness in upper neck. Irregular heart beat and fast heart rate. Started this am.     Chest Pain      HPI    Ramiro Farmer is a 58 y.o. female, with hx of GERD, osteoarthritis, syncopal episode, who presents to the ED today for shortness of breath over past several weeks and tachycardia occurring today. Pt relates she has felt short of breath over past 3-4 weeks, esvin with exertion such as going up stairs. Pt relates today she felt palpitations and felt that her heart was racing and had some associated lightheadedness and mild HA. She states she has some chest tightness however denies any feeling of pain. Pt denies any recent illnesses, fevers, n/v, abd pain, GI or  complaints. Pt denies any leg swelling or pain. Pt denies any numbness, paresthesias or weakness. Pt not on any blood thinners. The pt denies other ROS at this time. PCP: Manolo Ramos MD.    -------------------- PMH --------------------  Past Medical History:  has a past medical history of Abnormal Pap smear of cervix, Arthritis, Chronic back pain, Depression, GALEANA (dyspnea on exertion), Gastritis, GERD (gastroesophageal reflux disease), Hiatal hernia, Osteoarthritis, Palpitations, Partial tear of rotator cuff, PONV (postoperative nausea and vomiting), Shingles, Syncope, and TMJ (dislocation of temporomandibular joint). Past Surgical History:  has a past surgical history that includes Cholecystectomy, laparoscopic (2003); Appendectomy (1970); LASIK (1998); Colonoscopy (03/11/2015); Colonoscopy; Endometrial biopsy (2011); laparoscopy (1989); Endoscopy, colon, diagnostic (2003, 2015); hysteroscopy (09/08/2016);  Cervical polyp removal; Dilation and curettage of uterus; [place] tooth extraction (1983); and Tonsillectomy and adenoidectomy (1985). Social History:  reports that she quit smoking about 41 years ago. Her smoking use included cigarettes. She has never used smokeless tobacco. She reports that she does not drink alcohol and does not use drugs. Family History: family history includes Arthritis in her father, maternal aunt, maternal grandmother, mother, paternal aunt, and paternal grandmother; Asthma in her brother and maternal uncle; Birth Defects in her maternal uncle, paternal aunt, and paternal uncle; Cancer in her maternal uncle, paternal aunt, paternal cousin, and paternal uncle; Heart Disease in her father, maternal grandfather, maternal uncle, mother, and paternal grandmother; High Blood Pressure in her maternal aunt, maternal grandfather, maternal uncle, and mother; High Cholesterol in her maternal aunt, maternal uncle, and mother; Kidney Disease in her maternal grandmother and mother; Stroke in her maternal aunt, maternal uncle, mother, and paternal grandmother. Allergies: Other and Sucralfate    The patients past medical history has been reviewed.     -------------------- Current Meds --------------------  Meds:   Current Facility-Administered Medications:     therapeutic multivitamin-minerals 1 tablet, 1 tablet, Oral, Daily, Katy Mai MD    vitamin B and C (TOTAL B-C) 1 tablet, 1 tablet, Oral, Daily, Gold Castillo MD    sodium chloride flush 0.9 % injection 5-40 mL, 5-40 mL, IntraVENous, 2 times per day, Tommie Morrow MD    sodium chloride flush 0.9 % injection 5-40 mL, 5-40 mL, IntraVENous, PRN, Tommie Morrow MD    0.9 % sodium chloride infusion, , IntraVENous, PRN, Tommie Morrow MD    ondansetron (ZOFRAN-ODT) disintegrating tablet 4 mg, 4 mg, Oral, Q8H PRN **OR** ondansetron (ZOFRAN) injection 4 mg, 4 mg, IntraVENous, Q6H PRN, Tommie Morrow MD    polyethylene glycol (GLYCOLAX) packet 17 g, 17 g, Oral, Daily PRN, Tommie Morrow MD    acetaminophen (TYLENOL) tablet 650 mg, 650 mg, Oral, Q6H PRN **OR** acetaminophen (TYLENOL) suppository 650 mg, 650 mg, Rectal, Q6H PRN, Tommie Morrow MD    perflutren lipid microspheres (DEFINITY) injection 1.5 mL, 1.5 mL, IntraVENous, ONCE PRN, Tommie Morrow MD    perflutren lipid microspheres (DEFINITY) injection 1.5 mL, 1.5 mL, IntraVENous, ONCE PRN, Tommie Morrow MD    sodium chloride flush 0.9 % injection 10 mL, 10 mL, IntraVENous, Once, Tosha Philip MD    heparin (porcine) injection 5,880 Units, 80 Units/kg, IntraVENous, PRN, Jaziel Wilkes, DO, 5,880 Units at 12/03/22 1915    heparin (porcine) injection 2,940 Units, 40 Units/kg, IntraVENous, PRN, Jaziel Wilkes, DO    heparin 25,000 units in dextrose 5% 250 mL (premix) infusion, 5-30 Units/kg/hr, IntraVENous, Continuous, Jaziel Wilkes, DO, Last Rate: 13.2 mL/hr at 12/03/22 1920, 18 Units/kg/hr at 12/03/22 1920    heparin (porcine) injection 5,880 Units, 80 Units/kg, IntraVENous, Once, Jaziel Wilkes, DO     The patients home medications have been reviewed. -------------------- ROS --------------------  Review of Systems   Constitutional:  Negative for chills, fatigue and fever. HENT:  Negative for congestion, rhinorrhea and sore throat. Eyes:  Negative for photophobia, pain and visual disturbance. Respiratory:  Positive for chest tightness and shortness of breath (on exertion). Negative for cough. Cardiovascular:  Positive for palpitations. Negative for chest pain and leg swelling. Gastrointestinal:  Negative for abdominal distention, abdominal pain, blood in stool, constipation, diarrhea, nausea and vomiting. Genitourinary:  Negative for difficulty urinating, dysuria, hematuria, vaginal bleeding and vaginal discharge. Musculoskeletal:  Negative for back pain, neck pain and neck stiffness. Skin:  Negative for color change, rash and wound.    Neurological:  Positive for light-headedness and headaches. Negative for dizziness and syncope. Psychiatric/Behavioral:  Negative for agitation, behavioral problems and confusion.       -------------------- PE --------------------  Physical Exam  Constitutional:       General: She is not in acute distress. Appearance: Normal appearance. She is normal weight. She is not ill-appearing, toxic-appearing or diaphoretic. HENT:      Head: Normocephalic and atraumatic. Right Ear: External ear normal.      Left Ear: External ear normal.      Nose: Nose normal. No rhinorrhea. Mouth/Throat:      Pharynx: Oropharynx is clear. Eyes:      General: No scleral icterus. Right eye: No discharge. Left eye: No discharge. Extraocular Movements: Extraocular movements intact. Conjunctiva/sclera: Conjunctivae normal.      Pupils: Pupils are equal, round, and reactive to light. Cardiovascular:      Rate and Rhythm: Regular rhythm. Tachycardia present. Pulses: Normal pulses. Pulmonary:      Effort: Pulmonary effort is normal. No respiratory distress. Breath sounds: Normal breath sounds. No stridor. Abdominal:      General: Abdomen is flat. There is no distension. Palpations: Abdomen is soft. Tenderness: There is no abdominal tenderness. There is no guarding. Musculoskeletal:         General: No swelling or tenderness. Normal range of motion. Cervical back: Normal range of motion. Right lower leg: No edema. Left lower leg: No edema. Skin:     General: Skin is warm and dry. Capillary Refill: Capillary refill takes less than 2 seconds. Coloration: Skin is not jaundiced. Findings: No erythema or rash. Neurological:      General: No focal deficit present. Mental Status: She is alert and oriented to person, place, and time. Cranial Nerves: No cranial nerve deficit. Motor: No weakness.    Psychiatric:         Mood and Affect: Mood normal.         Behavior: Behavior normal. -------------------- Procedures --------------------  Procedures     -------------------------------  BEDSIDE ULTRASOUND - LIMITED  -------------------------------  Performed and interpreted by the emergency physician Sejal ALLEN PGY-1    Time: 6098  Indication: Evaluation for DVT  Technique: vascular US with linear high frequency probe  Structures/Organs Investigated: bilateral femoral veins  Interpretation: Good compressibility of femoral veins at greater saphenous junction, no evidence of DVT at this space. Post-procedure: Patient tolerated the procedure well with no immediate complications. Sejal ALLEN PGY2    MDM  Number of Diagnoses or Management Options  Acute saddle pulmonary embolism, unspecified whether acute cor pulmonale present [facility])  Pancreatic mass  Diagnosis management comments: 57 y/o F presents today for shortness of breath on exertion over past several weeks as well as tachycardia and palpitations today. Pt alert, oriented, no distress. , other vitals wnl. Lungs C/E. HR tachy, regular. Abd soft, nondistended, nontender. No LE edema or erythema. Skin warm, dry, well perfused. Concern for PE, viral URI, covid, pneumonia, influenza, cardiac etiology. IV fluids were given. CT scan remarkable for bilateral large pulmonary emboli. Spoke with radiologist who stated it was nearly saddle embolus and did have evidence of right heart strain. Pt not currently on blood thinners. Denies any hx of clots. EKG showed sinus tach with some right axis deviation. No ST elevations however. Troponins were elevated 79>66. Mild elevation of BNP. Remainder of labwork was noncontributory. High dose heparin ordered for PE. Radiologist also stated appeared to be possible mass of pancreas, however would need further dedicated imaging to evaluate this. Pt denied any abd pain or tenderness. Denied any GI symptoms.      Pt with no apparent dyspnea at rest. O2 sats normal on room air at this time. BP remained stable. Spoke with pt and  about results and they are amenable to transfer to [facility] for further care. Limited bedside US utilized, no evidence of significant DVT in either LE at the femoral v - greater saphenous location. Transfer was initiated. Spoke with Dr. Kari Mcdonough, intensivist [facility], who did recommend pt be brought to ICU. Spoke with Dr. Balbir Akers, hospitalist, who will admit pt pending transfer to [facility]. EKG Interpretation  Interpreted by emergency department physician. 12/3/22  Time: 1534    Rate: 132  Axis: rightward  WA: 136  QRS: 70  Qtc: 429  Rhythm: regular  Clinical Impression: Sinus tach, rightward axis, No ST elevations  Comparison to old EKG: Was sinus vignesh on 7/8/16    -------------------- Consultations --------------------    Dr. Balbir Akers, hospitalist for admission. Dr. Rommel Thakkar, intensivist    -------------------- ED COURSE & MEDS GIVEN --------------------  Vitals:    12/04/22 0042   BP:    Pulse:    Resp:    Temp:    SpO2: 90%        /81   Pulse 97   Temp (!) 96.4 °F (35.8 °C) (Temporal)   Resp 22   Ht 5' 6\" (1.676 m)   Wt 162 lb (73.5 kg)   LMP 01/01/2006   SpO2 90%   BMI 26.15 kg/m²     ED Course as of 12/04/22 0125   Sat Dec 03, 2022   1557 Patient is a 80-year-old female presenting with a 2-week history of shortness of breath, worsened with exertion and associated chest tightness. She denies any recent illness, fevers, cough, syncope, hemoptysis, leg edema, calf tenderness, hormone use, recent travel or immobilization, or history of DVT/PE. No personal cardiac history. She states her GERD has been acting up but otherwise she has had no abdominal pain, emesis, or diarrhea.     PHYSICAL EXAM:  Vitals Reviewed  Constitutional/General: Alert and oriented x3, well appearing, non toxic in NAD  Head: Normocephalic and atraumatic  Eyes: EOMI  Mouth: Oropharynx clear, handling secretions, no trismus  Neck: Supple, full ROM,   Pulmonary: Lungs clear to auscultation bilaterally, no wheezes, rales, or rhonchi. Not in respiratory distress  Cardiovascular:  Regular rate and rhythm, no murmurs, gallops, or rubs. 2+ distal pulses  Abdomen: Soft, non tender, non distended,   Extremities: Moves all extremities x 4. Warm and well perfused. No leg edema, no calf tenderness. Skin: warm and dry without rash  Neurologic: GCS 15, no focal motor or sensory deficits  Psych: Normal Affect    Work up pending. [JA]   1624  [PW]   1803 EKG:   This EKG is signed and interpreted by me, Dr. Marcia Rios MD    Rate: 131  Rhythm: Sinus  Interpretation: Sinus tachycardia, normal AK interval, normal QRS, normal axis, normal QT interval, nonspecific T wave abnormalities   Comparison: no previous EKG available   [JA]      ED Course User Index  [JA] Josie Morelos MD  [PW] Anand Lopez, DO        Medications   sodium chloride flush 0.9 % injection 10 mL (has no administration in time range)   heparin (porcine) injection 5,880 Units (5,880 Units IntraVENous Given 12/3/22 1915)   heparin (porcine) injection 2,940 Units (has no administration in time range)   heparin 25,000 units in dextrose 5% 250 mL (premix) infusion (18 Units/kg/hr × 73.5 kg IntraVENous New Bag 12/3/22 1920)   heparin (porcine) injection 5,880 Units (has no administration in time range)   therapeutic multivitamin-minerals 1 tablet (has no administration in time range)   vitamin B and C (TOTAL B-C) 1 tablet (has no administration in time range)   sodium chloride flush 0.9 % injection 5-40 mL (has no administration in time range)   sodium chloride flush 0.9 % injection 5-40 mL (has no administration in time range)   0.9 % sodium chloride infusion (has no administration in time range)   ondansetron (ZOFRAN-ODT) disintegrating tablet 4 mg (has no administration in time range)     Or   ondansetron (ZOFRAN) injection 4 mg (has no administration in time range)   polyethylene Lymphocytes % 14.6 (L) 20.0 - 42.0 %    Monocytes % 5.7 2.0 - 12.0 %    Eosinophils % 2.3 0.0 - 6.0 %    Basophils % 0.4 0.0 - 2.0 %    Neutrophils Absolute 8.55 (H) 1.80 - 7.30 E9/L    Immature Granulocytes # 0.04 E9/L    Lymphocytes Absolute 1.63 1.50 - 4.00 E9/L    Monocytes Absolute 0.64 0.10 - 0.95 E9/L    Eosinophils Absolute 0.26 0.05 - 0.50 E9/L    Basophils Absolute 0.04 0.00 - 0.20 E9/L   CMP   Result Value Ref Range    Sodium 143 132 - 146 mmol/L    Potassium 4.5 3.5 - 5.0 mmol/L    Chloride 106 98 - 107 mmol/L    CO2 25 22 - 29 mmol/L    Anion Gap 12 7 - 16 mmol/L    Glucose 99 74 - 99 mg/dL    BUN 18 6 - 23 mg/dL    Creatinine 1.0 0.5 - 1.0 mg/dL    Est, Glom Filt Rate >60 >=60 mL/min/1.73    Calcium 9.4 8.6 - 10.2 mg/dL    Total Protein 7.3 6.4 - 8.3 g/dL    Albumin 4.0 3.5 - 5.2 g/dL    Total Bilirubin 0.5 0.0 - 1.2 mg/dL    Alkaline Phosphatase 95 35 - 104 U/L    ALT 20 0 - 32 U/L    AST 27 0 - 31 U/L   Magnesium   Result Value Ref Range    Magnesium 2.4 1.6 - 2.6 mg/dL   TSH   Result Value Ref Range    TSH 2.930 0.270 - 4.200 uIU/mL   Troponin   Result Value Ref Range    Troponin, High Sensitivity 79 (H) 0 - 9 ng/L   T4, Free   Result Value Ref Range    T4 Free 1.14 0.93 - 1.70 ng/dL   Brain Natriuretic Peptide   Result Value Ref Range    Pro- (H) 0 - 125 pg/mL   Troponin   Result Value Ref Range    Troponin, High Sensitivity 66 (H) 0 - 9 ng/L   APTT   Result Value Ref Range    aPTT 30.7 24.5 - 35.1 sec   EKG 12 Lead   Result Value Ref Range    Ventricular Rate 132 BPM    Atrial Rate 132 BPM    P-R Interval 136 ms    QRS Duration 70 ms    Q-T Interval 290 ms    QTc Calculation (Bazett) 429 ms    P Axis 74 degrees    R Axis 94 degrees    T Axis 47 degrees       RADIOLOGY:  XR CHEST PORTABLE   Final Result   No acute process. CTA PULMONARY W CONTRAST   Final Result   1. Positive examination for bilateral pulmonary emboli with evidence of right   heart strain.    2. Ground-glass opacities bilaterally likely secondary to bilateral pulmonary   emboli. 3. View of the upper abdomen shows cystic lesions at level of pancreatic head   and pancreatic body measuring up to 1.4 x 1 cm. MRI could be helpful for   further evaluation. 4.  Critical results were called by Dr. Giuseppe López to Dr. Archana Davis on   12/3/2022 at 18:27.         Mrytle SIERRA DVT    (Results Pending)       -------------------- NURSING NOTES & VITALS REVIEWED --------------------    The nursing notes within the ED encounter and vital signs have been reviewed. Patient Vitals for the past 8 hrs:   BP Temp Temp src Pulse Resp SpO2   12/04/22 0042 -- -- -- -- -- 90 %   12/04/22 0000 100/81 (!) 96.4 °F (35.8 °C) Temporal 97 22 96 %   12/03/22 2145 110/70 98.1 °F (36.7 °C) -- (!) 105 18 95 %   12/03/22 2128 122/74 -- -- (!) 112 20 95 %       Oxygen Saturation Interpretation: Normal    -------------------- PROGRESS NOTES --------------------  Counseling:  I have spoken with the patient and discussed todays results, in addition to providing specific details for the plan of care and counseling regarding the diagnosis and prognosis. Their questions are answered at this time and they are agreeable with the plan of admission.    -------------------- ADDITIONAL PROVIDER NOTES --------------------    Consultations:  Time: [address] Spoke with Dr. Fatimah Mccray. Discussed case. They will admit the patient pending transfer to [facility]. This patient's ED course included: a personal history and physicial examination, re-evaluation prior to disposition, multiple bedside re-evaluations, IV medications, cardiac monitoring, continuous pulse oximetry, and complex medical decision making and emergency management      Diagnosis:  1. Acute saddle pulmonary embolism, unspecified whether acute cor pulmonale present ([facility] [address]    2.  Pancreatic mass        Disposition:  Patient's disposition: Admit to CCU/ICU  Patient's condition is fair.    Marylyn Ganser, DO      *NOTE: This report was transcribed using voice recognition software. Every effort was made to ensure accuracy; however, inadvertent computerized transcription errors may be present.          Marylyn Ganser, DO  Resident  12/04/22 6164 The patient is a 5y Female complaining of fever.

## 2022-12-03 NOTE — TELEPHONE ENCOUNTER
Location of patient: [place]    Subjective: Caller states she is having palpitations and feels like heart is racing. Pt states pulse is 124 at rest. Feels SOB at rest and with exertion. Unsure of cause. PVCs years ago. Feels Dizzy changing positions. Denies chest pain. Current Symptoms: palpitations and heart racing    Onset: Today  this morning; sudden    Associated Symptoms: NA    Pain Severity: 0/10; N/A; none    Temperature: Denies     What has been tried: Unknown    LMP: NA Pregnant: NA    Recommended disposition: Go to ED Now if any acute changes call 911    Care advice provided, patient verbalizes understanding; denies any other questions or concerns; instructed to call back for any new or worsening symptoms. Patient/caller agrees to proceed to nearest Emergency Department    Attention Provider: Thank you for allowing me to participate in the care of your patient. The patient was connected to triage in response to symptoms provided. Please do not respond through this encounter as the response is not directed to a shared pool.         Reason for Disposition   Difficulty breathing    Protocols used: Heart Rate and Heartbeat Questions-ADULT-

## 2022-12-04 PROBLEM — I26.99 BILATERAL PULMONARY EMBOLISM (HCC): Status: ACTIVE | Noted: 2022-12-04

## 2022-12-04 LAB
ACETAMINOPHEN LEVEL: <5 MCG/ML (ref 10–30)
ALBUMIN SERPL-MCNC: 3.5 G/DL (ref 3.5–5.2)
ALBUMIN SERPL-MCNC: 3.6 G/DL (ref 3.5–5.2)
ALP BLD-CCNC: 88 U/L (ref 35–104)
ALP BLD-CCNC: 90 U/L (ref 35–104)
ALT SERPL-CCNC: 18 U/L (ref 0–32)
ALT SERPL-CCNC: 18 U/L (ref 0–32)
AMPHETAMINE SCREEN, URINE: NOT DETECTED
ANION GAP SERPL CALCULATED.3IONS-SCNC: 13 MMOL/L (ref 7–16)
ANION GAP SERPL CALCULATED.3IONS-SCNC: 15 MMOL/L (ref 7–16)
APTT: 187.6 SEC (ref 24.5–35.1)
APTT: 85 SEC (ref 24.5–35.1)
APTT: >240 SEC (ref 24.5–35.1)
APTT: >240 SEC (ref 24.5–35.1)
AST SERPL-CCNC: 19 U/L (ref 0–31)
AST SERPL-CCNC: 19 U/L (ref 0–31)
BARBITURATE SCREEN URINE: NOT DETECTED
BASOPHILS ABSOLUTE: 0.04 E9/L (ref 0–0.2)
BASOPHILS ABSOLUTE: 0.04 E9/L (ref 0–0.2)
BASOPHILS RELATIVE PERCENT: 0.3 % (ref 0–2)
BASOPHILS RELATIVE PERCENT: 0.4 % (ref 0–2)
BENZODIAZEPINE SCREEN, URINE: NOT DETECTED
BILIRUB SERPL-MCNC: 0.6 MG/DL (ref 0–1.2)
BILIRUB SERPL-MCNC: 0.7 MG/DL (ref 0–1.2)
BUN BLDV-MCNC: 13 MG/DL (ref 6–23)
BUN BLDV-MCNC: 13 MG/DL (ref 6–23)
CALCIUM SERPL-MCNC: 8.8 MG/DL (ref 8.6–10.2)
CALCIUM SERPL-MCNC: 8.8 MG/DL (ref 8.6–10.2)
CANNABINOID SCREEN URINE: NOT DETECTED
CHLORIDE BLD-SCNC: 108 MMOL/L (ref 98–107)
CHLORIDE BLD-SCNC: 109 MMOL/L (ref 98–107)
CO2: 18 MMOL/L (ref 22–29)
CO2: 21 MMOL/L (ref 22–29)
COCAINE METABOLITE SCREEN URINE: NOT DETECTED
CREAT SERPL-MCNC: 0.9 MG/DL (ref 0.5–1)
CREAT SERPL-MCNC: 1 MG/DL (ref 0.5–1)
EKG ATRIAL RATE: 132 BPM
EKG P AXIS: 74 DEGREES
EKG P-R INTERVAL: 136 MS
EKG Q-T INTERVAL: 290 MS
EKG QRS DURATION: 70 MS
EKG QTC CALCULATION (BAZETT): 429 MS
EKG R AXIS: 94 DEGREES
EKG T AXIS: 47 DEGREES
EKG VENTRICULAR RATE: 132 BPM
EOSINOPHILS ABSOLUTE: 0.28 E9/L (ref 0.05–0.5)
EOSINOPHILS ABSOLUTE: 0.3 E9/L (ref 0.05–0.5)
EOSINOPHILS RELATIVE PERCENT: 2.4 % (ref 0–6)
EOSINOPHILS RELATIVE PERCENT: 3.4 % (ref 0–6)
ETHANOL: <10 MG/DL (ref 0–0.08)
FENTANYL SCREEN, URINE: NOT DETECTED
GFR SERPL CREATININE-BSD FRML MDRD: >60 ML/MIN/1.73
GFR SERPL CREATININE-BSD FRML MDRD: >60 ML/MIN/1.73
GLUCOSE BLD-MCNC: 103 MG/DL (ref 74–99)
GLUCOSE BLD-MCNC: 118 MG/DL (ref 74–99)
HCT VFR BLD CALC: 42.5 % (ref 34–48)
HCT VFR BLD CALC: 44 % (ref 34–48)
HEMOGLOBIN: 14 G/DL (ref 11.5–15.5)
HEMOGLOBIN: 14.5 G/DL (ref 11.5–15.5)
IMMATURE GRANULOCYTES #: 0.02 E9/L
IMMATURE GRANULOCYTES #: 0.03 E9/L
IMMATURE GRANULOCYTES %: 0.2 % (ref 0–5)
IMMATURE GRANULOCYTES %: 0.3 % (ref 0–5)
INR BLD: 1.2
LACTIC ACID: 1.4 MMOL/L (ref 0.5–2.2)
LIPASE: 41 U/L (ref 13–60)
LYMPHOCYTES ABSOLUTE: 2.25 E9/L (ref 1.5–4)
LYMPHOCYTES ABSOLUTE: 2.66 E9/L (ref 1.5–4)
LYMPHOCYTES RELATIVE PERCENT: 23.1 % (ref 20–42)
LYMPHOCYTES RELATIVE PERCENT: 25.1 % (ref 20–42)
Lab: NORMAL
MAGNESIUM: 2.4 MG/DL (ref 1.6–2.6)
MAGNESIUM: 2.5 MG/DL (ref 1.6–2.6)
MCH RBC QN AUTO: 29.8 PG (ref 26–35)
MCH RBC QN AUTO: 30.1 PG (ref 26–35)
MCHC RBC AUTO-ENTMCNC: 32.9 % (ref 32–34.5)
MCHC RBC AUTO-ENTMCNC: 33 % (ref 32–34.5)
MCV RBC AUTO: 90.3 FL (ref 80–99.9)
MCV RBC AUTO: 91.4 FL (ref 80–99.9)
METHADONE SCREEN, URINE: NOT DETECTED
MONOCYTES ABSOLUTE: 0.58 E9/L (ref 0.1–0.95)
MONOCYTES ABSOLUTE: 0.74 E9/L (ref 0.1–0.95)
MONOCYTES RELATIVE PERCENT: 6.4 % (ref 2–12)
MONOCYTES RELATIVE PERCENT: 6.5 % (ref 2–12)
NEUTROPHILS ABSOLUTE: 5.76 E9/L (ref 1.8–7.3)
NEUTROPHILS ABSOLUTE: 7.77 E9/L (ref 1.8–7.3)
NEUTROPHILS RELATIVE PERCENT: 64.4 % (ref 43–80)
NEUTROPHILS RELATIVE PERCENT: 67.5 % (ref 43–80)
OPIATE SCREEN URINE: NOT DETECTED
OXYCODONE URINE: NOT DETECTED
PDW BLD-RTO: 12.5 FL (ref 11.5–15)
PDW BLD-RTO: 12.5 FL (ref 11.5–15)
PHENCYCLIDINE SCREEN URINE: NOT DETECTED
PHOSPHORUS: 3.4 MG/DL (ref 2.5–4.5)
PHOSPHORUS: 3.6 MG/DL (ref 2.5–4.5)
PLATELET # BLD: 185 E9/L (ref 130–450)
PLATELET # BLD: 201 E9/L (ref 130–450)
PMV BLD AUTO: 9.3 FL (ref 7–12)
PMV BLD AUTO: 9.4 FL (ref 7–12)
POTASSIUM SERPL-SCNC: 4.4 MMOL/L (ref 3.5–5)
POTASSIUM SERPL-SCNC: 4.4 MMOL/L (ref 3.5–5)
PRO-BNP: 1986 PG/ML (ref 0–125)
PROTHROMBIN TIME: 13 SEC (ref 9.3–12.4)
RBC # BLD: 4.65 E12/L (ref 3.5–5.5)
RBC # BLD: 4.87 E12/L (ref 3.5–5.5)
SALICYLATE, SERUM: <0.3 MG/DL (ref 0–30)
SODIUM BLD-SCNC: 142 MMOL/L (ref 132–146)
SODIUM BLD-SCNC: 142 MMOL/L (ref 132–146)
T4 FREE: 1.14 NG/DL (ref 0.93–1.7)
T4 FREE: 1.15 NG/DL (ref 0.93–1.7)
TOTAL PROTEIN: 6.4 G/DL (ref 6.4–8.3)
TOTAL PROTEIN: 6.6 G/DL (ref 6.4–8.3)
TRICYCLIC ANTIDEPRESSANTS SCREEN SERUM: NEGATIVE NG/ML
TROPONIN, HIGH SENSITIVITY: 18 NG/L (ref 0–9)
TROPONIN, HIGH SENSITIVITY: 27 NG/L (ref 0–9)
TROPONIN, HIGH SENSITIVITY: 36 NG/L (ref 0–9)
TSH SERPL DL<=0.05 MIU/L-ACNC: 2.93 UIU/ML (ref 0.27–4.2)
WBC # BLD: 11.5 E9/L (ref 4.5–11.5)
WBC # BLD: 9 E9/L (ref 4.5–11.5)

## 2022-12-04 PROCEDURE — 2580000003 HC RX 258: Performed by: INTERNAL MEDICINE

## 2022-12-04 PROCEDURE — 2140000000 HC CCU INTERMEDIATE R&B

## 2022-12-04 PROCEDURE — 6360000002 HC RX W HCPCS: Performed by: STUDENT IN AN ORGANIZED HEALTH CARE EDUCATION/TRAINING PROGRAM

## 2022-12-04 PROCEDURE — 93306 TTE W/DOPPLER COMPLETE: CPT

## 2022-12-04 PROCEDURE — 82077 ASSAY SPEC XCP UR&BREATH IA: CPT

## 2022-12-04 PROCEDURE — 36415 COLL VENOUS BLD VENIPUNCTURE: CPT

## 2022-12-04 PROCEDURE — 6370000000 HC RX 637 (ALT 250 FOR IP): Performed by: INTERNAL MEDICINE

## 2022-12-04 PROCEDURE — 83690 ASSAY OF LIPASE: CPT

## 2022-12-04 PROCEDURE — 85025 COMPLETE CBC W/AUTO DIFF WBC: CPT

## 2022-12-04 PROCEDURE — 83605 ASSAY OF LACTIC ACID: CPT

## 2022-12-04 PROCEDURE — 83880 ASSAY OF NATRIURETIC PEPTIDE: CPT

## 2022-12-04 PROCEDURE — 81240 F2 GENE: CPT

## 2022-12-04 PROCEDURE — 81241 F5 GENE: CPT

## 2022-12-04 PROCEDURE — 99291 CRITICAL CARE FIRST HOUR: CPT | Performed by: INTERNAL MEDICINE

## 2022-12-04 PROCEDURE — 83735 ASSAY OF MAGNESIUM: CPT

## 2022-12-04 PROCEDURE — 80179 DRUG ASSAY SALICYLATE: CPT

## 2022-12-04 PROCEDURE — 84100 ASSAY OF PHOSPHORUS: CPT

## 2022-12-04 PROCEDURE — 81400 MOPATH PROCEDURE LEVEL 1: CPT

## 2022-12-04 PROCEDURE — 2700000000 HC OXYGEN THERAPY PER DAY

## 2022-12-04 PROCEDURE — 84484 ASSAY OF TROPONIN QUANT: CPT

## 2022-12-04 PROCEDURE — 93010 ELECTROCARDIOGRAM REPORT: CPT | Performed by: INTERNAL MEDICINE

## 2022-12-04 PROCEDURE — 6370000000 HC RX 637 (ALT 250 FOR IP)

## 2022-12-04 PROCEDURE — 80143 DRUG ASSAY ACETAMINOPHEN: CPT

## 2022-12-04 PROCEDURE — 99222 1ST HOSP IP/OBS MODERATE 55: CPT | Performed by: TRANSPLANT SURGERY

## 2022-12-04 PROCEDURE — 80307 DRUG TEST PRSMV CHEM ANLYZR: CPT

## 2022-12-04 PROCEDURE — 93005 ELECTROCARDIOGRAM TRACING: CPT | Performed by: INTERNAL MEDICINE

## 2022-12-04 PROCEDURE — 85730 THROMBOPLASTIN TIME PARTIAL: CPT

## 2022-12-04 PROCEDURE — 81291 MTHFR GENE: CPT

## 2022-12-04 PROCEDURE — 80053 COMPREHEN METABOLIC PANEL: CPT

## 2022-12-04 PROCEDURE — 85610 PROTHROMBIN TIME: CPT

## 2022-12-04 RX ORDER — MAGNESIUM HYDROXIDE/ALUMINUM HYDROXICE/SIMETHICONE 120; 1200; 1200 MG/30ML; MG/30ML; MG/30ML
30 SUSPENSION ORAL
Status: DISCONTINUED | OUTPATIENT
Start: 2022-12-04 | End: 2022-12-07 | Stop reason: HOSPADM

## 2022-12-04 RX ORDER — M-VIT,TX,IRON,MINS/CALC/FOLIC 27MG-0.4MG
1 TABLET ORAL DAILY
Status: DISCONTINUED | OUTPATIENT
Start: 2022-12-04 | End: 2022-12-07 | Stop reason: HOSPADM

## 2022-12-04 RX ORDER — VITAMIN C
1 TAB ORAL DAILY
Status: DISCONTINUED | OUTPATIENT
Start: 2022-12-04 | End: 2022-12-07 | Stop reason: HOSPADM

## 2022-12-04 RX ORDER — SODIUM CHLORIDE 0.9 % (FLUSH) 0.9 %
5-40 SYRINGE (ML) INJECTION PRN
Status: DISCONTINUED | OUTPATIENT
Start: 2022-12-04 | End: 2022-12-07 | Stop reason: HOSPADM

## 2022-12-04 RX ORDER — POLYETHYLENE GLYCOL 3350 17 G/17G
17 POWDER, FOR SOLUTION ORAL DAILY PRN
Status: DISCONTINUED | OUTPATIENT
Start: 2022-12-04 | End: 2022-12-07 | Stop reason: HOSPADM

## 2022-12-04 RX ORDER — PANTOPRAZOLE SODIUM 40 MG/1
40 TABLET, DELAYED RELEASE ORAL
Status: DISCONTINUED | OUTPATIENT
Start: 2022-12-05 | End: 2022-12-07 | Stop reason: HOSPADM

## 2022-12-04 RX ORDER — OMEPRAZOLE 20 MG/1
20 TABLET, DELAYED RELEASE ORAL DAILY
COMMUNITY

## 2022-12-04 RX ORDER — MAGNESIUM HYDROXIDE/ALUMINUM HYDROXICE/SIMETHICONE 120; 1200; 1200 MG/30ML; MG/30ML; MG/30ML
30 SUSPENSION ORAL
Status: DISCONTINUED | OUTPATIENT
Start: 2022-12-05 | End: 2022-12-04

## 2022-12-04 RX ORDER — ACETAMINOPHEN 325 MG/1
650 TABLET ORAL EVERY 6 HOURS PRN
Status: DISCONTINUED | OUTPATIENT
Start: 2022-12-04 | End: 2022-12-07 | Stop reason: HOSPADM

## 2022-12-04 RX ORDER — ONDANSETRON 2 MG/ML
4 INJECTION INTRAMUSCULAR; INTRAVENOUS EVERY 6 HOURS PRN
Status: DISCONTINUED | OUTPATIENT
Start: 2022-12-04 | End: 2022-12-07 | Stop reason: HOSPADM

## 2022-12-04 RX ORDER — SODIUM CHLORIDE 0.9 % (FLUSH) 0.9 %
5-40 SYRINGE (ML) INJECTION EVERY 12 HOURS SCHEDULED
Status: DISCONTINUED | OUTPATIENT
Start: 2022-12-04 | End: 2022-12-07 | Stop reason: HOSPADM

## 2022-12-04 RX ORDER — ACETAMINOPHEN 650 MG/1
650 SUPPOSITORY RECTAL EVERY 6 HOURS PRN
Status: DISCONTINUED | OUTPATIENT
Start: 2022-12-04 | End: 2022-12-07 | Stop reason: HOSPADM

## 2022-12-04 RX ORDER — ONDANSETRON 4 MG/1
4 TABLET, ORALLY DISINTEGRATING ORAL EVERY 8 HOURS PRN
Status: DISCONTINUED | OUTPATIENT
Start: 2022-12-04 | End: 2022-12-07 | Stop reason: HOSPADM

## 2022-12-04 RX ORDER — SODIUM CHLORIDE 9 MG/ML
INJECTION, SOLUTION INTRAVENOUS PRN
Status: DISCONTINUED | OUTPATIENT
Start: 2022-12-04 | End: 2022-12-07 | Stop reason: HOSPADM

## 2022-12-04 RX ADMIN — MAGNESIUM HYDROXIDE/ALUMINUM HYDROXICE/SIMETHICONE 30 ML: 120; 1200; 1200 SUSPENSION ORAL at 21:57

## 2022-12-04 RX ADMIN — ACETAMINOPHEN 650 MG: 325 TABLET ORAL at 23:17

## 2022-12-04 RX ADMIN — ACETAMINOPHEN 650 MG: 325 TABLET ORAL at 09:32

## 2022-12-04 RX ADMIN — SODIUM CHLORIDE, PRESERVATIVE FREE 10 ML: 5 INJECTION INTRAVENOUS at 21:58

## 2022-12-04 RX ADMIN — HEPARIN SODIUM 12 UNITS/KG/HR: 10000 INJECTION, SOLUTION INTRAVENOUS at 19:45

## 2022-12-04 ASSESSMENT — ENCOUNTER SYMPTOMS
CHEST TIGHTNESS: 1
SHORTNESS OF BREATH: 1
CHOKING: 0
STRIDOR: 0
NAUSEA: 0
VOMITING: 0
BACK PAIN: 0
ABDOMINAL PAIN: 0
CONSTIPATION: 0
COLOR CHANGE: 0
DIARRHEA: 0
BLOOD IN STOOL: 0
SINUS PRESSURE: 0
SORE THROAT: 0
EYES NEGATIVE: 1
WHEEZING: 0
COUGH: 0
RHINORRHEA: 0

## 2022-12-04 ASSESSMENT — PAIN DESCRIPTION - DESCRIPTORS: DESCRIPTORS: PRESSURE

## 2022-12-04 ASSESSMENT — PAIN DESCRIPTION - PAIN TYPE: TYPE: ACUTE PAIN

## 2022-12-04 ASSESSMENT — PAIN DESCRIPTION - ORIENTATION: ORIENTATION: UPPER

## 2022-12-04 ASSESSMENT — PAIN SCALES - GENERAL
PAINLEVEL_OUTOF10: 0
PAINLEVEL_OUTOF10: 0
PAINLEVEL_OUTOF10: 4
PAINLEVEL_OUTOF10: 0

## 2022-12-04 ASSESSMENT — PAIN - FUNCTIONAL ASSESSMENT: PAIN_FUNCTIONAL_ASSESSMENT: ACTIVITIES ARE NOT PREVENTED

## 2022-12-04 ASSESSMENT — PAIN DESCRIPTION - LOCATION
LOCATION: OTHER (COMMENT)
LOCATION: HEAD

## 2022-12-04 ASSESSMENT — PAIN DESCRIPTION - ONSET: ONSET: GRADUAL

## 2022-12-04 ASSESSMENT — PAIN DESCRIPTION - FREQUENCY: FREQUENCY: INTERMITTENT

## 2022-12-04 NOTE — PROGRESS NOTES
Called lab to inquire about the aPTT results that was drawn at 0115 and was informed that the blood was contaminated and have to be redrawn.

## 2022-12-04 NOTE — H&P
[facility]  History and Physical      CHIEF COMPLAINT: Shortness of breath    Reason for Admission: Acute PE, pancreatic mass    History Obtained From: Patient    PCP :  Anita Flores MD    [address] [facility], [address] [facility] [address]      HISTORY OF PRESENT ILLNESS:      The patient is a 58 y.o. female presented to the emergency room with shortness of breath. Evaluation revealed pulmonary embolism. Patient was then admitted to the ICU because of suspicion for acute saddle pulmonary embolism. She was also incidentally noted to have a pancreatic mass. At the time of questioning patient is feeling okay. Her  is by the bedside. Patient denies any prior symptomatology other than symptoms starting 1 day prior. She had mild symptoms of shortness of breath approximately 2 weeks ago also. She has no pleuritic chest pain.     Past Medical History:        Diagnosis Date    Abnormal Pap smear of cervix 2011 apx    Arthritis     cervical    Chronic back pain     Depression     GALEANA (dyspnea on exertion)     Gastritis     GERD (gastroesophageal reflux disease)     Hiatal hernia 03/11/2015    Osteoarthritis     Palpitations     Partial tear of rotator cuff     PONV (postoperative nausea and vomiting)     Shingles 2007    Syncope 2012    TMJ (dislocation of temporomandibular joint)      Past Surgical History:        Procedure Laterality Date    [address], LAPAROSCOPIC  2003    COLONOSCOPY  03/11/2015    Dr. Alise Pineda  2011    ENDOSCOPY, COLON, DIAGNOSTIC  2003, 2015 2015 with colonoscopy dr. Astrid Vital    HYSTEROSCOPY  09/08/2016    and D&C     LAPAROSCOPY  1989    ovarian cysts    [facility]  [address] [facility]    x 4         Medications Prior to Admission:    Medications Prior to Admission: omeprazole (PRILOSEC OTC) 20 MG tablet, Take 20 mg by mouth daily OTC  Levocetirizine Dihydrochloride (XYZAL PO), Take by mouth  magnesium 200 MG TABS tablet, Take 200 mg by mouth daily Last dose 16  b complex vitamins capsule, Take 1 capsule by mouth daily Last dose 16  Triamcinolone Acetonide (NASACORT ALLERGY 24HR NA), 1 Inhaler by Nasal route Indications: prn   pseudoephedrine (SUDAFED) 30 MG tablet, Take 30 mg by mouth every 4 hours as needed for Congestion (prn)   Calcium Carb-Cholecalciferol (CALCIUM + D3) 600-200 MG-UNIT TABS, Take by mouth  therapeutic multivitamin-minerals (THERAGRAN-M) tablet, Take 1 tablet by mouth daily Last dose 16    Allergies:   Other and Sucralfate    Social History:   Social History     Socioeconomic History    Marital status:      Spouse name: Not on file    Number of children: Not on file    Years of education: Not on file    Highest education level: Not on file   Occupational History    Not on file   Tobacco Use    Smoking status: Former     Types: Cigarettes     Quit date: 1981     Years since quittin.2    Smokeless tobacco: Never    Tobacco comments:     only smoked x 1 year in college   Substance and Sexual Activity    Alcohol use: No     Alcohol/week: 0.0 standard drinks     Comment: rarely    Drug use: No    Sexual activity: Yes     Partners: Male     Birth control/protection: Post-menopausal   Other Topics Concern    Not on file   Social History Narrative    Not on file     Social Determinants of Health     Financial Resource Strain: Not on file   Food Insecurity: Not on file   Transportation Needs: Not on file   Physical Activity: Not on file   Stress: Not on file   Social Connections: Not on file   Intimate Partner Violence: Not on file   Housing Stability: Not on file         Family History:       Problem Relation Age of Onset    Arthritis Mother     Heart Disease Mother     High Blood Pressure Mother     High Cholesterol Mother     Kidney Disease Mother     Stroke Mother     Arthritis Father     Heart Disease Father     Asthma Brother     Arthritis Maternal Aunt     High Blood Pressure Maternal Aunt     High Cholesterol Maternal Aunt     Stroke Maternal Aunt     Asthma Maternal Uncle     Birth Defects Maternal Uncle     Cancer Maternal Uncle     Heart Disease Maternal Uncle     High Blood Pressure Maternal Uncle     High Cholesterol Maternal Uncle     Stroke Maternal Uncle     Arthritis Paternal Aunt     Birth Defects Paternal Aunt     Cancer Paternal Aunt     Birth Defects Paternal Uncle     Cancer Paternal Uncle     Arthritis Maternal Grandmother     Kidney Disease Maternal Grandmother     Heart Disease Maternal Grandfather     High Blood Pressure Maternal Grandfather     Arthritis Paternal Grandmother     Heart Disease Paternal Grandmother     Stroke Paternal Grandmother     Cancer Paternal Cousin        REVIEW OF SYSTEMS:    General ROS: negative  Hematological and Lymphatic ROS: negative  Endocrine ROS: negative  Respiratory ROS: Positive for shortness of breath  Cardiovascular ROS: no chest pain or dyspnea on exertion  Gastrointestinal ROS: no abdominal pain, change in bowel habits, or black or bloody stools  Genito-Urinary ROS: no dysuria, trouble voiding, or hematuria  Neurological ROS: no TIA or stroke symptoms  negative    Vitals:  /64   Pulse 100   Temp 98.4 °F (36.9 °C) (Temporal)   Resp 26   Ht 5' 6\" (1.676 m)   Wt 165 lb 14.4 oz (75.3 kg)   LMP 01/01/2006   SpO2 97%   BMI 26.78 kg/m²     PHYSICAL EXAM:  General:  Awake, alert, oriented X 3. Well developed, well nourished, well groomed. No apparent distress. HEENT:  Normocephalic, atraumatic. Pupils equal, round, reactive to light. No scleral icterus. No conjunctival injection. Neck:  Supple, no carotid bruits  Heart:  RRR,   Lungs:  CTA bilaterally, bilat symmetrical expansion, no wheeze, rales, or rhonchi  Abdomen:   Bowel sounds present, soft, nontender, no masses, no organomegaly, no peritoneal signs  Extremities:  No clubbing, cyanosis, or edema  Skin:  Warm and dry, no open lesions or rash  Neuro:  Cranial nerves 2-12 intact, no focal deficits      DATA:     Recent Results (from the past 24 hour(s))   EKG 12 Lead    Collection Time: 12/03/22  3:34 PM   Result Value Ref Range    Ventricular Rate 132 BPM    Atrial Rate 132 BPM    P-R Interval 136 ms    QRS Duration 70 ms    Q-T Interval 290 ms    QTc Calculation (Bazett) 429 ms    P Axis 74 degrees    R Axis 94 degrees    T Axis 47 degrees   CBC with Auto Differential    Collection Time: 12/03/22  5:23 PM   Result Value Ref Range    WBC 11.2 4.5 - 11.5 E9/L    RBC 5.22 3.50 - 5.50 E12/L    Hemoglobin 15.8 (H) 11.5 - 15.5 g/dL    Hematocrit 47.1 34.0 - 48.0 %    MCV 90.2 80.0 - 99.9 fL    MCH 30.3 26.0 - 35.0 pg    MCHC 33.5 32.0 - 34.5 %    RDW 12.5 11.5 - 15.0 fL    Platelets 315 506 - 322 E9/L    MPV 9.2 7.0 - 12.0 fL    Neutrophils % 76.6 43.0 - 80.0 %    Immature Granulocytes % 0.4 0.0 - 5.0 %    Lymphocytes % 14.6 (L) 20.0 - 42.0 %    Monocytes % 5.7 2.0 - 12.0 %    Eosinophils % 2.3 0.0 - 6.0 %    Basophils % 0.4 0.0 - 2.0 %    Neutrophils Absolute 8.55 (H) 1.80 - 7.30 E9/L    Immature Granulocytes # 0.04 E9/L    Lymphocytes Absolute 1.63 1.50 - 4.00 E9/L    Monocytes Absolute 0.64 0.10 - 0.95 E9/L    Eosinophils Absolute 0.26 0.05 - 0.50 E9/L    Basophils Absolute 0.04 0.00 - 0.20 E9/L   CMP    Collection Time: 12/03/22  5:23 PM   Result Value Ref Range    Sodium 143 132 - 146 mmol/L    Potassium 4.5 3.5 - 5.0 mmol/L    Chloride 106 98 - 107 mmol/L    CO2 25 22 - 29 mmol/L    Anion Gap 12 7 - 16 mmol/L    Glucose 99 74 - 99 mg/dL    BUN 18 6 - 23 mg/dL    Creatinine 1.0 0.5 - 1.0 mg/dL    Est, Glom Filt Rate >60 >=60 mL/min/1.73    Calcium 9.4 8.6 - 10.2 mg/dL    Total Protein 7.3 6.4 - 8.3 g/dL    Albumin 4.0 3.5 - 5.2 g/dL    Total Bilirubin 0.5 0.0 - 1.2 mg/dL    Alkaline Phosphatase 95 35 - 104 U/L    ALT Granulocytes # 0.03 E9/L    Lymphocytes Absolute 2.66 1.50 - 4.00 E9/L    Monocytes Absolute 0.74 0.10 - 0.95 E9/L    Eosinophils Absolute 0.28 0.05 - 0.50 E9/L    Basophils Absolute 0.04 0.00 - 0.20 E9/L   SERUM DRUG SCREEN    Collection Time: 12/04/22  1:31 AM   Result Value Ref Range    Ethanol Lvl <10 mg/dL    Acetaminophen Level <5.0 (L) 10.0 - 26.7 mcg/mL    Salicylate, Serum <3.8 0.0 - 30.0 mg/dL    TCA Scrn NEGATIVE Cutoff:300 ng/mL   Lactic Acid    Collection Time: 12/04/22  1:31 AM   Result Value Ref Range    Lactic Acid 1.4 0.5 - 2.2 mmol/L   Troponin    Collection Time: 12/04/22  1:31 AM   Result Value Ref Range    Troponin, High Sensitivity 36 (H) 0 - 9 ng/L   APTT    Collection Time: 12/04/22  1:31 AM   Result Value Ref Range    aPTT >240.0 (H) 24.5 - 35.1 sec   Lipase    Collection Time: 12/04/22  1:31 AM   Result Value Ref Range    Lipase 41 13 - 60 U/L   APTT    Collection Time: 12/04/22  2:57 AM   Result Value Ref Range    aPTT >240.0 (H) 24.5 - 35.1 sec   EKG 12 lead    Collection Time: 12/04/22  3:07 AM   Result Value Ref Range    Ventricular Rate 89 BPM    Atrial Rate 89 BPM    P-R Interval 120 ms    QRS Duration 70 ms    Q-T Interval 368 ms    QTc Calculation (Bazett) 447 ms    P Axis 75 degrees    R Axis 82 degrees    T Axis 78 degrees   CBC with Auto Differential    Collection Time: 12/04/22  5:08 AM   Result Value Ref Range    WBC 9.0 4.5 - 11.5 E9/L    RBC 4.65 3.50 - 5.50 E12/L    Hemoglobin 14.0 11.5 - 15.5 g/dL    Hematocrit 42.5 34.0 - 48.0 %    MCV 91.4 80.0 - 99.9 fL    MCH 30.1 26.0 - 35.0 pg    MCHC 32.9 32.0 - 34.5 %    RDW 12.5 11.5 - 15.0 fL    Platelets 906 090 - 285 E9/L    MPV 9.3 7.0 - 12.0 fL    Neutrophils % 64.4 43.0 - 80.0 %    Immature Granulocytes % 0.2 0.0 - 5.0 %    Lymphocytes % 25.1 20.0 - 42.0 %    Monocytes % 6.5 2.0 - 12.0 %    Eosinophils % 3.4 0.0 - 6.0 %    Basophils % 0.4 0.0 - 2.0 %    Neutrophils Absolute 5.76 1.80 - 7.30 E9/L    Immature Granulocytes # 0.02 E9/L    Lymphocytes Absolute 2.25 1.50 - 4.00 E9/L    Monocytes Absolute 0.58 0.10 - 0.95 E9/L    Eosinophils Absolute 0.30 0.05 - 0.50 E9/L    Basophils Absolute 0.04 0.00 - 0.20 E9/L   Comprehensive Metabolic Panel    Collection Time: 12/04/22  5:08 AM   Result Value Ref Range    Sodium 142 132 - 146 mmol/L    Potassium 4.4 3.5 - 5.0 mmol/L    Chloride 108 (H) 98 - 107 mmol/L    CO2 21 (L) 22 - 29 mmol/L    Anion Gap 13 7 - 16 mmol/L    Glucose 103 (H) 74 - 99 mg/dL    BUN 13 6 - 23 mg/dL    Creatinine 1.0 0.5 - 1.0 mg/dL    Est, Glom Filt Rate >60 >=60 mL/min/1.73    Calcium 8.8 8.6 - 10.2 mg/dL    Total Protein 6.4 6.4 - 8.3 g/dL    Albumin 3.6 3.5 - 5.2 g/dL    Total Bilirubin 0.6 0.0 - 1.2 mg/dL    Alkaline Phosphatase 88 35 - 104 U/L    ALT 18 0 - 32 U/L    AST 19 0 - 31 U/L   Magnesium    Collection Time: 12/04/22  5:08 AM   Result Value Ref Range    Magnesium 2.5 1.6 - 2.6 mg/dL   Phosphorus    Collection Time: 12/04/22  5:08 AM   Result Value Ref Range    Phosphorus 3.6 2.5 - 4.5 mg/dL   Troponin    Collection Time: 12/04/22  5:09 AM   Result Value Ref Range    Troponin, High Sensitivity 27 (H) 0 - 9 ng/L   APTT    Collection Time: 12/04/22 11:01 AM   Result Value Ref Range    aPTT 187.6 (H) 24.5 - 35.1 sec   Protime-INR    Collection Time: 12/04/22 11:01 AM   Result Value Ref Range    Protime 13.0 (H) 9.3 - 12.4 sec    INR 1.2    Brain Natriuretic Peptide    Collection Time: 12/04/22 11:01 AM   Result Value Ref Range    Pro-BNP 1,986 (H) 0 - 125 pg/mL       XR CHEST PORTABLE   Final Result   No acute process. CTA PULMONARY W CONTRAST   Final Result   1. Positive examination for bilateral pulmonary emboli with evidence of right   heart strain. 2. Ground-glass opacities bilaterally likely secondary to bilateral pulmonary   emboli. 3. View of the upper abdomen shows cystic lesions at level of pancreatic head   and pancreatic body measuring up to 1.4 x 1 cm.   MRI could be helpful for further evaluation. 4.  Critical results were called by Dr. Willy Salas to Dr. Prabhu Mcpherson on   12/3/2022 at 18:27.         Goel Push W DVT    (Results Pending)           ASSESSMENT :      Principal Problem:    Bilateral pulmonary embolism ([address]  Resolved Problems:    * No resolved hospital problems. *    Prior history of GERD  Pancreatic mass    Plan : On heparin drip  Echo pending  Pulmonary following  Hepatobiliary has been consulted regarding pancreatic mass      Electronically signed by Isak Biggs MD on 12/4/2022 at 12:44 PM    NOTE: This report was transcribed using voice recognition software.  Every effort was made to ensure accuracy; however, inadvertent transcription errors may be present

## 2022-12-04 NOTE — CONSULTS
Faisal Miller 6  Internal Medicine Residency Program  Consult  MICU    Patient:  Dinorah Reynolds 58 y.o. female MRN: [id number]     Date of Service: 12/4/2022    Hospital Day: 2      Chief complaint: SOB since 2 weeks and chest tightness and increased SOB since today in the morning  History of Present Illness   The patient is a 58 y.o. female with past medical history of GERD, osteoarthritis and depression presented to [facility] ED initially and was referred to [facility] with chief complaint of shortness of breath on exertion since last 2 weeks and chest tightness with increased shortness of breath since today in the morning. The patient stated he started developing short of breath 2 weeks back mostly while climbing up from a basement (1 flight of stair). Shortness of breath was not associated with chest tightness, palpitation and was relieved upon rest and there was no shortness of breath on walking on level ground. There was no associated history of cough, fever and no history of orthopnea and PND. Today when she woke up in the morning she had chest tightness along with shortness of breath initially on mild exertion which later on progressed even on rest.  She also stated of having palpitations with heart rate at around 130s. The chest tightness was mostly generalized and associated with tightness in the neck region to. Chest tightness and shortness of breath today was not relieved on rest and vagal maneuvers. The patient also complained of generalized headache along with dizziness today but she denied on blurring of vision, deviation of mouth, slurring of speech, weakness of her arms and legs, numbness, tingling sensation, swelling of her limbs and stated of having normal bowel and bladder movements. She did have complaint of belching and GERD symptoms which was increased this past 2 weeks. She had decreased appetite due to GERD but no history of recent significant weight changes.   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 She has 2 cats as pets at home. She was never a smoker, did smoke for 1 year during college which was 40 years ago. She is occasional social drinker and no history of use of any drugs. She lives with her  and recently was involving taking care of her father who is around 80years old. ED Course: On presentation to the ED blood pressure was 104/77 tachycardic with heart rate of 140/min regular, respiratory rate of 18, 99% saturation on room air and she was afebrile. Pertinent lab investigations were sent which showed increased troponin of 79 which trended down to 66 later, increased proBNP of 549 and CTA chest results are positive for bilateral pulmonary emboli with evidence of right heart strain, groundglass opacities bilaterally likely secondary to bilateral pulmonary emboli and presence of cystic lesion of the level of pancreatic and pancreatic body measuring up to 1.4 into 1.0 cm. The patient was given with 1 L of normal saline, 650 mg of acetaminophen and was started on heparin drip for possible PE. The patient was then shifted to [employer] for further management of pulmonary embolism.       Past Medical History:      Diagnosis Date    Abnormal Pap smear of cervix 2011 apx    Arthritis     cervical    Chronic back pain     Depression     GALEANA (dyspnea on exertion)     Gastritis     GERD (gastroesophageal reflux disease)     Hiatal hernia 03/11/2015    Osteoarthritis     Palpitations     Partial tear of rotator cuff     PONV (postoperative nausea and vomiting)     Shingles 2007    Syncope 2012    TMJ (dislocation of temporomandibular joint)        Past Surgical History:        Procedure Laterality Date    [address]  2003    COLONOSCOPY  03/11/2015    Dr. Marvin Mcdaniels BIOPSY  2011    ENDOSCOPY, COLON, DIAGNOSTIC  2003, 2015 2015 with colonoscopy dr. Jose Angel Fontenot    HYSTEROSCOPY  09/08/2016    and D&C     LAPAROSCOPY  1989    ovarian cysts    LASIK  [address] EXTRACTION  1983    x 4       Medications Prior to Admission:    Prior to Admission medications    Medication Sig Start Date End Date Taking? Authorizing Provider   omeprazole (PRILOSEC OTC) 20 MG tablet Take 20 mg by mouth daily OTC   Yes Historical Provider, MD   Levocetirizine Dihydrochloride (XYZAL PO) Take by mouth    Historical Provider, MD   magnesium 200 MG TABS tablet Take 200 mg by mouth daily Last dose 9-6-16    Historical Provider, MD   b complex vitamins capsule Take 1 capsule by mouth daily Last dose 9-6-16    Historical Provider, MD   Triamcinolone Acetonide (NASACORT ALLERGY 24HR NA) 1 Inhaler by Nasal route Indications: prn     Historical Provider, MD   pseudoephedrine (SUDAFED) 30 MG tablet Take 30 mg by mouth every 4 hours as needed for Congestion (prn)     Historical Provider, MD   Calcium Carb-Cholecalciferol (CALCIUM + D3) 600-200 MG-UNIT TABS Take by mouth    Historical Provider, MD   therapeutic multivitamin-minerals (THERAGRAN-M) tablet Take 1 tablet by mouth daily Last dose 9-6-16    Historical Provider, MD       Allergies: Other and Sucralfate    Social History:   TOBACCO:   reports that she quit smoking about 41 years ago. Her smoking use included cigarettes. She has never used smokeless tobacco.  ETOH:   reports no history of alcohol use.   OCCUPATION: Retired Nurse     Family History:       Problem Relation Age of Onset    Arthritis Mother     Heart Disease Mother     High Blood Pressure Mother     High Cholesterol Mother     Kidney Disease Mother     Stroke Mother     Arthritis Father     Heart Disease Father     Asthma Brother     Arthritis Maternal Aunt     High Blood Pressure Maternal Aunt     High Cholesterol Maternal Aunt     Stroke Maternal Aunt     Asthma Maternal Uncle Birth Defects Maternal Uncle     Cancer Maternal Uncle     Heart Disease Maternal Uncle     High Blood Pressure Maternal Uncle     High Cholesterol Maternal Uncle     Stroke Maternal Uncle     Arthritis Paternal Aunt     Birth Defects Paternal Aunt     Cancer Paternal Aunt     Birth Defects Paternal Uncle     Cancer Paternal Uncle     Arthritis Maternal Grandmother     Kidney Disease Maternal Grandmother     Heart Disease Maternal Grandfather     High Blood Pressure Maternal Grandfather     Arthritis Paternal Grandmother     Heart Disease Paternal Grandmother     Stroke Paternal Grandmother     Cancer Paternal Cousin        REVIEW OF SYSTEMS:  Review of Systems   Constitutional:  Positive for appetite change. Negative for activity change, chills, diaphoresis, fatigue, fever and unexpected weight change. HENT:  Negative for rhinorrhea, sinus pressure, sneezing and sore throat. Respiratory:  Positive for chest tightness and shortness of breath. Negative for cough, choking, wheezing and stridor. Cardiovascular:  Positive for palpitations. Negative for chest pain and leg swelling. Gastrointestinal:  Negative for abdominal pain, blood in stool, constipation, diarrhea, nausea and vomiting. Genitourinary:  Negative for dysuria, enuresis, flank pain and hematuria. Musculoskeletal:  Negative for back pain, joint swelling, myalgias and neck pain. Skin:  Negative for pallor, rash and wound. Neurological:  Positive for headaches. Negative for tremors, speech difficulty, weakness, light-headedness and numbness. Hematological:  Negative for adenopathy. Does not bruise/bleed easily. Psychiatric/Behavioral:  Negative for behavioral problems, confusion, decreased concentration, dysphoric mood, hallucinations and self-injury. The patient is not nervous/anxious.        Physical Exam   Vitals: /81   Pulse 97   Temp (!) 96.4 °F (35.8 °C) (Temporal)   Resp 22   Ht 5' 6\" (1.676 m)   Wt 162 lb (73.5 kg) LMP 01/01/2006   SpO2 90%   BMI 26.15 kg/m²         Physical Exam:  Vitals: /81   Pulse 97   Temp (!) 96.4 °F (35.8 °C) (Temporal)   Resp 22   Ht 5' 6\" (1.676 m)   Wt 162 lb (73.5 kg)   LMP 01/01/2006   SpO2 90%   BMI 26.15 kg/m²     I & O - 24hr: No intake/output data recorded. General Appearance: alert, appears stated age, and cooperative  HEENT:  Head: Normocephalic, no lesions, without obvious abnormality. Neck: no adenopathy, no carotid bruit, no JVD, supple, symmetrical, trachea midline, and thyroid not enlarged, symmetric, no tenderness/mass/nodules  Lung: clear to auscultation bilaterally  Heart: regular rate and rhythm, S1, S2 normal, no murmur, click, rub or gallop  Abdomen: soft, non-tender; bowel sounds normal; no masses,  no organomegaly  Extremities:  extremities normal, atraumatic, no cyanosis or edema  Musculokeletal: No joint swelling, no muscle tenderness. ROM normal in all joints of extremities.    Neurologic: Mental status: Alert, oriented, thought content appropriate      Lines     site day    Art line   None    TLC None    PICC None    Hemoaccess None    Oxygen:    nasal canula at 2L/min    ABG:   No results found for: PHART, PH, [id number], PCO2, PO2ART, PO2, XPX7DVK, HCO3, BEART, BE, THGBART, THB, JRS5KVT, T5EBKKJM, O2SAT  Labs and Imaging Studies   Basic Labs  CBC:   Lab Results   Component Value Date/Time    WBC 11.5 12/04/2022 01:31 AM    RBC 4.87 12/04/2022 01:31 AM    HGB 14.5 12/04/2022 01:31 AM    HCT 44.0 12/04/2022 01:31 AM    MCV 90.3 12/04/2022 01:31 AM    RDW 12.5 12/04/2022 01:31 AM     12/04/2022 01:31 AM     BMP:    Lab Results   Component Value Date/Time     12/03/2022 05:23 PM    K 4.5 12/03/2022 05:23 PM     12/03/2022 05:23 PM    CO2 25 12/03/2022 05:23 PM    BUN 18 12/03/2022 05:23 PM     CMP:  Lab Results   Component Value Date/Time     12/03/2022 05:23 PM    K 4.5 12/03/2022 05:23 PM     12/03/2022 05:23 PM    CO2 25 12/03/2022 05:23 PM    BUN 18 12/03/2022 05:23 PM    PROT 7.3 12/03/2022 05:23 PM     U/A:  No components found for: Justus Kent, USPGRAV, UPH, UPROTEIN, UGLUCOSE, UKETONE, UBILI, UBLOOD, [place], [place], [place], [facility], [place], [facility], [place], [facility], [place]  TSH:    Lab Results   Component Value Date/Time    TSH 2.930 12/03/2022 05:23 PM       Imaging Studies:     XR CHEST PORTABLE    Result Date: 12/3/2022  EXAMINATION: ONE XRAY VIEW OF THE CHEST 12/3/2022 6:16 pm COMPARISON: 01/14/2005 HISTORY: ORDERING SYSTEM PROVIDED HISTORY: tachycardia, sob TECHNOLOGIST PROVIDED HISTORY: Reason for exam:->tachycardia, sob FINDINGS: The lungs are without acute focal process. There is no effusion or pneumothorax. The cardiomediastinal silhouette is without acute process. The osseous structures are without acute process. 3 mm probable calcified granuloma left upper lobe. No acute process. MRI SHOULDER RIGHT WO CONTRAST    Result Date: 11/10/2022  EXAMINATION: MRI OF THE RIGHT SHOULDER WITHOUT CONTRAST   11/8/2022 6:01 pm TECHNIQUE: Multiplanar multisequence MRI of the right shoulder was performed without the administration of intravenous contrast. COMPARISON: Right shoulder x-rays 08/29/2022 and 07/29/2010. HISTORY: ORDERING SYSTEM PROVIDED HISTORY: Acute pain of right shoulder TECHNOLOGIST PROVIDED HISTORY: Reason for exam:->rule out rotator cuff tear FINDINGS: ROTATOR CUFF: Mild irregularity to the bursal surface of the supraspinatus tendon compatible with low-grade partial-thickness bursal-surface tear versus bursal-surface fraying. This is present in the region of the musculotendinous junction. There is also evidence for interstitial tearing of the distal anterior fibers of the supraspinatus tendon at greater tuberosity attachment. Rotator cuff otherwise appears intact without evidence for high-grade partial-thickness bursal undersurface tear or evidence for full-thickness tear.   Mild superimposed tendinosis to the supraspinatus tendon. Muscle mass and muscle signal intensities are maintained. BICEPS TENDON: Long head biceps tendon appears intact, appropriately located and normal in appearance. LABRUM: No labral tear is identified. No paralabral cyst is seen. GLENOHUMERAL JOINT: No degenerative changes noted. No joint effusion or intra-articular loose bodies. AC JOINT AND ACROMIOCLAVICULAR ARCH: Mild-to-moderate degenerative changes to the [facility] joint. Small AC joint effusion. No findings for [facility] joint separation. Mild narrowing of acromiohumeral interval.  No significant fluid in the subacromial/subdeltoid bursa. BONE MARROW: No evidence for occult fracture. No suspicious focal bony lesions. OUTLET SPACES: No focal abnormalities are seen in the outlet spaces. Low-grade partial-thickness bursal-surface tearing versus bursal-surface fraying to the supraspinatus tendon. There is also some interstitial tearing of the supraspinatus tendon as above. Superimposed mild tendinosis. Mild-to-moderate degenerative changes to the [facility] joint along with small AC joint effusion. There is also mild narrowing of acromiohumeral interval. Findings could be contributing to impingement symptoms in the appropriate clinical setting. MRI KNEE RIGHT WO CONTRAST    Result Date: 11/10/2022  EXAMINATION: MRI OF THE RIGHT KNEE WITHOUT CONTRAST, 11/8/2022 6:22 pm TECHNIQUE: Multiplanar multisequence MRI of the right knee was performed without the administration of intravenous contrast. COMPARISON: Right knee x-rays 08/29/2022. HISTORY: ORDERING SYSTEM PROVIDED HISTORY: Acute pain of right knee TECHNOLOGIST PROVIDED HISTORY: Reason for exam:->Rule out meniscus tear FINDINGS: MENISCI: Medial and lateral menisci appear intact and normal in morphology. No parameniscal cysts are seen. CRUCIATE LIGAMENTS: ACL and PCL appear intact and unremarkable. EXTENSOR MECHANISM: Quadriceps and patellar tendons appear intact and unremarkable. Medial and lateral patellar retinacula appear intact. LATERAL COLLATERAL LIGAMENT COMPLEX: Lateral collateral ligament complex appears intact and unremarkable. Popliteus tendon appears intact. MEDIAL COLLATERAL LIGAMENT COMPLEX: Medial collateral ligament appears intact and unremarkable. KNEE JOINT: No knee joint effusion. No intra-articular loose bodies. No significant Baker cyst. Mild degenerative changes to the medial and patellofemoral compartments. No significant degenerative changes to the lateral compartment. BONE MARROW: No evidence for occult fracture. No suspicious focal bony lesions. Mild degree of likely reactive subchondral bone marrow edema associated with degenerative changes in the medial compartment of the knee involving the medial tibial plateau. Mild degenerative changes to the medial and patellofemoral compartments. No meniscus or ligament pathology. CTA PULMONARY W CONTRAST    Result Date: 12/3/2022  EXAMINATION: CTA OF THE CHEST 12/3/2022 6:00 pm TECHNIQUE: CTA of the chest was performed after the administration of intravenous contrast.  Multiplanar reformatted images are provided for review. MIP images are provided for review. Automated exposure control, iterative reconstruction, and/or weight based adjustment of the mA/kV was utilized to reduce the radiation dose to as low as reasonably achievable. COMPARISON: None. HISTORY: ORDERING SYSTEM PROVIDED HISTORY: assess for pe TECHNOLOGIST PROVIDED HISTORY: Reason for exam:->assess for pe Decision Support Exception - unselect if not a suspected or confirmed emergency medical condition->Emergency Medical Condition (MA) FINDINGS: Extensive bilateral pulmonary emboli with filling defects in left upper lobe segmental, left lower lobe segmental, right middle lobe segmental, and right and left lower lobe segmental pulmonary arteries. Partial saddle embolism seen at level of proximal left pulmonary artery.   Right atrium is enlarged as well as enlargement of right ventricle suggesting right heart strain. There is prominence of the pulmonary vasculature. Hazy ground-glass opacities are present bilaterally likely secondary to pulmonary emboli. No airspace consolidation or pleural effusion. No pneumothorax. Incidental calcified granuloma located in peripheral aspect of the lingula. View of the upper abdomen shows a cystic lesion at level of the pancreatic head measuring approximately 1.4 x 1 cm. Additional cystic lesion at level of the pancreatic body measures 1 x 0.9 cm. Nonobstructing 3 mm left renal calculus. Small hiatal hernia. 1. Positive examination for bilateral pulmonary emboli with evidence of right heart strain. 2. Ground-glass opacities bilaterally likely secondary to bilateral pulmonary emboli. 3. View of the upper abdomen shows cystic lesions at level of pancreatic head and pancreatic body measuring up to 1.4 x 1 cm. MRI could be helpful for further evaluation. 4.  Critical results were called by Dr. Yoana Deleon to Dr. Noemi Campbell on 12/3/2022 at 18:27.        Resident's Assessment and Plan     Germain Ochoa is a 58 y.o. female    Pulmonology    Intermediate high risk submassive pulmonary embolism, unprovoked  -Presented to ED with worsening shortness of breath and chest tightness  -CTA chest showed bilateral pulmonary emboli with evidence of right heart strain, extensive bilateral pulmonary emboli with filling defects in left upper lobe segmental, left lower lobe segmental, right middle lobe segmental and right and left lower lobe segmental, right atrium enlarged as well as enlargement of right ventricle  -Troponin increased up to 79, trended down to 66  -proBNP increased up to 594  -PESI score 82 with sPESI>1  -SBP> 100, HR at 140 with normal respiratory rate and saturating greater than 90% on room air  -No history of DVT or malignancy in past  -Heparin drip started in the ED  Plan  -Continue on heparin drip  -Stat echo ordered, follow-up  -Rule out need of tPA after echo  -Work-up for any possible malignancy  -DVT ultrasound   - Work up for hypercoagulable state    Cardiology    Gastroenterology    Cystic lesion in pancreatic head and body, rule out pancreatic benign tumor or carcinoma  -CTA showed cystic lesions at the level of pancreatic head and pancreatic body measuring up to 1.4 into 1 cm  -No history of recent weight loss and anorexia  -History of recurrent GERD  Plan:  -Possible MRI of the abdomen for further evaluation  - Ca 19.9 to rule out possible malignancy  - Heme/Onc consultation if needed    History of GERD  - increased regurgitation recently   - on daily omeprazole at home   - EGD in 2015 showed mod. Diffuse gastritis and small hiatal hernia  - Monitor for any new symptoms    MSK    History of osteoarthritis  - takes on and off tylenol for pain       PT/OT evaluation: Not indicated currently   DVT prophylaxis/ GI prophylaxis: Heparin/Protonix  Disposition: Admit to CARLTON Underwood MD, PGY-1  Attending physician: Dr. Merino Sylvia [facility]  Department of Pulmonary, Critical Care and Sleep Medicine  [address] [facility]  Department of Internal Medicine      During multidisciplinary team rounds Carroll Valentine is a 58 y.o. female was seen, examined and discussed. This is confirmation that I have personally seen and examined the patient and that the key elements of the encounter were performed by me (> 85 % time). The medications & laboratory data was discussed and adjusted where necessary. The radiographic images were reviewed or with radiologist or consultant if felt dis-concordant with the exam or history. The above findings were corroborated, plans confirmed and changes made if needed. Family is updated at the bedside as available. Key issues of the case were discussed among consultants. Critical Care time is documented if appropriate. Case discussed with medical residents throughout the night and this am. I personally reviewed the patients echo and ct of the chest. Continue heparin gtt at this time. Dr. Joby Chaney consulted due to pancreatic imaging. Case also discussed with Dr. Jelly Nelson.    Critical Care time: 45 minutes    Starla Rosen DO

## 2022-12-04 NOTE — ED NOTES
Spoke with access center and they are putting call out to access center     José Manuel Sandoval RN  12/03/22 [id number]

## 2022-12-04 NOTE — CONSULTS
Madhuri Sweeney M.D. Wayne Chavez M.D. Pincus Pancoast, M.D. Richard Terry D.O. Patient:  Brigid Amaya 58 y.o. female MRN: [id number]     Date of Service: 12/4/2022      PULMONARY CONSULTATION    Reason for Consultation: Pulmonary embolism  Referring Physician: No att. providers found    Chief Complaint   Patient presents with    Shortness of Breath     Chest tightness and tightness in upper neck. Irregular heart beat and fast heart rate. Started this am.     Chest Pain         Code Status: Full Code        SUBJECTIVE:    HPI:  This is a pleasant 61-year-old female with past medical history of GERD, osteoarthritis that presents with worsening dyspnea, tachycardia, and palpitations.  at bedside and able to give further history. Patient reports having some dyspnea with exertion over the past couple weeks. She was otherwise doing well and did not think much of it. Yesterday she noticed her heart was racing and she was having significant amount of palpitations. Given her heart racing, palpitations and worsening dyspnea she presented to [facility] emergency department. Work-up there included a CT pulmonary angiogram which showed bilateral pulmonary emboli with possible right heart strain. Upon presentation to the emergency department in [facility] she was tachycardic up to 499 with systolic blood pressure greater than 100. Patient also had elevated proBNP and troponin. She was started on a heparin drip and transferred to [facility]. A stat 2D echo was performed which showed normal RV size and function. There was increased pulmonary pressures. This morning since being on heparin drip over past several hours she reports feeling much better. Her heart rate has since come down to the 80s without much PACs or PVCs. Dyspnea has significantly improved as well. Her blood pressure sustained a systolic greater than 623.   She tells me that her blood pressure normally runs on the lower side. Since being on the heparin drip her troponins and proBNP have also significantly improved. She denies ever having a VTE in the past.  Reports that her grandmother had a DVT, but denies any other family members with thromboembolism. Tells me that she is normally active doing things at home. She does have arthritis in her right knee and right shoulder, but does not limit her activity. She did get injection of her right shoulder done in November. She did have a 6-hour car trip to [place] about 1 month ago. Went on a plane ride in June. CT chest also showed cystic lesions at the level of the pancreatic head and pancreatic body.       Past Medical History:   Diagnosis Date    Abnormal Pap smear of cervix 2011 apx    Arthritis     cervical    Chronic back pain     Depression     GALEANA (dyspnea on exertion)     Gastritis     GERD (gastroesophageal reflux disease)     Hiatal hernia 03/11/2015    Osteoarthritis     Palpitations     Partial tear of rotator cuff     PONV (postoperative nausea and vomiting)     Shingles 2007    Syncope 2012    TMJ (dislocation of temporomandibular joint)      Past Surgical History:   Procedure Laterality Date    [address] LAPAROSCOPIC  2003    COLONOSCOPY  03/11/2015    Dr. Cristopher Kinsey  2011    ENDOSCOPY, COLON, DIAGNOSTIC  2003, 2015 2015 with colonoscopy dr. [facility]    HYSTEROSCOPY  09/08/2016    and D&C     LAPAROSCOPY  1989    ovarian cysts    [facility]  [address] [facility]    x 4     Family History   Problem Relation Age of Onset    Arthritis Mother     Heart Disease Mother     High Blood Pressure Mother     High Cholesterol Mother     Kidney Disease Mother     Stroke Mother     Arthritis Father     Heart Disease Father     Asthma Brother Arthritis Maternal Aunt     High Blood Pressure Maternal Aunt     High Cholesterol Maternal Aunt     Stroke Maternal Aunt     Asthma Maternal Uncle     Birth Defects Maternal Uncle     Cancer Maternal Uncle     Heart Disease Maternal Uncle     High Blood Pressure Maternal Uncle     High Cholesterol Maternal Uncle     Stroke Maternal Uncle     Arthritis Paternal Aunt     Birth Defects Paternal Aunt     Cancer Paternal Aunt     Birth Defects Paternal Uncle     Cancer Paternal Uncle     Arthritis Maternal Grandmother     Kidney Disease Maternal Grandmother     Heart Disease Maternal Grandfather     High Blood Pressure Maternal Grandfather     Arthritis Paternal Grandmother     Heart Disease Paternal Grandmother     Stroke Paternal Grandmother     Cancer Paternal Cousin          Social History:   Social History     Socioeconomic History    Marital status:      Spouse name: Not on file    Number of children: Not on file    Years of education: Not on file    Highest education level: Not on file   Occupational History    Not on file   Tobacco Use    Smoking status: Former     Types: Cigarettes     Quit date: 1981     Years since quittin.2    Smokeless tobacco: Never    Tobacco comments:     only smoked x 1 year in college   Substance and Sexual Activity    Alcohol use: No     Alcohol/week: 0.0 standard drinks     Comment: rarely    Drug use: No    Sexual activity: Yes     Partners: Male     Birth control/protection: Post-menopausal   Other Topics Concern    Not on file   Social History Narrative    Not on file     Social Determinants of Health     Financial Resource Strain: Not on file   Food Insecurity: Not on file   Transportation Needs: Not on file   Physical Activity: Not on file   Stress: Not on file   Social Connections: Not on file   Intimate Partner Violence: Not on file   Housing Stability: Not on file       Social Hx: Retired RN    Vaccines:    Immunization History   Administered Date(s) Administered    COVID-19, MODERNA BLUE border, Primary or Immunocompromised, (age 12y+), IM, 100 mcg/0.5mL 01/08/2021, 02/05/2021    COVID-19, PFIZER Bivalent BOOSTER, (age 12y+), IM, 30 mcg/0.3 mL dose 11/03/2022    COVID-19, PFIZER PURPLE top, DILUTE for use, (age 15 y+), 30mcg/0.3mL 02/02/2022    Influenza Virus Vaccine 10/06/2017, 10/02/2018, 10/23/2019, 10/27/2021    Td, unspecified formulation 08/25/2011        Home Meds: Medications Prior to Admission: omeprazole (PRILOSEC OTC) 20 MG tablet, Take 20 mg by mouth daily OTC  Levocetirizine Dihydrochloride (XYZAL PO), Take by mouth  magnesium 200 MG TABS tablet, Take 200 mg by mouth daily Last dose 9-6-16  b complex vitamins capsule, Take 1 capsule by mouth daily Last dose 9-6-16  Triamcinolone Acetonide (NASACORT ALLERGY 24HR NA), 1 Inhaler by Nasal route Indications: prn   pseudoephedrine (SUDAFED) 30 MG tablet, Take 30 mg by mouth every 4 hours as needed for Congestion (prn)   Calcium Carb-Cholecalciferol (CALCIUM + D3) 600-200 MG-UNIT TABS, Take by mouth  therapeutic multivitamin-minerals (THERAGRAN-M) tablet, Take 1 tablet by mouth daily Last dose 9-6-16    CURRENT MEDS :  Scheduled Meds:   therapeutic multivitamin-minerals  1 tablet Oral Daily    vitamin B and C  1 tablet Oral Daily    sodium chloride flush  5-40 mL IntraVENous 2 times per day    sodium chloride flush  10 mL IntraVENous Once    heparin (porcine)  80 Units/kg IntraVENous Once       Continuous Infusions:   sodium chloride      heparin (PORCINE) Infusion 15 Units/kg/hr (12/04/22 0512)       Allergies   Allergen Reactions    Other Anaphylaxis     Powder in exam gloves    Sucralfate Rash       REVIEW OF SYSTEMS:  REVIEW OF SYMPTOMS:  Review of Systems   Constitutional:  Negative for chills, fatigue and fever. HENT: Negative. Eyes: Negative. Respiratory:  Positive for shortness of breath. Cardiovascular:  Positive for palpitations. Negative for chest pain and leg swelling. Gastrointestinal:  Negative for abdominal pain, nausea and vomiting. Endocrine: Negative for cold intolerance and heat intolerance. Genitourinary:  Negative for difficulty urinating and dysuria. Musculoskeletal: Negative. Skin:  Negative for color change and rash. Allergic/Immunologic: Negative for environmental allergies and immunocompromised state. Neurological:  Positive for light-headedness. Negative for dizziness and weakness. Psychiatric/Behavioral:  Negative for agitation and confusion. OBJECTIVE:   /71   Pulse 87   Temp 98 °F (36.7 °C) (Temporal)   Resp 17   Ht 5' 6\" (1.676 m)   Wt 165 lb 14.4 oz (75.3 kg)   LMP 01/01/2006   SpO2 98%   BMI 26.78 kg/m²   SpO2 Readings from Last 1 Encounters:   12/04/22 98%        I/O:    Intake/Output Summary (Last 24 hours) at 12/4/2022 0907  Last data filed at 12/4/2022 0600  Gross per 24 hour   Intake 123.86 ml   Output --   Net 123.86 ml                      PHYSICAL EXAM:  Physical Exam  Constitutional:       General: She is not in acute distress. HENT:      Head: Normocephalic and atraumatic. Mouth/Throat:      Pharynx: No oropharyngeal exudate. Eyes:      General: No scleral icterus. Conjunctiva/sclera: Conjunctivae normal.   Neck:      Trachea: No tracheal deviation. Cardiovascular:      Rate and Rhythm: Normal rate. Heart sounds: Normal heart sounds. Pulmonary:      Effort: Pulmonary effort is normal.      Breath sounds: Normal breath sounds. Abdominal:      Palpations: Abdomen is soft. Tenderness: There is no abdominal tenderness. Musculoskeletal:         General: No swelling or deformity. Cervical back: Neck supple. Lymphadenopathy:      Cervical: No cervical adenopathy. Skin:     General: Skin is warm. Findings: No rash. Neurological:      General: No focal deficit present. Mental Status: She is alert and oriented to person, place, and time.    Psychiatric:         Behavior: Behavior normal.       Pulmonary Function Testing personally reviewed and interpreted. PERTINENT LAB RESULTS: Labs reviewed. DIAGNOSTICS: Pertinent imaging reviewed. 1. Positive examination for bilateral pulmonary emboli with evidence of right   heart strain. 2. Ground-glass opacities bilaterally likely secondary to bilateral pulmonary   emboli. 3. View of the upper abdomen shows cystic lesions at level of pancreatic head   and pancreatic body measuring up to 1.4 x 1 cm. MRI could be helpful for   further evaluation. ASSESSMENT:     1. Acute multiple bilateral pulmonary emboli. Submassive pulm embolism. Unclear etiology at this time. 2.  Pancreatic lesion    3. GERD    4. Osteoarthritis      PLAN:     Given clinical improvement, improvement in biomarkers as well as normal RV function on 2D echo would continue with heparin gtt at this time. Continue heparin drip for another 24 hours for clot stabilization  Thrombophilia panel has been ordered  B/L lower ext duplex ordered  Consult hepatobiliary for pancreatic lesion    Reviewed with critical care team.  Input appreciated. Pulmonary will continue to follow along with you. Thank you for allowing me to participate in the care of Gurdeep Avendano. Please feel free to call with questions.        Electronically signed by Teodora Goldberg, DO on 12/4/2022 at 9:07 AM

## 2022-12-04 NOTE — CONSULTS
[facility]  CONSULT NOTE  12/4/2022    Physician Consulted: Dr. Anjana Ramirez   Reason for Consult: Pancreatic mass  Referring Physician: Dr. Eduardo DOBSON  Alin Crook is a 58 y.o. female with hx of GERD, hiatal hernia, appendectomy cholecystectomy who presents for evaluation of increasing SOB, she was admitted to MICU for treatment of PE. We are consulted for incidental finding of cystic lesions in the head and tail of the pancreas. Patient states she has had no abdominal pain, no diarrhea, no weight loss, no fevers or chills. She has no family history of cancer. She has not had any change in the colour of her stools, urine, sclera, or skin. She has no prior imaging for comparison. Afebrile, hemodynamically stable on room air. ALT/AST/Bili 18/19/0.6  Lipase 41  Wbc 9   On heparin gtt for PE     CTA with multiple cystic lesions in head and body of pancreas, do not appear to involve the duct.        Past Medical History:   Diagnosis Date    Abnormal Pap smear of cervix 2011 apx    Arthritis     cervical    Chronic back pain     Depression     GALEANA (dyspnea on exertion)     Gastritis     GERD (gastroesophageal reflux disease)     Hiatal hernia 03/11/2015    Osteoarthritis     Palpitations     Partial tear of rotator cuff     PONV (postoperative nausea and vomiting)     Shingles 2007    Syncope 2012    TMJ (dislocation of temporomandibular joint)        Past Surgical History:   Procedure Laterality Date    [address], LAPAROSCOPIC  2003    COLONOSCOPY  03/11/2015    Dr. Blanca Marinelli  2011    ENDOSCOPY, COLON, DIAGNOSTIC  2003, 2015 2015 with colonoscopy dr. Cecelia James    HYSTEROSCOPY  09/08/2016    and D&C     LAPAROSCOPY  1989    ovarian cysts    [facility]  [address] [facility]    x 4       Medications Prior to Admission    Prior to Admission medications    Medication Sig Start Date End Date Taking?  Authorizing Provider   omeprazole (PRILOSEC OTC) 20 MG tablet Take 20 mg by mouth daily OTC   Yes Historical Provider, MD   Levocetirizine Dihydrochloride (XYZAL PO) Take by mouth    Historical Provider, MD   magnesium 200 MG TABS tablet Take 200 mg by mouth daily Last dose 9-6-16    Historical Provider, MD   b complex vitamins capsule Take 1 capsule by mouth daily Last dose 9-6-16    Historical Provider, MD   Triamcinolone Acetonide (NASACORT ALLERGY 24HR NA) 1 Inhaler by Nasal route Indications: prn     Historical Provider, MD   pseudoephedrine (SUDAFED) 30 MG tablet Take 30 mg by mouth every 4 hours as needed for Congestion (prn)     Historical Provider, MD   Calcium Carb-Cholecalciferol (CALCIUM + D3) 600-200 MG-UNIT TABS Take by mouth    Historical Provider, MD   therapeutic multivitamin-minerals (THERAGRAN-M) tablet Take 1 tablet by mouth daily Last dose 9-6-16    Historical Provider, MD       Allergies   Allergen Reactions    Other Anaphylaxis     Powder in exam gloves    Sucralfate Rash       Family History   Problem Relation Age of Onset    Arthritis Mother     Heart Disease Mother     High Blood Pressure Mother     High Cholesterol Mother     Kidney Disease Mother     Stroke Mother     Arthritis Father     Heart Disease Father     Asthma Brother     Arthritis Maternal Aunt     High Blood Pressure Maternal Aunt     High Cholesterol Maternal Aunt     Stroke Maternal Aunt     Asthma Maternal Uncle     Birth Defects Maternal Uncle     Cancer Maternal Uncle     Heart Disease Maternal Uncle     High Blood Pressure Maternal Uncle     High Cholesterol Maternal Uncle     Stroke Maternal Uncle     Arthritis Paternal Aunt     Birth Defects Paternal Aunt     Cancer Paternal Aunt     Birth Defects Paternal Uncle     Cancer Paternal Uncle     Arthritis Maternal Grandmother     Kidney Disease Maternal Grandmother     Heart Disease Maternal Grandfather High Blood Pressure Maternal Grandfather     Arthritis Paternal Grandmother     Heart Disease Paternal Grandmother     Stroke Paternal Grandmother     Cancer Paternal Cousin        Social History     Tobacco Use    Smoking status: Former     Types: Cigarettes     Quit date: 1981     Years since quittin.2    Smokeless tobacco: Never    Tobacco comments:     only smoked x 1 year in college   Substance Use Topics    Alcohol use: No     Alcohol/week: 0.0 standard drinks     Comment: rarely    Drug use: No         Review of Systems: pertinent ROS listed in HPI, all others negative       PHYSICAL EXAM:  BP (!) 110/57   Pulse 84   Temp 99.1 °F (37.3 °C) (Oral)   Resp 20   Ht 5' 6\" (1.676 m)   Wt 156 lb 4.8 oz (70.9 kg)   LMP 2006   SpO2 99%   BMI 25.23 kg/m²     GENERAL:  NAD. A&Ox3. HEAD:  Normocephalic. Atraumatic. EYES:   No scleral icterus. PERRL. LUNGS:  No increased work of breathing. On RA  CARDIOVASCULAR: RR  ABDOMEN:  Soft, non-distended, non-tender. No guarding, rigidity, rebound. EXTREMITIES:   MAEx4. Atraumatic. No LE edema.   SKIN:  Warm and dry  NEUROLOGIC:  GCS 15      ASSESSMENT/PLAN:  58 y.o. 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

## 2022-12-04 NOTE — PROGRESS NOTES
[facility] Quality Flow/Interdisciplinary Rounds Progress Note        Quality Flow Rounds held on December 4, 2022    Disciplines Attending:  Bedside nurse, charge nurse, , PT/OT, pharmacy, nursing unit leadership, , Medical residents     Azam Avelar was admitted on 12/3/2022  3:33 PM    Anticipated Discharge Date:       Disposition:    Efraín Score:  Efraín Scale Score: 20    Readmission Risk              Risk of Unplanned Readmission:  10           Discussed patient goal for the day, patient clinical progression, and barriers to discharge.   The following Goal(s) of the Day/Commitment(s) have been identified: continue ICU care      Dawood Richard RN  December 4, 2022

## 2022-12-05 ENCOUNTER — APPOINTMENT (OUTPATIENT)
Dept: MRI IMAGING | Age: 62
DRG: 176 | End: 2022-12-05
Payer: COMMERCIAL

## 2022-12-05 LAB
ALBUMIN SERPL-MCNC: 3.5 G/DL (ref 3.5–5.2)
ALP BLD-CCNC: 88 U/L (ref 35–104)
ALT SERPL-CCNC: 22 U/L (ref 0–32)
ANION GAP SERPL CALCULATED.3IONS-SCNC: 13 MMOL/L (ref 7–16)
APTT: 31.8 SEC (ref 24.5–35.1)
APTT: 52 SEC (ref 24.5–35.1)
APTT: 57.8 SEC (ref 24.5–35.1)
APTT: 58.1 SEC (ref 24.5–35.1)
AST SERPL-CCNC: 21 U/L (ref 0–31)
BASOPHILS ABSOLUTE: 0.03 E9/L (ref 0–0.2)
BASOPHILS RELATIVE PERCENT: 0.3 % (ref 0–2)
BILIRUB SERPL-MCNC: 0.4 MG/DL (ref 0–1.2)
BUN BLDV-MCNC: 16 MG/DL (ref 6–23)
CALCIUM SERPL-MCNC: 8.8 MG/DL (ref 8.6–10.2)
CHLORIDE BLD-SCNC: 107 MMOL/L (ref 98–107)
CO2: 20 MMOL/L (ref 22–29)
CREAT SERPL-MCNC: 1 MG/DL (ref 0.5–1)
EKG ATRIAL RATE: 89 BPM
EKG P AXIS: 75 DEGREES
EKG P-R INTERVAL: 120 MS
EKG Q-T INTERVAL: 368 MS
EKG QRS DURATION: 70 MS
EKG QTC CALCULATION (BAZETT): 447 MS
EKG R AXIS: 82 DEGREES
EKG T AXIS: 78 DEGREES
EKG VENTRICULAR RATE: 89 BPM
EOSINOPHILS ABSOLUTE: 0.23 E9/L (ref 0.05–0.5)
EOSINOPHILS RELATIVE PERCENT: 2.5 % (ref 0–6)
GFR SERPL CREATININE-BSD FRML MDRD: >60 ML/MIN/1.73
GLUCOSE BLD-MCNC: 130 MG/DL (ref 74–99)
HCT VFR BLD CALC: 42.1 % (ref 34–48)
HEMOGLOBIN: 14 G/DL (ref 11.5–15.5)
IMMATURE GRANULOCYTES #: 0.02 E9/L
IMMATURE GRANULOCYTES %: 0.2 % (ref 0–5)
LYMPHOCYTES ABSOLUTE: 2.19 E9/L (ref 1.5–4)
LYMPHOCYTES RELATIVE PERCENT: 23.9 % (ref 20–42)
MAGNESIUM: 2.4 MG/DL (ref 1.6–2.6)
MCH RBC QN AUTO: 29.9 PG (ref 26–35)
MCHC RBC AUTO-ENTMCNC: 33.3 % (ref 32–34.5)
MCV RBC AUTO: 90 FL (ref 80–99.9)
MONOCYTES ABSOLUTE: 0.6 E9/L (ref 0.1–0.95)
MONOCYTES RELATIVE PERCENT: 6.5 % (ref 2–12)
NEUTROPHILS ABSOLUTE: 6.11 E9/L (ref 1.8–7.3)
NEUTROPHILS RELATIVE PERCENT: 66.6 % (ref 43–80)
PDW BLD-RTO: 12.4 FL (ref 11.5–15)
PHOSPHORUS: 3.3 MG/DL (ref 2.5–4.5)
PLATELET # BLD: 190 E9/L (ref 130–450)
PMV BLD AUTO: 9.6 FL (ref 7–12)
POTASSIUM SERPL-SCNC: 3.8 MMOL/L (ref 3.5–5)
PRO-BNP: 830 PG/ML (ref 0–125)
RBC # BLD: 4.68 E12/L (ref 3.5–5.5)
SODIUM BLD-SCNC: 140 MMOL/L (ref 132–146)
TOTAL PROTEIN: 6.3 G/DL (ref 6.4–8.3)
WBC # BLD: 9.2 E9/L (ref 4.5–11.5)

## 2022-12-05 PROCEDURE — 6360000004 HC RX CONTRAST MEDICATION: Performed by: RADIOLOGY

## 2022-12-05 PROCEDURE — 93010 ELECTROCARDIOGRAM REPORT: CPT | Performed by: INTERNAL MEDICINE

## 2022-12-05 PROCEDURE — 85730 THROMBOPLASTIN TIME PARTIAL: CPT

## 2022-12-05 PROCEDURE — 6370000000 HC RX 637 (ALT 250 FOR IP)

## 2022-12-05 PROCEDURE — 6370000000 HC RX 637 (ALT 250 FOR IP): Performed by: INTERNAL MEDICINE

## 2022-12-05 PROCEDURE — 36415 COLL VENOUS BLD VENIPUNCTURE: CPT

## 2022-12-05 PROCEDURE — 2140000000 HC CCU INTERMEDIATE R&B

## 2022-12-05 PROCEDURE — 6360000002 HC RX W HCPCS: Performed by: INTERNAL MEDICINE

## 2022-12-05 PROCEDURE — 83880 ASSAY OF NATRIURETIC PEPTIDE: CPT

## 2022-12-05 PROCEDURE — 83735 ASSAY OF MAGNESIUM: CPT

## 2022-12-05 PROCEDURE — 2700000000 HC OXYGEN THERAPY PER DAY

## 2022-12-05 PROCEDURE — 2580000003 HC RX 258: Performed by: INTERNAL MEDICINE

## 2022-12-05 PROCEDURE — 85025 COMPLETE CBC W/AUTO DIFF WBC: CPT

## 2022-12-05 PROCEDURE — 80053 COMPREHEN METABOLIC PANEL: CPT

## 2022-12-05 PROCEDURE — 84100 ASSAY OF PHOSPHORUS: CPT

## 2022-12-05 PROCEDURE — A9579 GAD-BASE MR CONTRAST NOS,1ML: HCPCS | Performed by: RADIOLOGY

## 2022-12-05 PROCEDURE — 74183 MRI ABD W/O CNTR FLWD CNTR: CPT

## 2022-12-05 RX ADMIN — Medication 1 TABLET: at 10:34

## 2022-12-05 RX ADMIN — PANTOPRAZOLE SODIUM 40 MG: 40 TABLET, DELAYED RELEASE ORAL at 06:40

## 2022-12-05 RX ADMIN — SODIUM CHLORIDE, PRESERVATIVE FREE 10 ML: 5 INJECTION INTRAVENOUS at 10:36

## 2022-12-05 RX ADMIN — HEPARIN SODIUM 2940 UNITS: 1000 INJECTION INTRAVENOUS; SUBCUTANEOUS at 20:08

## 2022-12-05 RX ADMIN — SODIUM CHLORIDE, PRESERVATIVE FREE 10 ML: 5 INJECTION INTRAVENOUS at 20:08

## 2022-12-05 RX ADMIN — GADOTERIDOL 15 ML: 279.3 INJECTION, SOLUTION INTRAVENOUS at 13:58

## 2022-12-05 RX ADMIN — MAGNESIUM HYDROXIDE/ALUMINUM HYDROXICE/SIMETHICONE 30 ML: 120; 1200; 1200 SUSPENSION ORAL at 10:34

## 2022-12-05 RX ADMIN — MAGNESIUM HYDROXIDE/ALUMINUM HYDROXICE/SIMETHICONE 30 ML: 120; 1200; 1200 SUSPENSION ORAL at 17:29

## 2022-12-05 NOTE — PROGRESS NOTES
[facility] Medicine    Subjective:  pt alert conversive denies sob at rest      Current Facility-Administered Medications:     therapeutic multivitamin-minerals 1 tablet, 1 tablet, Oral, Daily, Ashley L De Dios, DO    vitamin B and C (TOTAL B-C) 1 tablet, 1 tablet, Oral, Daily, Ashley L De Dios, DO    sodium chloride flush 0.9 % injection 5-40 mL, 5-40 mL, IntraVENous, 2 times per day, Ashley L De Dios, DO, 10 mL at 12/04/22 2158    sodium chloride flush 0.9 % injection 5-40 mL, 5-40 mL, IntraVENous, PRN, Ashley L De Dios, DO    0.9 % sodium chloride infusion, , IntraVENous, PRN, Ashley L De Dios, DO    ondansetron (ZOFRAN-ODT) disintegrating tablet 4 mg, 4 mg, Oral, Q8H PRN **OR** ondansetron (ZOFRAN) injection 4 mg, 4 mg, IntraVENous, Q6H PRN, Ashley L De Dios, DO    polyethylene glycol (GLYCOLAX) packet 17 g, 17 g, Oral, Daily PRN, Ashley L De Dios, DO    acetaminophen (TYLENOL) tablet 650 mg, 650 mg, Oral, Q6H PRN, 650 mg at 12/04/22 2317 **OR** acetaminophen (TYLENOL) suppository 650 mg, 650 mg, Rectal, Q6H PRN, Ashley L De Dios, DO    perflutren lipid microspheres (DEFINITY) injection 1.5 mL, 1.5 mL, IntraVENous, ONCE PRN, Ashley L De Dios, DO    perflutren lipid microspheres (DEFINITY) injection 1.5 mL, 1.5 mL, IntraVENous, ONCE PRN, Ashley L De Dios, DO    pantoprazole (PROTONIX) tablet 40 mg, 40 mg, Oral, QAM AC, Ashley L De Dios, DO, 40 mg at 12/05/22 0640    aluminum & magnesium hydroxide-simethicone (MAALOX) 200-200-20 MG/5ML suspension 30 mL, 30 mL, Oral, TID AKANKSHA, Floyd Wu MD, 30 mL at 12/04/22 2157    sodium chloride flush 0.9 % injection 10 mL, 10 mL, IntraVENous, Once, Ashley De Dios,     heparin (porcine) injection 5,880 Units, 80 Units/kg, IntraVENous, PRN, Jaret Lara DO, 5,880 Units at 12/03/22 1915    heparin (porcine) injection 2,940 Units, 40 Units/kg, IntraVENous, PRN, Ashley Huffmans,     heparin 25,000 units in dextrose 5% 250 mL (premix) infusion, 5-30 Units/kg/hr, IntraVENous, Continuous, Ashley De Dios DO, Last Rate: 7.4 mL/hr at 12/04/22 2036, 10 Units/kg/hr at 12/04/22 2036    heparin (porcine) injection 5,880 Units, 80 Units/kg, IntraVENous, Once, Ashley De Dios DO    Objective:    BP (!) 110/57   Pulse 84   Temp 99.1 °F (37.3 °C) (Oral)   Resp 20   Ht 5' 6\" (1.676 m)   Wt 156 lb 4.8 oz (70.9 kg)   LMP 01/01/2006   SpO2 99%   BMI 25.23 kg/m²     Heart:  reg  Lungs:  ctab  Abd: + bs soft nontender  Extrem:  w/o edema    CBC with Differential:    Lab Results   Component Value Date/Time    WBC 9.2 12/05/2022 01:00 AM    RBC 4.68 12/05/2022 01:00 AM    HGB 14.0 12/05/2022 01:00 AM    HCT 42.1 12/05/2022 01:00 AM     12/05/2022 01:00 AM    MCV 90.0 12/05/2022 01:00 AM    MCH 29.9 12/05/2022 01:00 AM    MCHC 33.3 12/05/2022 01:00 AM    RDW 12.4 12/05/2022 01:00 AM    LYMPHOPCT 23.9 12/05/2022 01:00 AM    MONOPCT 6.5 12/05/2022 01:00 AM    BASOPCT 0.3 12/05/2022 01:00 AM    MONOSABS 0.60 12/05/2022 01:00 AM    LYMPHSABS 2.19 12/05/2022 01:00 AM    EOSABS 0.23 12/05/2022 01:00 AM    BASOSABS 0.03 12/05/2022 01:00 AM     CMP:    Lab Results   Component Value Date/Time     12/05/2022 01:00 AM    K 3.8 12/05/2022 01:00 AM     12/05/2022 01:00 AM    CO2 20 12/05/2022 01:00 AM    BUN 16 12/05/2022 01:00 AM    CREATININE 1.0 12/05/2022 01:00 AM    GFRAA >60 09/15/2022 09:32 AM    LABGLOM >60 12/05/2022 01:00 AM    GLUCOSE 130 12/05/2022 01:00 AM    PROT 6.3 12/05/2022 01:00 AM    LABALBU 3.5 12/05/2022 01:00 AM    CALCIUM 8.8 12/05/2022 01:00 AM    BILITOT 0.4 12/05/2022 01:00 AM    ALKPHOS 88 12/05/2022 01:00 AM    AST 21 12/05/2022 01:00 AM    ALT 22 12/05/2022 01:00 AM     Warfarin PT/INR:    Lab Results   Component Value Date    INR 1.2 12/04/2022    PROTIME 13.0 (H) 12/04/2022       Assessment:    Principal Problem:    Bilateral pulmonary embolism ([address]  Resolved Problems:    * No resolved hospital problems.  *      Plan:  Cont anticoagulation / us legs mri abd        Alissa Dorantes,   7:09 AM  12/5/2022

## 2022-12-05 NOTE — PLAN OF CARE
Problem: Safety - Adult  Goal: Free from fall injury  12/5/2022 0110 by Trent Gillespie RN  Outcome: Progressing     Problem: ABCDS Injury Assessment  Goal: Absence of physical injury  Outcome: Progressing

## 2022-12-05 NOTE — PROGRESS NOTES
Deana Aguilera M.D.,[facility]  Betito Vega D.O., F.A.C.O.I., Jose R Saldivar M.D. Charo Jose M.D. Hannah Bhatia D.O. Daily Pulmonary Progress Note    Patient:  Carroll Valentine 58 y.o. female MRN: [id number]     Date of Service: 12/5/2022      Synopsis     We are following patient for pulmonary embolism    \"CC\" shortness of breath, chest pain    Code status: Full      Subjective      Patient was seen and examined laying in bed on 2 L nasal cannula. She appears to be comfortable. Remains on heparin drip, possibly switch to Eliquis tomorrow. She still reports some tightness upon deep inspiration. Review of Systems:  Constitutional: Denies fever, weight loss, night sweats. Skin: Denies pigmentation, dark lesions, and rashes   HEENT: Denies hearing loss, tinnitus, ear drainage, epistaxis, sore throat, and hoarseness. Cardiovascular: Denies palpitations, chest pain, and chest pressure.   Respiratory: Chest tightness with deep inspiration  Gastrointestinal: Denies nausea, vomiting, poor appetite, diarrhea, heartburn or reflux  Genitourinary: Denies dysuria, frequency, urgency or hematuria  Musculoskeletal: Denies myalgias, muscle weakness, and bone pain  Neurological: Denies dizziness, vertigo, headache, and focal weakness  Psychological: Denies anxiety and depression  Endocrine: Denies heat intolerance and cold intolerance  Hematopoietic/Lymphatic: Denies bleeding problems and blood transfusions    24-hour events:  Oncology consult noted    Objective   Vitals: BP (!) 96/54   Pulse (!) 7   Temp 97 °F (36.1 °C) (Temporal)   Resp 16   Ht 5' 6\" (1.676 m)   Wt 156 lb 4.8 oz (70.9 kg)   LMP 01/01/2006   SpO2 99%   BMI 25.23 kg/m²     I/O:    Intake/Output Summary (Last 24 hours) at 12/5/2022 1515  Last data filed at 12/5/2022 1424  Gross per 24 hour   Intake 0 ml   Output 1575 ml   Net -1575 ml                        CURRENT MEDS :  Scheduled Meds:   therapeutic multivitamin-minerals  1 tablet Oral Daily    vitamin B and C  1 tablet Oral Daily    sodium chloride flush  5-40 mL IntraVENous 2 times per day    pantoprazole  40 mg Oral QAM AC    aluminum & magnesium hydroxide-simethicone  30 mL Oral TID WC    sodium chloride flush  10 mL IntraVENous Once    heparin (porcine)  80 Units/kg IntraVENous Once       Physical Exam:  General Appearance: appears comfortable in no acute distress. On 2 L nasal cannula  HEENT: Normocephalic atraumatic without obvious abnormality   Neck: Lips, mucosa, and tongue normal.  Supple, symmetrical, trachea midline, no adenopathy;thyroid:  no enlargement/tenderness/nodules or JVD. Lung: Breath sounds CTA. Respirations   unlabored. Symmetrical expansion. Heart: RRR, normal S1, S2. No MRG  Abdomen: Soft, NT, ND. BS present x 4 quadrants. No bruit or organomegaly. Extremities: Pedal pulses 2+ symmetric b/l. Extremities normal, no cyanosis, clubbing, or edema. Musculokeletal: No joint swelling, no muscle tenderness. ROM normal in all joints of extremities. Neurologic: Mental status: Alert and Oriented X3 . Pertinent/ New Labs and Imaging Studies     Imaging Personally Reviewed:  Chest x-ray 12/3/2022:    FINDINGS:   The lungs are without acute focal process. There is no effusion or   pneumothorax. The cardiomediastinal silhouette is without acute process. The   osseous structures are without acute process. 3 mm probable calcified   granuloma left upper lobe. Impression   No acute process. Pulmonary CTA 12/3/2022:    Impression   1. Positive examination for bilateral pulmonary emboli with evidence of right   heart strain. 2. Ground-glass opacities bilaterally likely secondary to bilateral pulmonary   emboli. 3. View of the upper abdomen shows cystic lesions at level of pancreatic head   and pancreatic body measuring up to 1.4 x 1 cm. MRI could be helpful for   further evaluation.            ECHO 12/4/2022:  Summary Left ventricular internal dimensions were normal in diastole and systole. No regional wall motion abnormalities seen. Normal left ventricular ejection fraction. Right ventricular septum flattened in systole consistent with RV pressure   overload. Moderate tricuspid regurgitation. Pulmonary hypertension is moderate . Labs:  Lab Results   Component Value Date/Time    WBC 9.2 12/05/2022 01:00 AM    HGB 14.0 12/05/2022 01:00 AM    HCT 42.1 12/05/2022 01:00 AM    MCV 90.0 12/05/2022 01:00 AM    MCH 29.9 12/05/2022 01:00 AM    MCHC 33.3 12/05/2022 01:00 AM    RDW 12.4 12/05/2022 01:00 AM     12/05/2022 01:00 AM    MPV 9.6 12/05/2022 01:00 AM     Lab Results   Component Value Date/Time     12/05/2022 01:00 AM    K 3.8 12/05/2022 01:00 AM     12/05/2022 01:00 AM    CO2 20 12/05/2022 01:00 AM    BUN 16 12/05/2022 01:00 AM    CREATININE 1.0 12/05/2022 01:00 AM    LABALBU 3.5 12/05/2022 01:00 AM    CALCIUM 8.8 12/05/2022 01:00 AM    GFRAA >60 09/15/2022 09:32 AM    LABGLOM >60 12/05/2022 01:00 AM     Lab Results   Component Value Date/Time    PROTIME 13.0 12/04/2022 11:01 AM    INR 1.2 12/04/2022 11:01 AM     Recent Labs     12/05/22  0100   PROBNP 830*     No results for input(s): PROCAL in the last 72 hours. This SmartLink has not been configured with any valid records. Micro:  No results for input(s): CULTRESP in the last 72 hours. No results for input(s): LABGRAM in the last 72 hours. No results for input(s): LEGUR in the last 72 hours. No results for input(s): STREPNEUMAGU in the last 72 hours. No results for input(s): LP1UAG in the last 72 hours.        Assessment:    Acute multiple bilateral pulmonary emboli-submassive pulmonary embolism-possibly unprovoked  Cystic pancreatic lesions-status post MRI 12/5/2022  GERD  Osteoarthritis      Plan:   Wean supplemental oxygen to maintain SPO2 greater than 92%  Echo reviewed-continue heparin drip at this time  Possibly change heparin to p.o. Eliquis tomorrow-will require a minimum of 6 months of anticoagulation  Thrombophilia panel pending  Follow MRCP-pending--hepatobiliary following  Follow ultrasound of the bilateral lower extremities-pending      This plan of care was reviewed in collaboration with Dr. Julius Jasmine  Electronically signed by RUKHSANA Isabel CNP on 12/5/2022 at 3:15 PM    Addendum:  Reviewed CT scan with Magui Ascencio and her . MRI is ordered will follow results. Follow LE dopplers. Recommend to consult heme/onc since this is unprovoked. I personally saw, examined, and cared for the patient. Labs, medications, radiographs reviewed. I agree with history exam and plans detailed in NP note.    Pretty Owusu MD

## 2022-12-05 NOTE — CARE COORDINATION
Attempted to meet with pt who was off the unit, spouse in room,spoke with him. Pt and spouse live in a ranch home with 3 steps to enter. Pt is independent, active , and no DME at home. Dr. Ivonne Smart is PCP and uses [facility] [address] for [facility]. Spouse will transport home. Pt is off the unit for MRI, heparin drip, and needs an ECHO. Will follow for any discharge needs. Ramya Sanchez, MSW, LSW

## 2022-12-05 NOTE — ACP (ADVANCE CARE PLANNING)
.Advance Care Planning   Healthcare Decision Maker:    Primary Decision Maker: Jamee Osborne  [phone]    Click here to complete Healthcare Decision Makers including selection of the Healthcare Decision Maker Relationship (ie \"Primary\").

## 2022-12-05 NOTE — PROGRESS NOTES
MRI screening completed with patient answering the questions.  Will notifiy MRI department via telephone no

## 2022-12-05 NOTE — PROGRESS NOTES
HBP and Pancreatic Surgery  DAILY PROGRESS NOTE  12/5/2022    CHIEF COMPLAINT:  Chief Complaint   Patient presents with    Shortness of Breath     Chest tightness and tightness in upper neck. Irregular heart beat and fast heart rate. Started this am.     Chest Pain       SUBJECTIVE:  Patient appeared comfortable this morning. Overall states that he felt fine. No acute events overnight, no nausea or emesis. Denies any pain. OBJECTIVE:  BP (!) 110/57   Pulse 84   Temp 99.1 °F (37.3 °C) (Oral)   Resp 20   Ht 5' 6\" (1.676 m)   Wt 165 lb 14.4 oz (75.3 kg)   LMP 01/01/2006   SpO2 99%   BMI 26.78 kg/m²     GENERAL:  NAD. A&Ox3. LUNGS:  No increased work of breathing on 2 L nasal cannula  CARDIOVASCULAR: RR  ABDOMEN:  Soft, non-distended, non-tender. No guarding, rigidity, rebound. EXTREMITIES: No pitting lower extremity edema    ASSESSMENT/PLAN:  58 y.o. female admitted for PE and found to have incidental pancreatic cystic lesions. Plan:  - Plan for MRI today to further characterize lesions  - She will need to be n.p.o. for the MRI.   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

## 2022-12-06 ENCOUNTER — APPOINTMENT (OUTPATIENT)
Dept: ULTRASOUND IMAGING | Age: 62
DRG: 176 | End: 2022-12-06
Payer: COMMERCIAL

## 2022-12-06 PROBLEM — I26.92 ACUTE SADDLE PULMONARY EMBOLISM (HCC): Status: ACTIVE | Noted: 2022-12-04

## 2022-12-06 PROBLEM — K86.2 PANCREATIC CYST: Status: ACTIVE | Noted: 2022-12-06

## 2022-12-06 LAB
ALBUMIN SERPL-MCNC: 3.6 G/DL (ref 3.5–5.2)
ALP BLD-CCNC: 86 U/L (ref 35–104)
ALT SERPL-CCNC: 21 U/L (ref 0–32)
ANION GAP SERPL CALCULATED.3IONS-SCNC: 11 MMOL/L (ref 7–16)
APTT: 115.7 SEC (ref 24.5–35.1)
APTT: 39.5 SEC (ref 24.5–35.1)
AST SERPL-CCNC: 21 U/L (ref 0–31)
BASOPHILS ABSOLUTE: 0.03 E9/L (ref 0–0.2)
BASOPHILS RELATIVE PERCENT: 0.4 % (ref 0–2)
BILIRUB SERPL-MCNC: 0.3 MG/DL (ref 0–1.2)
BUN BLDV-MCNC: 15 MG/DL (ref 6–23)
CALCIUM SERPL-MCNC: 9 MG/DL (ref 8.6–10.2)
CHLORIDE BLD-SCNC: 106 MMOL/L (ref 98–107)
CO2: 25 MMOL/L (ref 22–29)
CREAT SERPL-MCNC: 1.1 MG/DL (ref 0.5–1)
EOSINOPHILS ABSOLUTE: 0.33 E9/L (ref 0.05–0.5)
EOSINOPHILS RELATIVE PERCENT: 4 % (ref 0–6)
GFR SERPL CREATININE-BSD FRML MDRD: 57 ML/MIN/1.73
GLUCOSE BLD-MCNC: 107 MG/DL (ref 74–99)
HCT VFR BLD CALC: 41.2 % (ref 34–48)
HEMOGLOBIN: 13.8 G/DL (ref 11.5–15.5)
IMMATURE GRANULOCYTES #: 0.03 E9/L
IMMATURE GRANULOCYTES %: 0.4 % (ref 0–5)
LYMPHOCYTES ABSOLUTE: 2.47 E9/L (ref 1.5–4)
LYMPHOCYTES RELATIVE PERCENT: 30.3 % (ref 20–42)
MAGNESIUM: 2.3 MG/DL (ref 1.6–2.6)
MCH RBC QN AUTO: 30.3 PG (ref 26–35)
MCHC RBC AUTO-ENTMCNC: 33.5 % (ref 32–34.5)
MCV RBC AUTO: 90.5 FL (ref 80–99.9)
MONOCYTES ABSOLUTE: 0.54 E9/L (ref 0.1–0.95)
MONOCYTES RELATIVE PERCENT: 6.6 % (ref 2–12)
NEUTROPHILS ABSOLUTE: 4.76 E9/L (ref 1.8–7.3)
NEUTROPHILS RELATIVE PERCENT: 58.3 % (ref 43–80)
PDW BLD-RTO: 12.2 FL (ref 11.5–15)
PHOSPHORUS: 4 MG/DL (ref 2.5–4.5)
PLATELET # BLD: 182 E9/L (ref 130–450)
PMV BLD AUTO: 9.3 FL (ref 7–12)
POTASSIUM SERPL-SCNC: 4.2 MMOL/L (ref 3.5–5)
PRO-BNP: 152 PG/ML (ref 0–125)
RBC # BLD: 4.55 E12/L (ref 3.5–5.5)
SODIUM BLD-SCNC: 142 MMOL/L (ref 132–146)
TOTAL PROTEIN: 6.5 G/DL (ref 6.4–8.3)
WBC # BLD: 8.2 E9/L (ref 4.5–11.5)

## 2022-12-06 PROCEDURE — 83735 ASSAY OF MAGNESIUM: CPT

## 2022-12-06 PROCEDURE — 2580000003 HC RX 258: Performed by: INTERNAL MEDICINE

## 2022-12-06 PROCEDURE — 84100 ASSAY OF PHOSPHORUS: CPT

## 2022-12-06 PROCEDURE — 6360000002 HC RX W HCPCS: Performed by: INTERNAL MEDICINE

## 2022-12-06 PROCEDURE — 99222 1ST HOSP IP/OBS MODERATE 55: CPT | Performed by: INTERNAL MEDICINE

## 2022-12-06 PROCEDURE — 80053 COMPREHEN METABOLIC PANEL: CPT

## 2022-12-06 PROCEDURE — 93308 TTE F-UP OR LMTD: CPT

## 2022-12-06 PROCEDURE — 6370000000 HC RX 637 (ALT 250 FOR IP): Performed by: INTERNAL MEDICINE

## 2022-12-06 PROCEDURE — 85025 COMPLETE CBC W/AUTO DIFF WBC: CPT

## 2022-12-06 PROCEDURE — 36415 COLL VENOUS BLD VENIPUNCTURE: CPT

## 2022-12-06 PROCEDURE — 6370000000 HC RX 637 (ALT 250 FOR IP)

## 2022-12-06 PROCEDURE — 2700000000 HC OXYGEN THERAPY PER DAY

## 2022-12-06 PROCEDURE — 2140000000 HC CCU INTERMEDIATE R&B

## 2022-12-06 PROCEDURE — 99232 SBSQ HOSP IP/OBS MODERATE 35: CPT | Performed by: TRANSPLANT SURGERY

## 2022-12-06 PROCEDURE — 93971 EXTREMITY STUDY: CPT | Performed by: RADIOLOGY

## 2022-12-06 PROCEDURE — 93970 EXTREMITY STUDY: CPT

## 2022-12-06 PROCEDURE — 83880 ASSAY OF NATRIURETIC PEPTIDE: CPT

## 2022-12-06 PROCEDURE — 85730 THROMBOPLASTIN TIME PARTIAL: CPT

## 2022-12-06 RX ORDER — FAMOTIDINE 20 MG/1
20 TABLET, FILM COATED ORAL 2 TIMES DAILY PRN
Status: DISCONTINUED | OUTPATIENT
Start: 2022-12-06 | End: 2022-12-07 | Stop reason: HOSPADM

## 2022-12-06 RX ADMIN — MAGNESIUM HYDROXIDE/ALUMINUM HYDROXICE/SIMETHICONE 30 ML: 120; 1200; 1200 SUSPENSION ORAL at 12:22

## 2022-12-06 RX ADMIN — SODIUM CHLORIDE, PRESERVATIVE FREE 10 ML: 5 INJECTION INTRAVENOUS at 20:43

## 2022-12-06 RX ADMIN — ACETAMINOPHEN 650 MG: 325 TABLET ORAL at 11:30

## 2022-12-06 RX ADMIN — HEPARIN SODIUM 9 UNITS/KG/HR: 10000 INJECTION, SOLUTION INTRAVENOUS at 07:13

## 2022-12-06 RX ADMIN — APIXABAN 10 MG: 5 TABLET, FILM COATED ORAL at 20:42

## 2022-12-06 RX ADMIN — Medication 1 TABLET: at 12:21

## 2022-12-06 RX ADMIN — MAGNESIUM HYDROXIDE/ALUMINUM HYDROXICE/SIMETHICONE 30 ML: 120; 1200; 1200 SUSPENSION ORAL at 18:57

## 2022-12-06 RX ADMIN — PANTOPRAZOLE SODIUM 40 MG: 40 TABLET, DELAYED RELEASE ORAL at 06:15

## 2022-12-06 RX ADMIN — HEPARIN SODIUM 2940 UNITS: 1000 INJECTION INTRAVENOUS; SUBCUTANEOUS at 15:12

## 2022-12-06 RX ADMIN — SODIUM CHLORIDE, PRESERVATIVE FREE 10 ML: 5 INJECTION INTRAVENOUS at 12:23

## 2022-12-06 ASSESSMENT — PAIN DESCRIPTION - LOCATION: LOCATION: HEAD

## 2022-12-06 ASSESSMENT — PAIN SCALES - GENERAL
PAINLEVEL_OUTOF10: 0
PAINLEVEL_OUTOF10: 0
PAINLEVEL_OUTOF10: 4

## 2022-12-06 ASSESSMENT — PAIN DESCRIPTION - DESCRIPTORS: DESCRIPTORS: DISCOMFORT;DULL;SORE

## 2022-12-06 ASSESSMENT — PAIN DESCRIPTION - ORIENTATION: ORIENTATION: MID

## 2022-12-06 NOTE — PROGRESS NOTES
[facility] Medicine    Subjective:  pt alert conversive denies sob      Current Facility-Administered Medications:     therapeutic multivitamin-minerals 1 tablet, 1 tablet, Oral, Daily, Ashley L De Dios, DO, 1 tablet at 12/05/22 1034    vitamin B and C (TOTAL B-C) 1 tablet, 1 tablet, Oral, Daily, Ashley L De Dios, DO, 1 tablet at 12/05/22 1034    sodium chloride flush 0.9 % injection 5-40 mL, 5-40 mL, IntraVENous, 2 times per day, Ashley L De Dios, DO, 10 mL at 12/05/22 2008    sodium chloride flush 0.9 % injection 5-40 mL, 5-40 mL, IntraVENous, PRN, Ashley L De Dios, DO    0.9 % sodium chloride infusion, , IntraVENous, PRN, Ashley L De Dios, DO    ondansetron (ZOFRAN-ODT) disintegrating tablet 4 mg, 4 mg, Oral, Q8H PRN **OR** ondansetron (ZOFRAN) injection 4 mg, 4 mg, IntraVENous, Q6H PRN, Ashley L De Dios, DO    polyethylene glycol (GLYCOLAX) packet 17 g, 17 g, Oral, Daily PRN, Ashley L De Dios, DO    acetaminophen (TYLENOL) tablet 650 mg, 650 mg, Oral, Q6H PRN, 650 mg at 12/04/22 2317 **OR** acetaminophen (TYLENOL) suppository 650 mg, 650 mg, Rectal, Q6H PRN, Ashley L De Dios, DO    perflutren lipid microspheres (DEFINITY) injection 1.5 mL, 1.5 mL, IntraVENous, ONCE PRN, Ashley L De Dios, DO    perflutren lipid microspheres (DEFINITY) injection 1.5 mL, 1.5 mL, IntraVENous, ONCE PRN, Ashley L De Dios, DO    pantoprazole (PROTONIX) tablet 40 mg, 40 mg, Oral, QAM AC, Ashley L De Dios, DO, 40 mg at 12/06/22 0615    aluminum & magnesium hydroxide-simethicone (MAALOX) 200-200-20 MG/5ML suspension 30 mL, 30 mL, Oral, TID WC, Ricarda Barbosa MD, 30 mL at 12/05/22 1729    sodium chloride flush 0.9 % injection 10 mL, 10 mL, IntraVENous, Once, Ashley De Dios DO    heparin (porcine) injection 5,880 Units, 80 Units/kg, IntraVENous, PRN, Natalie SosaoDO, 5,880 Units at 12/03/22 1915    heparin (porcine) injection 2,940 Units, 40 Units/kg, IntraVENous, PRN, Jupiter Medico DO, 2,940 Units at 12/05/22 2008    heparin 25,000 units in dextrose 5% 250 mL (premix) infusion, 5-30 Units/kg/hr, IntraVENous, Continuous, Ashley De Dios DO, Last Rate: 6.6 mL/hr at 12/06/22 0713, 9 Units/kg/hr at 12/06/22 0713    heparin (porcine) injection 5,880 Units, 80 Units/kg, IntraVENous, Once, Ashley De Dios DO    Objective:    BP (!) 97/47   Pulse 66   Temp 98 °F (36.7 °C) (Temporal)   Resp 16   Ht 5' 6\" (1.676 m)   Wt 156 lb 8 oz (71 kg)   LMP 01/01/2006   SpO2 97%   BMI 25.07 kg/m²     Heart:  reg  Lungs:  ctab  Abd: + bs soft nondistended  Extrem:  w/o edema    CBC with Differential:    Lab Results   Component Value Date/Time    WBC 8.2 12/06/2022 02:45 AM    RBC 4.55 12/06/2022 02:45 AM    HGB 13.8 12/06/2022 02:45 AM    HCT 41.2 12/06/2022 02:45 AM     12/06/2022 02:45 AM    MCV 90.5 12/06/2022 02:45 AM    MCH 30.3 12/06/2022 02:45 AM    MCHC 33.5 12/06/2022 02:45 AM    RDW 12.2 12/06/2022 02:45 AM    LYMPHOPCT 30.3 12/06/2022 02:45 AM    MONOPCT 6.6 12/06/2022 02:45 AM    BASOPCT 0.4 12/06/2022 02:45 AM    MONOSABS 0.54 12/06/2022 02:45 AM    LYMPHSABS 2.47 12/06/2022 02:45 AM    EOSABS 0.33 12/06/2022 02:45 AM    BASOSABS 0.03 12/06/2022 02:45 AM     CMP:    Lab Results   Component Value Date/Time     12/06/2022 02:45 AM    K 4.2 12/06/2022 02:45 AM     12/06/2022 02:45 AM    CO2 25 12/06/2022 02:45 AM    BUN 15 12/06/2022 02:45 AM    CREATININE 1.1 12/06/2022 02:45 AM    GFRAA >60 09/15/2022 09:32 AM    LABGLOM 57 12/06/2022 02:45 AM    GLUCOSE 107 12/06/2022 02:45 AM    PROT 6.5 12/06/2022 02:45 AM    LABALBU 3.6 12/06/2022 02:45 AM    CALCIUM 9.0 12/06/2022 02:45 AM    BILITOT 0.3 12/06/2022 02:45 AM    ALKPHOS 86 12/06/2022 02:45 AM    AST 21 12/06/2022 02:45 AM    ALT 21 12/06/2022 02:45 AM     Warfarin PT/INR:    Lab Results   Component Value Date    INR 1.2 12/04/2022    PROTIME 13.0 (H) 12/04/2022       Assessment:    Principal Problem:    Bilateral pulmonary embolism (HCC)  Resolved Problems:    * No resolved hospital problems.  *      Plan:  Us legs dc planning check pulsox cont as per saima Xiao,   8:12 AM  12/6/2022

## 2022-12-06 NOTE — PROGRESS NOTES
Brian Chadwick M.D.,[facility]  Shari Morales D.O., F.A.C.O.I., Alvarado Aburto M.D. Rowan Jones M.D. Shalini Partida D.O. Daily Pulmonary Progress Note    Patient:  Roxy Castleman 58 y.o. female MRN: [id number]     Date of Service: 12/6/2022      Synopsis     We are following patient for pulmonary embolism    \"CC\" shortness of breath, chest pain    Code status: Full      Subjective      Patient was seen and examined laying in bed on 2 L nasal cannula. She reports some dyspnea at rest at random times along with associated chest tightness. US BLE positive for LLE DVT. MRI noted-Dr. Harlan Giang following recommending follow up in 1 year. Review of Systems:  Constitutional: Denies fever, weight loss, night sweats. Skin: Denies pigmentation, dark lesions, and rashes   HEENT: Denies hearing loss, tinnitus, ear drainage, epistaxis, sore throat, and hoarseness. Cardiovascular: Denies palpitations, chest pain, and chest pressure.   Respiratory: Chest tightness with dyspnea at times  Gastrointestinal: Denies nausea, vomiting, poor appetite, diarrhea, heartburn or reflux  Genitourinary: Denies dysuria, frequency, urgency or hematuria  Musculoskeletal: Denies myalgias, muscle weakness, and bone pain  Neurological: Denies dizziness, vertigo, headache, and focal weakness  Psychological: Denies anxiety and depression  Endocrine: Denies heat intolerance and cold intolerance  Hematopoietic/Lymphatic: Denies bleeding problems and blood transfusions    24-hour events:  Hem/Onc consult placed  US BLE + DVT LLE  MRI completed    Objective   Vitals: BP (!) 97/47   Pulse 66   Temp 98 °F (36.7 °C) (Temporal)   Resp 16   Ht 5' 6\" (1.676 m)   Wt 156 lb 8 oz (71 kg)   LMP 01/01/2006   SpO2 97%   BMI 25.07 kg/m²     I/O:    Intake/Output Summary (Last 24 hours) at 12/6/2022 1212  Last data filed at 12/6/2022 1155  Gross per 24 hour   Intake 120 ml   Output 685 ml   Net -565 ml CURRENT MEDS :  Scheduled Meds:   therapeutic multivitamin-minerals  1 tablet Oral Daily    vitamin B and C  1 tablet Oral Daily    sodium chloride flush  5-40 mL IntraVENous 2 times per day    pantoprazole  40 mg Oral QAM AC    aluminum & magnesium hydroxide-simethicone  30 mL Oral TID WC    sodium chloride flush  10 mL IntraVENous Once    heparin (porcine)  80 Units/kg IntraVENous Once       Physical Exam:  General Appearance: appears comfortable in no acute distress. On 2 L nasal cannula  HEENT: Normocephalic atraumatic without obvious abnormality   Neck: Lips, mucosa, and tongue normal.  Supple, symmetrical, trachea midline, no adenopathy;thyroid:  no enlargement/tenderness/nodules or JVD. Lung: Breath sounds CTA. Respirations   unlabored. Symmetrical expansion. Heart: RRR, normal S1, S2. No MRG  Abdomen: Soft, NT, ND. BS present x 4 quadrants. No bruit or organomegaly. Extremities: Pedal pulses 2+ symmetric b/l. Extremities normal, no cyanosis, clubbing, or edema. Musculokeletal: No joint swelling, no muscle tenderness. ROM normal in all joints of extremities. Neurologic: Mental status: Alert and Oriented X3 . Pertinent/ New Labs and Imaging Studies     Imaging Personally Reviewed:  Chest x-ray 12/3/2022:    FINDINGS:   The lungs are without acute focal process. There is no effusion or   pneumothorax. The cardiomediastinal silhouette is without acute process. The   osseous structures are without acute process. 3 mm probable calcified   granuloma left upper lobe. Impression   No acute process. Pulmonary CTA 12/3/2022:    Impression   1. Positive examination for bilateral pulmonary emboli with evidence of right   heart strain. 2. Ground-glass opacities bilaterally likely secondary to bilateral pulmonary   emboli. 3. View of the upper abdomen shows cystic lesions at level of pancreatic head   and pancreatic body measuring up to 1.4 x 1 cm.   MRI could be helpful for   further evaluation. ECHO 12/4/2022:  Summary   Left ventricular internal dimensions were normal in diastole and systole. No regional wall motion abnormalities seen. Normal left ventricular ejection fraction. Right ventricular septum flattened in systole consistent with RV pressure   overload. Moderate tricuspid regurgitation. Pulmonary hypertension is moderate . US BLE 12/6/22: There is evidence for deep venous thrombosis in the left popliteal   vein the tibioperoneal trunk which is nonocclusive and occlusive   thrombosis in the left peroneal veins   There is otherwise good compressibility, there is good augmentation,   there is good color flow. Impression   There is evidence for deep venous thrombosis       ALERT:  THIS IS AN ABNORMAL REPORT     Labs:  Lab Results   Component Value Date/Time    WBC 8.2 12/06/2022 02:45 AM    HGB 13.8 12/06/2022 02:45 AM    HCT 41.2 12/06/2022 02:45 AM    MCV 90.5 12/06/2022 02:45 AM    MCH 30.3 12/06/2022 02:45 AM    MCHC 33.5 12/06/2022 02:45 AM    RDW 12.2 12/06/2022 02:45 AM     12/06/2022 02:45 AM    MPV 9.3 12/06/2022 02:45 AM     Lab Results   Component Value Date/Time     12/06/2022 02:45 AM    K 4.2 12/06/2022 02:45 AM     12/06/2022 02:45 AM    CO2 25 12/06/2022 02:45 AM    BUN 15 12/06/2022 02:45 AM    CREATININE 1.1 12/06/2022 02:45 AM    LABALBU 3.6 12/06/2022 02:45 AM    CALCIUM 9.0 12/06/2022 02:45 AM    GFRAA >60 09/15/2022 09:32 AM    LABGLOM 57 12/06/2022 02:45 AM     Lab Results   Component Value Date/Time    PROTIME 13.0 12/04/2022 11:01 AM    INR 1.2 12/04/2022 11:01 AM     Recent Labs     12/06/22  0245   PROBNP 152*       No results for input(s): PROCAL in the last 72 hours. This SmartLink has not been configured with any valid records. Micro:  No results for input(s): CULTRESP in the last 72 hours. No results for input(s): LABGRAM in the last 72 hours.   No results for input(s): LEGUR in the last 72 hours. No results for input(s): STREPNEUMAGU in the last 72 hours. No results for input(s): LP1UAG in the last 72 hours. Assessment:    Acute multiple bilateral pulmonary emboli-submassive pulmonary embolism-considered unprovoked  Left lower extremity DVT  Cystic pancreatic lesions-status post MRCP 12/5/2022  GERD  Osteoarthritis      Plan:   Wean supplemental oxygen to maintain SPO2 greater than 92%  Echo reviewed-continue heparin drip at this time  Change heparin to eliquis once RV function has been [id number] with Dr. Anastacia Ricketts echo ordered to re-evaluate RV function  MRCP reviewed-follow up with Dr. Kathy Avelar in 1 year  US BLE + DVT LLE      This plan of care was reviewed in collaboration with Dr. Christa Villatoro  Electronically signed by RUKHSANA Rain CNP on 12/6/2022 at 12:12 PM    Addendum:  I personally saw, examined, and cared for the patient. Labs, medications, radiographs reviewed. I personally talked with Dr. Ishan Torres regarding monitors echocardiogram findings at the time of her admission. Dr. Ishan Torres explained to me in detail that was no signs of RV strain based on her echo although she did have pulmonary hypertension but he did not believe it was related to her pulmonary embolism. We both agreed that probably the best course would be to repeat a limited echocardiogram and if there is no RV strain or pulmonary hypertension I could safely go ahead and transition patient to oral DOAC. A limited echocardiogram was done. Dr. Ishan Torres immediately read the echo and inform me that the patient did not have any RV strain and the RSVP was normal.  His input deeply appreciated. Move forward with DC heparin drip and starting patient on Eliquis 10 mg twice daily for 7 days followed by 5 mg twice daily. At this point Lynnette's conditions appears to be unprovoked. A thrombophilia panel has been sent. But I also recommend for evaluation by hematology oncology.     Continue to wean FiO2 for saturations above 92%.     Abbe Campos MD

## 2022-12-06 NOTE — PROGRESS NOTES
Echo department notified via voicemail of limited echo needing done to determine treatment start per Cardio/pulmonary.

## 2022-12-06 NOTE — PATIENT CARE CONFERENCE
[facility] Quality Flow/Interdisciplinary Rounds Progress Note        Quality Flow Rounds held on December 6, 2022    Disciplines Attending:  Bedside Nurse, , , and Nursing Unit Leadership    Israel Valle was admitted on 12/3/2022  3:33 PM    Anticipated Discharge Date:       Disposition:    Efraín Score:  Efraín Scale Score: 21    Readmission Risk              Risk of Unplanned Readmission:  11           Discussed patient goal for the day, patient clinical progression, and barriers to discharge.   The following Goal(s) of the Day/Commitment(s) have been identified:  Diagnostics - Report Results      Gin Patterson RN  December 6, 2022

## 2022-12-06 NOTE — PROGRESS NOTES
HBP and Pancreatic Surgery  DAILY PROGRESS NOTE  12/6/2022    CHIEF COMPLAINT:  Chief Complaint   Patient presents with    Shortness of Breath     Chest tightness and tightness in upper neck. Irregular heart beat and fast heart rate. Started this am.     Chest Pain       SUBJECTIVE:  Patient denies any abdominal pain, no acute events overnight. Overall feels all right. Imaging findings were discussed this morning with the patient. OBJECTIVE:  BP (!) 95/52   Pulse 77   Temp 97.6 °F (36.4 °C) (Temporal)   Resp 18   Ht 5' 6\" (1.676 m)   Wt 156 lb 8 oz (71 kg)   LMP 01/01/2006   SpO2 97%   BMI 25.07 kg/m²     GENERAL: No acute distress, alert and oriented  EYES: No obvious scleral icterus  LUNGS:  No increased work of breathing on 3 L nasal cannula  CARDIOVASCULAR: Regular rate  ABDOMEN: Soft, nondistended, nontender without rigidity or rebound  EXTREMITIES: No pitting lower extremity edema    ASSESSMENT/PLAN:  58 y.o. female admitted for PE and found to have incidental pancreatic cystic lesions. MRI consistent with branched IPMN. Plan:  -Continued surveillance of branched IPMN outpatient  -Will likely need repeat MRI in 1 year  -Recommend follow-up with Dr. John Mills when able    Sebastian Link DO  Surgery Resident PGY-1  12/6/2022  7:26 AM     BD-IPMN seen on MRI  Will need MRI in 6 months  Discussed this with Ms.  Mariodagokallie Perry  Will see her back in my office upon discharge    Electronically signed by Amador Martinez MD on 12/6/2022 at 2:23 PM

## 2022-12-06 NOTE — CONSULTS
[address] [facility] [address]  [address] [place]  Dept: [phone]  Loc: [phone]  Attending Consult Note      Reason for Visit:   Bilateral PE. Referring Physician:  Noelle Dior DO    PCP:  Pretty Bethea MD    History of Present Illness:      Mrs. Ethel Fleming is a 57-year-old lady with a past medical history significant for chronic back pain, GERD, LAURIE, depression, who had presented to the ED on 12/3/2022 with worsening shortness of breath for 2 weeks, was associated with palpitations, chest CTA had revealed bilateral pulmonary emboli, with evidence of right heart strain, groundglass opacities bilaterally, likely secondary to bilateral pulmonary emboli, cystic lesions at the level of pancreatic head and pancreatic body measuring up to 1.4 cm. A stat 2D echocardiogram was done which showed normal RV size and function. She had increased pulmonary pressures. The patient was started on heparin drip. subsequently had MRI of the abdomen with and without contrast done, revealing endings consistent with sidebranch IPMN configuration. Bilateral lower extremity venous ultrasound had revealed DVT in the left popliteal vein, the tibioperoneal trunk, which is nonocclusive, occlusive thrombosis in the left peroneal veins. COVID-19 testing was negative. Bilateral screening mammogram from 11/24/2021 was negative for malignancy. Testing for inherited thrombophilia was ordered. The patient had a 5 to 6-hour car trip to [place] on November 8, returned on November 11, 5 to 6 hours each direction no recent surgeries. She received the COVID-vaccine on November 2. She is not on hormonal therapy. Her paternal grandmother had DVTs and PEs, her paternal cousin had a provoked PE. No history of miscarriages. She is feeling better, currently on RA. Review of Systems;  CONSTITUTIONAL: No fever, chills. Good appetite and energy level. ENMT: Eyes: No diplopia;  Nose: No epistaxis. Mouth: No sore throat. RESPIRATORY: No hemoptysis, positive for shortness of breath,  CARDIOVASCULAR: Positive for chest pain, palpitations. GASTROINTESTINAL: Positive for indigestion and acid reflux, no nausea/vomiting, abdominal pain, diarrhea/constipation. GENITOURINARY: No dysuria, urinary frequency, hematuria. NEURO: No syncope, presyncope, headache.   Remainder:  ROS NEGATIVE    Past Medical History:      Diagnosis Date    Abnormal Pap smear of cervix 2011 apx    Arthritis     cervical    Chronic back pain     Depression     GALEANA (dyspnea on exertion)     Gastritis     GERD (gastroesophageal reflux disease)     Hiatal hernia 03/11/2015    Osteoarthritis     Palpitations     Partial tear of rotator cuff     PONV (postoperative nausea and vomiting)     Shingles 2007    Syncope 2012    TMJ (dislocation of temporomandibular joint)      Patient Active Problem List   Diagnosis    GALEANA (dyspnea on exertion)    GERD (gastroesophageal reflux disease)    Arthritis    Syncope    Palpitations    LUQ pain    Family history of colon cancer    Gastritis    Hiatal hernia    Cervical dysplasia HX LGSIL 2013 and high risk HPV 2014    Postmenopausal bleeding    Abnormal ultrasound of endometrium    Cervical stenosis (uterine cervix)    Bilateral pulmonary embolism ([address]    Pancreatic cyst        Past Surgical History:      Procedure Laterality Date    APPENDECTOMY  1970    CERVICAL POLYP REMOVAL      CHOLECYSTECTOMY, LAPAROSCOPIC  2003    COLONOSCOPY  03/11/2015    Dr. Danielle Birmingham  2011    ENDOSCOPY, COLON, DIAGNOSTIC  2003, 2015 2015 with colonoscopy dr. Rudy Jimenez    HYSTEROSCOPY  09/08/2016    and D&C     LAPAROSCOPY  1989    ovarian cysts    LASIK  1998    TONSILLECTOMY AND ADENOIDECTOMY  1985    WISDOM TOOTH EXTRACTION  1983    x 4       Family History:  Family History   Problem Relation Age of Onset    Arthritis Mother     Heart Disease Mother     High Blood Pressure Mother     High Cholesterol Mother     Kidney Disease Mother     Stroke Mother     Arthritis Father     Heart Disease Father     Asthma Brother     Arthritis Maternal Aunt     High Blood Pressure Maternal Aunt     High Cholesterol Maternal Aunt     Stroke Maternal Aunt     Asthma Maternal Uncle     Birth Defects Maternal Uncle     Cancer Maternal Uncle     Heart Disease Maternal Uncle     High Blood Pressure Maternal Uncle     High Cholesterol Maternal Uncle     Stroke Maternal Uncle     Arthritis Paternal Aunt     Birth Defects Paternal Aunt     Cancer Paternal Aunt     Birth Defects Paternal Uncle     Cancer Paternal Uncle     Arthritis Maternal Grandmother     Kidney Disease Maternal Grandmother     Heart Disease Maternal Grandfather     High Blood Pressure Maternal Grandfather     Arthritis Paternal Grandmother     Heart Disease Paternal Grandmother     Stroke Paternal Grandmother     Cancer Paternal Cousin        Medications:  Reviewed and reconciled.     Social History:  Social History     Socioeconomic History    Marital status:      Spouse name: Not on file    Number of children: Not on file    Years of education: Not on file    Highest education level: Not on file   Occupational History    Not on file   Tobacco Use    Smoking status: Former     Types: Cigarettes     Quit date: 1981     Years since quittin.2    Smokeless tobacco: Never    Tobacco comments:     only smoked x 1 year in college   Substance and Sexual Activity    Alcohol use: No     Alcohol/week: 0.0 standard drinks     Comment: rarely    Drug use: No    Sexual activity: Yes     Partners: Male     Birth control/protection: Post-menopausal   Other Topics Concern    Not on file   Social History Narrative    Not on file     Social Determinants of Health     Financial Resource Strain: Not on file   Food Insecurity: Not on file   Transportation Needs: Not on file   Physical Activity: Not on file   Stress: Not on file   Social Connections: Not on file   Intimate Partner Violence: Not on file   Housing Stability: Not on file       Allergies: Allergies   Allergen Reactions    Other Anaphylaxis     Powder in exam gloves    Sucralfate Rash       Physical Exam:  BP 99/60   Pulse 72   Temp 97.3 °F (36.3 °C) (Temporal)   Resp 16   Ht 5' 6\" (1.676 m)   Wt 156 lb 8 oz (71 kg)   LMP 01/01/2006   SpO2 96%   BMI 25.07 kg/m²   GENERAL: Alert, oriented x 3, not in acute distress. HEENT: PERRLA; EOMI. Oropharynx clear. NECK: Supple. No palpable cervical or supraclavicular lymphadenopathy. LUNGS: Good air entry bilaterally. No wheezing, crackles or rhonchi. CARDIOVASCULAR: Regular rate. No murmurs, rubs or gallops. ABDOMEN: Soft. Non-tender, non-distended. Positive bowel sounds. EXTREMITIES: Without clubbing, cyanosis, or edema. NEUROLOGIC: No focal deficits. ECOG PS 0       Impression/Plan:      Mrs. Hola Acosta is a 72-year-old lady with a past medical history significant for chronic back pain, GERD, LAURIE, depression, who had presented to the ED on 12/3/2022 with worsening shortness of breath for 2 weeks, was associated with palpitations, chest CTA had revealed bilateral pulmonary emboli, with evidence of right heart strain, groundglass opacities bilaterally, likely secondary to bilateral pulmonary emboli, cystic lesions at the level of pancreatic head and pancreatic body measuring up to 1.4 cm. A stat 2D echocardiogram was done which showed normal RV size and function. She had increased pulmonary pressures. The patient was started on heparin drip. subsequently had MRI of the abdomen with and without contrast done, revealing endings consistent with sidebranch IPMN configuration. Bilateral lower extremity venous ultrasound had revealed DVT in the left popliteal vein, the tibioperoneal trunk, which is nonocclusive, occlusive thrombosis in the left peroneal veins. COVID-19 testing was negative.   Bilateral screening mammogram from 11/24/2021 was negative for malignancy. She is scheduled for a repeat mammogram, last coloscopy was in 2050, was recommended repeat coloscopy after 10 years, testing for inherited thrombophilia was ordered. The patient had a 5 to 6-hour car trip to [place] on November 8, returned on November 11, 5 to 6 hours each direction no recent surgeries. She received the COVID-vaccine on November 2. She is not on hormonal therapy. Her paternal grandmother had DVTs and PEs, her paternal cousin had a provoked PE. No history of miscarriages. The patient has left lower extremity DVT, and bilateral pulmonary emboli, the event was likely provoked by the long travel, and possibly the COVID 19 vaccine.   -Recommended testing for inherited thrombophilia and antiphospholipid antibody syndrome.  -The patient is up-to-date with age-appropriate screening. -CT scans of the chest and MRI of the abdomen without evidence of malignancy.  -She has IPMN, the patient was seen by Dr. Albino Bernheim, plan for repeat MRI in 6 months.  -The patient doing better clinically, currently on RA, was transitioned to Eliquis. Would recommend anticoagulation for 6 months to 1 year. -D/C per Primary team and Pulmonary, hematology will sign off, please call or PS for any questions, will follow up after discharge from the hospital to review the hypercoag work-up and will monitor her while on anticoagulation. Case was discussed with Dr. Julius Jasmine. Thank you for allowing us to participate in the care of Mrs. Lauren Olivier.     Sadia Parrish MD   HEMATOLOGY/MEDICAL ONCOLOGY  [facility]  [address] [facility] [address] [place] [address]  Dept: [address]: [phone]

## 2022-12-07 VITALS
HEIGHT: 66 IN | BODY MASS INDEX: 24.68 KG/M2 | HEART RATE: 76 BPM | OXYGEN SATURATION: 99 % | SYSTOLIC BLOOD PRESSURE: 104 MMHG | WEIGHT: 153.6 LBS | TEMPERATURE: 97.6 F | RESPIRATION RATE: 16 BRPM | DIASTOLIC BLOOD PRESSURE: 70 MMHG

## 2022-12-07 LAB
ALBUMIN SERPL-MCNC: 3.4 G/DL (ref 3.5–5.2)
ALP BLD-CCNC: 79 U/L (ref 35–104)
ALT SERPL-CCNC: 29 U/L (ref 0–32)
ANION GAP SERPL CALCULATED.3IONS-SCNC: 10 MMOL/L (ref 7–16)
AST SERPL-CCNC: 29 U/L (ref 0–31)
AT-III ACTIVITY: 95 % ACTIVITY (ref 83–121)
BASOPHILS ABSOLUTE: 0.03 E9/L (ref 0–0.2)
BASOPHILS RELATIVE PERCENT: 0.5 % (ref 0–2)
BILIRUB SERPL-MCNC: 0.4 MG/DL (ref 0–1.2)
BUN BLDV-MCNC: 15 MG/DL (ref 6–23)
CALCIUM SERPL-MCNC: 9.1 MG/DL (ref 8.6–10.2)
CHLORIDE BLD-SCNC: 105 MMOL/L (ref 98–107)
CO2: 23 MMOL/L (ref 22–29)
CREAT SERPL-MCNC: 1 MG/DL (ref 0.5–1)
EOSINOPHILS ABSOLUTE: 0.29 E9/L (ref 0.05–0.5)
EOSINOPHILS RELATIVE PERCENT: 4.4 % (ref 0–6)
GFR SERPL CREATININE-BSD FRML MDRD: >60 ML/MIN/1.73
GLUCOSE BLD-MCNC: 100 MG/DL (ref 74–99)
HCT VFR BLD CALC: 40.9 % (ref 34–48)
HEMOGLOBIN: 13.5 G/DL (ref 11.5–15.5)
IMMATURE GRANULOCYTES #: 0.02 E9/L
IMMATURE GRANULOCYTES %: 0.3 % (ref 0–5)
LYMPHOCYTES ABSOLUTE: 1.69 E9/L (ref 1.5–4)
LYMPHOCYTES RELATIVE PERCENT: 25.7 % (ref 20–42)
MAGNESIUM: 2.3 MG/DL (ref 1.6–2.6)
MCH RBC QN AUTO: 30.4 PG (ref 26–35)
MCHC RBC AUTO-ENTMCNC: 33 % (ref 32–34.5)
MCV RBC AUTO: 92.1 FL (ref 80–99.9)
MONOCYTES ABSOLUTE: 0.43 E9/L (ref 0.1–0.95)
MONOCYTES RELATIVE PERCENT: 6.5 % (ref 2–12)
NEUTROPHILS ABSOLUTE: 4.11 E9/L (ref 1.8–7.3)
NEUTROPHILS RELATIVE PERCENT: 62.6 % (ref 43–80)
PDW BLD-RTO: 12.3 FL (ref 11.5–15)
PHOSPHORUS: 3.5 MG/DL (ref 2.5–4.5)
PLATELET # BLD: 194 E9/L (ref 130–450)
PMV BLD AUTO: 9.5 FL (ref 7–12)
POTASSIUM SERPL-SCNC: 4.1 MMOL/L (ref 3.5–5)
PROTEIN C ACTIVITY: >120 % ACTIVITY (ref 68–165)
RBC # BLD: 4.44 E12/L (ref 3.5–5.5)
SODIUM BLD-SCNC: 138 MMOL/L (ref 132–146)
TOTAL PROTEIN: 6.1 G/DL (ref 6.4–8.3)
WBC # BLD: 6.6 E9/L (ref 4.5–11.5)

## 2022-12-07 PROCEDURE — 97535 SELF CARE MNGMENT TRAINING: CPT

## 2022-12-07 PROCEDURE — 86147 CARDIOLIPIN ANTIBODY EA IG: CPT

## 2022-12-07 PROCEDURE — 80053 COMPREHEN METABOLIC PANEL: CPT

## 2022-12-07 PROCEDURE — 97165 OT EVAL LOW COMPLEX 30 MIN: CPT

## 2022-12-07 PROCEDURE — 6370000000 HC RX 637 (ALT 250 FOR IP)

## 2022-12-07 PROCEDURE — 85303 CLOT INHIBIT PROT C ACTIVITY: CPT

## 2022-12-07 PROCEDURE — 86146 BETA-2 GLYCOPROTEIN ANTIBODY: CPT

## 2022-12-07 PROCEDURE — 85306 CLOT INHIBIT PROT S FREE: CPT

## 2022-12-07 PROCEDURE — 85025 COMPLETE CBC W/AUTO DIFF WBC: CPT

## 2022-12-07 PROCEDURE — 84100 ASSAY OF PHOSPHORUS: CPT

## 2022-12-07 PROCEDURE — 6370000000 HC RX 637 (ALT 250 FOR IP): Performed by: INTERNAL MEDICINE

## 2022-12-07 PROCEDURE — 2580000003 HC RX 258: Performed by: INTERNAL MEDICINE

## 2022-12-07 PROCEDURE — 36415 COLL VENOUS BLD VENIPUNCTURE: CPT

## 2022-12-07 PROCEDURE — 85300 ANTITHROMBIN III ACTIVITY: CPT

## 2022-12-07 PROCEDURE — 97530 THERAPEUTIC ACTIVITIES: CPT

## 2022-12-07 PROCEDURE — 83735 ASSAY OF MAGNESIUM: CPT

## 2022-12-07 RX ORDER — 0.9 % SODIUM CHLORIDE 0.9 %
250 INTRAVENOUS SOLUTION INTRAVENOUS ONCE
Status: COMPLETED | OUTPATIENT
Start: 2022-12-07 | End: 2022-12-07

## 2022-12-07 RX ORDER — SODIUM CHLORIDE 9 MG/ML
INJECTION, SOLUTION INTRAVENOUS CONTINUOUS
Status: DISCONTINUED | OUTPATIENT
Start: 2022-12-07 | End: 2022-12-07 | Stop reason: HOSPADM

## 2022-12-07 RX ADMIN — MAGNESIUM HYDROXIDE/ALUMINUM HYDROXICE/SIMETHICONE 30 ML: 120; 1200; 1200 SUSPENSION ORAL at 08:57

## 2022-12-07 RX ADMIN — SODIUM CHLORIDE 250 ML: 9 INJECTION, SOLUTION INTRAVENOUS at 12:59

## 2022-12-07 RX ADMIN — PANTOPRAZOLE SODIUM 40 MG: 40 TABLET, DELAYED RELEASE ORAL at 05:51

## 2022-12-07 RX ADMIN — SODIUM CHLORIDE, PRESERVATIVE FREE 10 ML: 5 INJECTION INTRAVENOUS at 08:58

## 2022-12-07 RX ADMIN — Medication 1 TABLET: at 08:57

## 2022-12-07 RX ADMIN — MAGNESIUM HYDROXIDE/ALUMINUM HYDROXICE/SIMETHICONE 30 ML: 120; 1200; 1200 SUSPENSION ORAL at 12:54

## 2022-12-07 RX ADMIN — Medication 1 TABLET: at 08:58

## 2022-12-07 RX ADMIN — APIXABAN 10 MG: 5 TABLET, FILM COATED ORAL at 08:57

## 2022-12-07 NOTE — FLOWSHEET NOTE
12/07/22 1530   Vital Signs   Orthostatic B/P and Pulse?  Yes   Blood Pressure Lying 103/65   Pulse Lying 86 PER MINUTE   Blood Pressure Sitting 107/68   Pulse Sitting 91 PER MINUTE   Blood Pressure Standing 106/74   Pulse Standing 96 PER MINUTE

## 2022-12-07 NOTE — PROGRESS NOTES
Pt had a new feeling of \"a wave\" coming over and questioned if her heart rhythm had changed since she has had PVCs and PACs before. Checked vitals and they were 93/51 which she claims is normal for her, HR 66, Resp 18, SPO2 97% on room air. Eneida Dee RN

## 2022-12-07 NOTE — PLAN OF CARE
Problem: Discharge Planning  Goal: Discharge to home or other facility with appropriate resources  12/6/2022 2358 by Billy Valdovinos RN  Outcome: Progressing  12/6/2022 1918 by Rakel Lu RN  Outcome: Progressing     Problem: Safety - Adult  Goal: Free from fall injury  12/6/2022 2358 by Billy Valdovinos RN  Outcome: Progressing  12/6/2022 1918 by Rakel Lu RN  Outcome: Progressing     Problem: ABCDS Injury Assessment  Goal: Absence of physical injury  12/6/2022 2358 by Billy Valdovinos RN  Outcome: Progressing  12/6/2022 1918 by Rakel Lu RN  Outcome: Progressing

## 2022-12-07 NOTE — PROGRESS NOTES
Messaged Pulm via [employer] and questioned if the aptts needed to be continued since heparin was off and Radha did not see the need to continue the aptts.  She also had allowed the pt to come off of bed Rodrigue Anderson RN

## 2022-12-07 NOTE — PROGRESS NOTES
Dr. Amanda Mccormick aware that pt complained of a strange feeling in head and that if she stood up she felt like she could pass out. Pt did not stand up and did not pass out. Vitals were stable at time of complaint.

## 2022-12-07 NOTE — PROGRESS NOTES
Rafael Wilcox M.D.,[facility]  Morena Ramos D.O., F.A.C.O.I., Amrit Strauss M.D. Emma Linder M.D. Gisela Schmitt D.O. Daily Pulmonary Progress Note    Patient:  Gretta Otto 58 y.o. female MRN: [id number]     Date of Service: 12/7/2022      Synopsis     We are following patient for pulmonary embolism    \"CC\" shortness of breath, chest pain    Code status: Full      Subjective      Patient was seen and examined standing up at the bedside with therapy.  visiting. Heparin has been discontinued and patient was started on Eliquis. Heme-onc recommends anticoagulation for at least 1 year. Limited echo done and reviewed with no RV strain or pulmonary hypertension. Review of Systems:  Constitutional: Denies fever, weight loss, night sweats. Skin: Denies pigmentation, dark lesions, and rashes   HEENT: Denies hearing loss, tinnitus, ear drainage, epistaxis, sore throat, and hoarseness. Cardiovascular: Denies palpitations, chest pain, and chest pressure.   Respiratory: Chest tightness with dyspnea at times  Gastrointestinal: Denies nausea, vomiting, poor appetite, diarrhea, heartburn or reflux  Genitourinary: Denies dysuria, frequency, urgency or hematuria  Musculoskeletal: Denies myalgias, muscle weakness, and bone pain  Neurological: Denies dizziness, vertigo, headache, and focal weakness  Psychological: Denies anxiety and depression  Endocrine: Denies heat intolerance and cold intolerance  Hematopoietic/Lymphatic: Denies bleeding problems and blood transfusions    24-hour events:  Eliquis started  Limited echo completed    Objective   Vitals: /70   Pulse 76   Temp 97.6 °F (36.4 °C) (Temporal)   Resp 16   Ht 5' 6\" (1.676 m)   Wt 153 lb 9.6 oz (69.7 kg)   LMP 01/01/2006   SpO2 99%   BMI 24.60 kg/m²     I/O:    Intake/Output Summary (Last 24 hours) at 12/7/2022 1533  Last data filed at 12/7/2022 1348  Gross per 24 hour   Intake 850 ml   Output --   Net 850 ml CURRENT MEDS :  Scheduled Meds:   apixaban  10 mg Oral BID    Followed by    Seble Abler ON 12/13/2022] apixaban  5 mg Oral BID    therapeutic multivitamin-minerals  1 tablet Oral Daily    vitamin B and C  1 tablet Oral Daily    sodium chloride flush  5-40 mL IntraVENous 2 times per day    pantoprazole  40 mg Oral QAM AC    aluminum & magnesium hydroxide-simethicone  30 mL Oral TID WC    sodium chloride flush  10 mL IntraVENous Once       Physical Exam:  General Appearance: appears comfortable in no acute distress. HEENT: Normocephalic atraumatic without obvious abnormality   Neck: Lips, mucosa, and tongue normal.  Supple, symmetrical, trachea midline, no adenopathy;thyroid:  no enlargement/tenderness/nodules or JVD. Lung: Breath sounds CTA. Respirations   unlabored. Symmetrical expansion. Heart: RRR, normal S1, S2. No MRG  Abdomen: Soft, NT, ND. BS present x 4 quadrants. No bruit or organomegaly. Extremities: Pedal pulses 2+ symmetric b/l. Extremities normal, no cyanosis, clubbing, or edema. Musculokeletal: No joint swelling, no muscle tenderness. ROM normal in all joints of extremities. Neurologic: Mental status: Alert and Oriented X3 . Pertinent/ New Labs and Imaging Studies     Imaging Personally Reviewed:  Chest x-ray 12/3/2022:    FINDINGS:   The lungs are without acute focal process. There is no effusion or   pneumothorax. The cardiomediastinal silhouette is without acute process. The   osseous structures are without acute process. 3 mm probable calcified   granuloma left upper lobe. Impression   No acute process. Pulmonary CTA 12/3/2022:    Impression   1. Positive examination for bilateral pulmonary emboli with evidence of right   heart strain. 2. Ground-glass opacities bilaterally likely secondary to bilateral pulmonary   emboli. 3. View of the upper abdomen shows cystic lesions at level of pancreatic head   and pancreatic body measuring up to 1.4 x 1 cm.   MRI could be helpful for   further evaluation. ECHO 12/4/2022:  Summary   Left ventricular internal dimensions were normal in diastole and systole. No regional wall motion abnormalities seen. Normal left ventricular ejection fraction. Right ventricular septum flattened in systole consistent with RV pressure   overload. Moderate tricuspid regurgitation. Pulmonary hypertension is moderate . US BLE 12/6/22: There is evidence for deep venous thrombosis in the left popliteal   vein the tibioperoneal trunk which is nonocclusive and occlusive   thrombosis in the left peroneal veins   There is otherwise good compressibility, there is good augmentation,   there is good color flow. Impression   There is evidence for deep venous thrombosis       ALERT:  THIS IS AN ABNORMAL REPORT     Labs:  Lab Results   Component Value Date/Time    WBC 6.6 12/07/2022 06:44 AM    HGB 13.5 12/07/2022 06:44 AM    HCT 40.9 12/07/2022 06:44 AM    MCV 92.1 12/07/2022 06:44 AM    MCH 30.4 12/07/2022 06:44 AM    MCHC 33.0 12/07/2022 06:44 AM    RDW 12.3 12/07/2022 06:44 AM     12/07/2022 06:44 AM    MPV 9.5 12/07/2022 06:44 AM     Lab Results   Component Value Date/Time     12/07/2022 06:44 AM    K 4.1 12/07/2022 06:44 AM     12/07/2022 06:44 AM    CO2 23 12/07/2022 06:44 AM    BUN 15 12/07/2022 06:44 AM    CREATININE 1.0 12/07/2022 06:44 AM    LABALBU 3.4 12/07/2022 06:44 AM    CALCIUM 9.1 12/07/2022 06:44 AM    GFRAA >60 09/15/2022 09:32 AM    LABGLOM >60 12/07/2022 06:44 AM     Lab Results   Component Value Date/Time    PROTIME 13.0 12/04/2022 11:01 AM    INR 1.2 12/04/2022 11:01 AM     Recent Labs     12/06/22  0245   PROBNP 152*       No results for input(s): PROCAL in the last 72 hours. This SmartLink has not been configured with any valid records. Micro:  No results for input(s): CULTRESP in the last 72 hours. No results for input(s): LABGRAM in the last 72 hours.   No results for input(s): LEGUR in the last 72 hours. No results for input(s): STREPNEUMAGU in the last 72 hours. No results for input(s): LP1UAG in the last 72 hours. Assessment:    Acute multiple bilateral pulmonary emboli-submassive pulmonary embolism-considered unprovoked  Left lower extremity DVT  Cystic pancreatic lesions-status post MRCP 12/5/2022  GERD  Osteoarthritis      Plan:   Continue to monitor off oxygen  Limited echo reviewed-no RV strain or pulmonary hypertension  Thrombophilia panel pending  Coag panel pending  Started Eliquis loading dose followed by maintenance dose-will require at least 1 year of treatment-hematology to manage  MRCP reviewed-follow up with Dr. Judah Phoenix in 1 year  IV fluid bolus for hypotension-management per primary  Patient is okay to discharge from pulmonary standpoint-we will require a VQ scan sometime in January-routed to office    This plan of care was reviewed in collaboration with Dr. Gissel Melendez  Electronically signed by RUKHSANA Laureano CNP on 12/7/2022 at 3:33 PM    Addendum:    Shania Figueroa had an episode of hypotension early this morning but when id was checked with a manual cuff her BP was ok. She did not have any desaturations with ambulation. Answered all her questions. Appreciate dr. Kia Elaine. She is stable to be discharged from a pulmonary stand point. Eliuqis 10 mg bid for total  days followed by 5 mg bid. I will see patient in follow up in 6 week with V/Q scan.     Esperanza Fields MD

## 2022-12-07 NOTE — PROGRESS NOTES
This nurse called answering service again to Dr. Mireles Monday for dc orders. DC order placed by Dr. Mireles Monday. Pt's prescriptions sent to [facility]. Monitor off and IV's out. Discharge instructions read. All questions answered to patient and family.

## 2022-12-07 NOTE — PROGRESS NOTES
PULSE OX ON ROOM AIR SITTING 96%  PULSE OX ON ROOM AIR AMBULATING 95%    Pt did not need oxygen for recovery.  Pt did not desaturate below 95%

## 2022-12-07 NOTE — PLAN OF CARE
Problem: Discharge Planning  Goal: Discharge to home or other facility with appropriate resources  12/7/2022 1249 by Muna Lima RN  Outcome: Progressing     Problem: Safety - Adult  Goal: Free from fall injury  12/7/2022 1249 by Muna Lima RN  Outcome: Progressing     Problem: ABCDS Injury Assessment  Goal: Absence of physical injury  12/7/2022 1249 by Muna Lima RN  Outcome: Progressing

## 2022-12-07 NOTE — PROGRESS NOTES
Hospital Medicine    Subjective:  pt alert conversive on rm air / us legs + dvt left leg      Current Facility-Administered Medications:     famotidine (PEPCID) tablet 20 mg, 20 mg, Oral, BID PRN, Kathie Mejía DO    perflutren lipid microspheres (DEFINITY) injection 1.5 mL, 1.5 mL, IntraVENous, ONCE PRN, Helga Avelar APRN - CNP    apixaban (ELIQUIS) tablet 10 mg, 10 mg, Oral, BID, 10 mg at 12/06/22 2042 **FOLLOWED BY** [START ON 12/13/2022] apixaban (ELIQUIS) tablet 5 mg, 5 mg, Oral, BID, Janay Galloway MD    therapeutic multivitamin-minerals 1 tablet, 1 tablet, Oral, Daily, Ashley L De Dios, DO, 1 tablet at 12/06/22 1221    vitamin B and C (TOTAL B-C) 1 tablet, 1 tablet, Oral, Daily, Ashley L De Dios, DO, 1 tablet at 12/06/22 1221    sodium chloride flush 0.9 % injection 5-40 mL, 5-40 mL, IntraVENous, 2 times per day, Mile Borer, DO, 10 mL at 12/06/22 2043    sodium chloride flush 0.9 % injection 5-40 mL, 5-40 mL, IntraVENous, PRN, Ashley L De Dios, DO    0.9 % sodium chloride infusion, , IntraVENous, PRN, Ashley L De Dios, DO    ondansetron (ZOFRAN-ODT) disintegrating tablet 4 mg, 4 mg, Oral, Q8H PRN **OR** ondansetron (ZOFRAN) injection 4 mg, 4 mg, IntraVENous, Q6H PRN, Ashley L De Dios, DO    polyethylene glycol (GLYCOLAX) packet 17 g, 17 g, Oral, Daily PRN, Ashley L De Dios, DO    acetaminophen (TYLENOL) tablet 650 mg, 650 mg, Oral, Q6H PRN, 650 mg at 12/06/22 1130 **OR** acetaminophen (TYLENOL) suppository 650 mg, 650 mg, Rectal, Q6H PRN, Ashley L De Dios, DO    perflutren lipid microspheres (DEFINITY) injection 1.5 mL, 1.5 mL, IntraVENous, ONCE PRN, Ashley L De Dios, DO    perflutren lipid microspheres (DEFINITY) injection 1.5 mL, 1.5 mL, IntraVENous, ONCE PRN, Ashley L De Dios, DO    pantoprazole (PROTONIX) tablet 40 mg, 40 mg, Oral, QAM AC, Ashley L De Dios, DO, 40 mg at 12/07/22 0551    aluminum & magnesium hydroxide-simethicone (MAALOX) 200-200-20 MG/5ML suspension 30 mL, 30 mL, Oral, TID WC, Jia Celestin MD, 30 mL at 12/06/22 1857    sodium chloride flush 0.9 % injection 10 mL, 10 mL, IntraVENous, Once, Ashley De Dios DO    heparin (porcine) injection 5,880 Units, 80 Units/kg, IntraVENous, PRN, Mile Borer, DO, 5,880 Units at 12/03/22 1915    heparin (porcine) injection 2,940 Units, 40 Units/kg, IntraVENous, PRN, Mile Borer, DO, 2,940 Units at 12/06/22 1512    Objective:    /70   Pulse 76   Temp 97.6 °F (36.4 °C) (Temporal)   Resp 16   Ht 5' 6\" (1.676 m)   Wt 153 lb 9.6 oz (69.7 kg)   LMP 01/01/2006   SpO2 99%   BMI 24.60 kg/m²     Heart:  reg  Lungs:  ctab  Abd: + bs soft nontender  Extrem:  w/o edema    CBC with Differential:    Lab Results   Component Value Date/Time    WBC 6.6 12/07/2022 06:44 AM    RBC 4.44 12/07/2022 06:44 AM    HGB 13.5 12/07/2022 06:44 AM    HCT 40.9 12/07/2022 06:44 AM     12/07/2022 06:44 AM    MCV 92.1 12/07/2022 06:44 AM    MCH 30.4 12/07/2022 06:44 AM    MCHC 33.0 12/07/2022 06:44 AM    RDW 12.3 12/07/2022 06:44 AM    LYMPHOPCT 25.7 12/07/2022 06:44 AM    MONOPCT 6.5 12/07/2022 06:44 AM    BASOPCT 0.5 12/07/2022 06:44 AM    MONOSABS 0.43 12/07/2022 06:44 AM    LYMPHSABS 1.69 12/07/2022 06:44 AM    EOSABS 0.29 12/07/2022 06:44 AM    BASOSABS 0.03 12/07/2022 06:44 AM     CMP:    Lab Results   Component Value Date/Time     12/06/2022 02:45 AM    K 4.2 12/06/2022 02:45 AM     12/06/2022 02:45 AM    CO2 25 12/06/2022 02:45 AM    BUN 15 12/06/2022 02:45 AM    CREATININE 1.1 12/06/2022 02:45 AM    GFRAA >60 09/15/2022 09:32 AM    LABGLOM 57 12/06/2022 02:45 AM    GLUCOSE 107 12/06/2022 02:45 AM    PROT 6.5 12/06/2022 02:45 AM    LABALBU 3.6 12/06/2022 02:45 AM    CALCIUM 9.0 12/06/2022 02:45 AM    BILITOT 0.3 12/06/2022 02:45 AM    ALKPHOS 86 12/06/2022 02:45 AM    AST 21 12/06/2022 02:45 AM    ALT 21 12/06/2022 02:45 AM     Warfarin PT/INR:    Lab Results   Component Value Date    INR 1.2 12/04/2022    PROTIME 13.0 (H) 12/04/2022       Assessment:    Principal Problem:    Acute saddle pulmonary embolism (HCC)  Active Problems:    Pancreatic cyst  Resolved Problems:    * No resolved hospital problems.  *  Dvt left leg    Plan:  Dc planning increase activity        Venice Lambert DO  7:39 AM  12/7/2022

## 2022-12-07 NOTE — PROGRESS NOTES
Ambulating pulsox  Lying Spo2 94 HR 80 Room air  Sitting Spo2 96 HR 86 Room air  Ambulation 15 ft Spo2 94 HR 96 Room air  No additional oxygen required when ambulating or recovering. Tomeka Nice RN

## 2022-12-07 NOTE — CARE COORDINATION
Reviewed chart, -ortho's, no need for home O2. Plan is home at discharge, with spouse transport. Pt remains on IVF. Alexandre Marquez, MSW, LSW

## 2022-12-07 NOTE — PATIENT CARE CONFERENCE
[facility] Quality Flow/Interdisciplinary Rounds Progress Note        Quality Flow Rounds held on December 7, 2022    Disciplines Attending:  Bedside Nurse, , , and Nursing Unit Leadership    Alin Crook was admitted on 12/3/2022  3:33 PM    Anticipated Discharge Date:  Expected Discharge Date: 12/08/22    Disposition:    Efraín Score:  Efraín Scale Score: 21    Readmission Risk              Risk of Unplanned Readmission:  11           Discussed patient goal for the day, patient clinical progression, and barriers to discharge.   The following Goal(s) of the Day/Commitment(s) have been identified:  Labs - Report Results      Yumi Quinones RN  December 7, 2022

## 2022-12-07 NOTE — PROGRESS NOTES
This nurse called answering service and left message for call back to Dr. Rianna Graves him that ambulating pulse ox and ortho BP's are done. Wondering about dc. Waiting for call back.

## 2022-12-07 NOTE — PROGRESS NOTES
Occupational Therapy  OCCUPATIONAL THERAPY INITIAL EVALUATION     Yissel Aldana Hipbone [address]  [address] [place], [place]      MIAT:27/3/2086                                                Patient Name: Gretta Otto  MRN: [id number]  : 1960  Room: [id number] [address]    Referring Provider: Venice Lambert DO  Specific Provider Orders/Date: OT eval and treat 22     Diagnosis: Pancreatic mass [K86.89]  Acute saddle pulmonary embolism, unspecified whether acute cor pulmonale present [facility]) [I26.92]  Bilateral pulmonary embolism ([facility] [address] [I26.99]   Pt admitted to hospital on 12/3/22 for SOB, chest tightness      Pertinent Medical History:  has a past medical history of Abnormal Pap smear of cervix, Arthritis, Chronic back pain, Depression, GALEANA (dyspnea on exertion), Gastritis, GERD (gastroesophageal reflux disease), Hiatal hernia, Osteoarthritis, Palpitations, Partial tear of rotator cuff, PONV (postoperative nausea and vomiting), Shingles, Syncope, and TMJ (dislocation of temporomandibular joint).        Precautions:  Fall Risk, B PE, L LE DVT (+anticoagulation)    Assessment of current deficits    [x] Functional mobility  [x]ADLs  [x] Strength               []Cognition    [x] Functional transfers   [x] IADLs         [x] Safety Awareness   [x]Endurance    [] Fine Coordination              [x] Balance      [] Vision/perception   []Sensation     []Gross Motor Coordination  [] ROM  [] Delirium                   [] Motor Control     OT PLAN OF CARE   OT POC based on physician orders, patient diagnosis and results of clinical assessment    Frequency/Duration 1-3 days/wk for 2 weeks PRN   Specific OT Treatment Interventions to include:   * Instruction/training on adapted ADL techniques and AE recommendations to increase functional independence within precautions       * Training on energy conservation strategies, correct breathing pattern and techniques to improve independence/tolerance for self-care routine  * Functional transfer/mobility training/DME recommendations for increased independence, safety, and fall prevention  * Patient/Family education to increase follow through with safety techniques and functional independence  * Recommendation of environmental modifications for increased safety with functional transfers/mobility and ADLs  * Therapeutic exercise to improve motor endurance, ROM, and functional strength for ADLs/functional transfers  * Therapeutic activities to facilitate/challenge dynamic balance, stand tolerance for increased safety and independence with ADLs  * Therapeutic activities to facilitate gross/fine motor skills for increased independence with ADLs      Recommended Adaptive Equipment: TBD     Home Living: Pt lives with spouse in 1 floor home (+basement). 3 EMIGDIO, 0 handrail  Bathroom, bedroom and laundry on 1st floor.  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    Pain Level: Pt c/o no pain this session    Cognition: A&O: 4/4; Follows 1-2 step directions   Memory:  good    Sequencing:  good    Problem solving:  fair +   Judgement/safety:  fair +     Functional Assessment:  AM-PAC Daily Activity Raw Score: 19/24   Initial Eval Status  Date: 12/7/22 Treatment Status  Date: STGs = LTGs  Time frame: 10-14 days   Feeding Independent   -   Grooming Stand by Assist   Modified Granite    UB Dressing Supervision   Donned/doffed gown while seated EOB  Modified Granite    LB Dressing Contact Guard Assist   Modified Granite    Bathing Contact Guard Assist  Modified Granite    Toileting Stand by Assist   Modified Granite    Bed Mobility  Supine to sit: Supervision   Sit to supine: Supervision   Supine to sit: Independent   Sit to supine: Independent    Functional Transfers Stand by Assist   Independent   Functional Mobility Contact Guard Assist w/o AD  Slow gait speed, guarded  In room and hallway  Independent   Balance Sitting:     Static:  Independent    Dynamic:Supervision  Standing: SBA><CGA, no AD     Activity Tolerance Fair  During/post ambulation task, pt c/o mild SOB. Reinforced rest breaks and pursed lip breathing. O2 sat=^95%  Good   Visual/  Perceptual Glasses: yes                  Hand Dominance R   AROM (PROM) Strength Additional Info:    RUE  WFL 4/5 good  and wfl FMC/dexterity noted during ADL tasks       LUE WFL 4/5 good  and wfl FMC/dexterity noted during ADL tasks       Vitals:   BP at rest (supine): 103/65 HR at rest and w/ activity: 80s to 100 bpm   BP seated EOB: 107/68 Spo2 at rest and during/post activity: ^95% on room air  C/o mild SOB during ambulation task   BP standin/74    No c/o dizziness or lightheadedness at rest and w/ activity. **Above discussed w/ RN following session. Hearing: WFL  Sensation:  No c/o numbness or tingling  Tone: WFL   Edema: none noted    Comments: Upon arrival patient lying in bed. Therapist educated pt on role of OT. RN clearance. At end of session, patient lying in bed (per pt request) with call light and phone within reach, all lines and tubes intact. Overall patient demonstrated decreased independence and safety during completion of ADL/functional transfer/mobility tasks. Pt would benefit from continued skilled OT to increase safety and independence with completion of ADL/IADL tasks for functional independence and quality of life.     Treatment: OT treatment provided this date includes: Facilitation of bed mobility (education/cues for body mechanics), unsupported sitting balance (addressing posture, weight shifting, dynamic reaching to prep for ADL's), functional transfers (various surfaces w/ education/cues for safety/hand placement), standing tolerance tasks (addressing posture, balance and activity tolerance while incorporating light functional reaching impacting ADLs and functional activity) and functional ambulation tasks (w/ education/skilled cuing on hand placement, posture, body mechanics, energy conservation techniques and safety. Slow, guarded gait speed). Therapist facilitated self-care retraining: UB/LB self-care tasks (thorough education on energy conservation techniques and safety awareness). Also facilitated discussion on strategies to conserve energy while maintaining good safety awareness for home ADL's/IADL's - all pt/spouse's questions answered. Skilled monitoring of HR, O2 sats and pts response to treatment. Rehab Potential: Good for established goals     Patient / Family Goal: not stated      Patient and/or family were instructed on functional diagnosis, prognosis/goals and OT plan of care. Demonstrated good understanding. Eval Complexity: Low    Time In: 13:58  Time Out: 14:40  Total Treatment Time: 24 minutes    Min Units   OT Eval Low 97165  X  1   OT Eval Medium 49237      OT Eval High 79102      OT Re-Eval [id number]       Therapeutic Ex 43635       Therapeutic Activities 06390  15  1   ADL/Self Care 20522  9  1   Orthotic Management 07591       Manual 07112     Neuro Re-Ed 73798       Non-Billable Time          Evaluation Time additionally includes thorough review of current medical information, gathering information on past medical history/social history and prior level of function, interpretation of standardized testing/informal observation of tasks, assessment of data and development of plan of care and goals.         SHEREEN CassidyR/L #[id number]

## 2022-12-08 ENCOUNTER — CARE COORDINATION (OUTPATIENT)
Dept: OTHER | Facility: CLINIC | Age: 62
End: 2022-12-08

## 2022-12-08 NOTE — CARE COORDINATION
Care Transitions Outreach Attempt    Call within 2 business days of discharge: Yes   Attempted to reach patient for transitions of care follow up. Unable to reach patient. Patient: Cyndi Amaya Patient : 1960 MRN: [id number]    Last Discharge  Street       Date Complaint Diagnosis Description Type Department Provider    12/3/22 Shortness of Breath; Chest Pain Acute saddle pulmonary embolism, unspecified whether acute cor pulmonale present ([facility] [address] . .. ED to Hosp-Admission (Discharged) (TRANSFER) SAGAR 6SE Cullen Levine 4, DO; Miguel Able. .. Was this an external facility discharge? No Discharge Facility: [facility]    Noted following upcoming appointments from discharge chart review:   [facility] follow up appointment(s):   Future Appointments   Date Time Provider Mis Walker   12/15/2022  8:50 AM Yury Gonzales  [address]   12/15/2022  2:00 PM SAGAR AGUILAR [facility] RM 1 SAGAR AGUILAR [facility] Radiolo   2023 11:00 AM RUKHSANA Wood CNM     Non-[facility] follow up appointment(s): unknown    ACM attempted to reach patient for introduction to Associate Care Management related to admission for PE. HIPAA compliant message left requesting a return phone call. Will attempt to outreach patient again.

## 2022-12-08 NOTE — CARE COORDINATION
[facility] Care Transitions Initial Follow Up Call    Call within 2 business days of discharge: Yes    Care Transition Nurse contacted the patient by telephone to perform post hospital discharge assessment. Verified name and  with patient as identifiers. Provided introduction to self, and explanation of the Care Transition Nurse role. Patient: Mir Tavera Patient : 1960   MRN: [id number]  Reason for Admission: Acute saddle pulmonary embolism  Discharge Date: 22 RARS: Readmission Risk Score: 8.6      Last Discharge  Street       Date Complaint Diagnosis Description Type Department Provider    12/3/22 Shortness of Breath; Chest Pain Acute saddle pulmonary embolism, unspecified whether acute cor pulmonale present ([facility] [address] . .. ED to Hosp-Admission (Discharged) (TRANSFER) SAGAR [address] Cullen Levine 4, DO; Jolly Lamar. .. Was this an external facility discharge? No Discharge Facility: [address] [place] [address] Road to be reviewed by the provider   Additional needs identified to be addressed with provider: No  none               Method of communication with provider: none. Spoke with patient for initial care transition call post hospital discharge. Med review completed. Patient's spouse picked up Eliquis today from pharmacy. Discussed instructions for Eliquis 5 mg x 2 tabs bid x 7 days then 5 mg bid. Due to Eliquis, bleeding precautions reviewed with patient. Patient denies any abnormal or uncontrolled bleeding. Patient instructed to report any abnormal bleeding and to call 911 for any uncontrolled bleeding or injury to head. Patient verbalized understanding. 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 Patient denies palpitations, chest tightness and shortness of breath at this time. Patient reports \"mild\" tachycardia if patient is on feet too long.   Patient reports tachycardia resolves quickly and patient is trying to \"take it easy. \"  Patient reports a little bit of dizziness last night. Discussed changing positions slowly. Patient is hydrating with milk, water and juice. Patient states urine color was okay last night, didn't assess today. Patient states she is eating. Patient declines assistance with scheduling follow up appts with Dr Justina Dang, Dr Donovan Bhatia and Dr. Vester Babinski. Per EMR, patient to have VQ in January prior to follow up with Dr. Patrica Glover. Patient to request order for VQ when scheduling follow up. Patient reports Eliquis copay $100. This AC to outreach [employer] [place] with coupon to assist patient with receiving refund. Discussed utilizing [facility] [address] [facility] for future fills x 90 day supply. Copay for Eliquis x 90 days $30 thru [employer]. Patient to be updated. Patient denies anxiety at this time as well as issues with mental health. 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 Family and friends are assisting at this time. Patient denies any further needs, questions, or concerns at this time other than those being addressed. Patient is agreeable to future follow up calls. This AC outreluna Steward at [facility] and provided coupon information for free 30 day. Per Nichelle, patient may obtain refund for $100 copay. Attempted to notify patient of refund at [facility] for Eliquis. Patient's spouse answered phone. Patient's spouse on HIPAA form, notified spouse to present to prescription  line to receive refund of $100 for copay for Eliquis. Care Transition Nurse reviewed discharge instructions, medical action plan, and red flags with patient who verbalized understanding. The patient was given an opportunity to ask questions and does not have any further questions or concerns at this time. Were discharge instructions available to patient? Yes.  Reviewed appropriate site of care based on symptoms and resources available to patient including: PCP  Specialist  When to call [address] . The patient agrees to contact the PCP office for questions related to their healthcare. Medication reconciliation was performed with patient, who verbalizes understanding of administration of home medications. Medications reviewed, 1111F entered: N/A    Prior to Visit Medications    Medication Sig Taking?  Authorizing Provider   CALCIUM-MAGNESIUM-VITAMIN D PO Take by mouth daily Yes Historical Provider, MD   Alum & Mag Hydroxide-Simeth (MYLANTA PO) Take by mouth 3 times daily (before meals) Yes Historical Provider, MD   apixaban (ELIQUIS) 5 MG TABS tablet 2 tabs twice a day for 7 days then 1 tab twice a day orally Yes Dacia Hernandez,    omeprazole (PRILOSEC OTC) 20 MG tablet Take 20 mg by mouth daily OTC Yes Historical Provider, MD   Levocetirizine Dihydrochloride (XYZAL PO) Take by mouth daily as needed Yes Historical Provider, MD   b complex vitamins capsule Take 1 capsule by mouth daily Last dose 9-6-16 Yes Historical Provider, MD   therapeutic multivitamin-minerals (THERAGRAN-M) tablet Take 1 tablet by mouth daily Last dose 9-6-16 Yes Historical Provider, MD   Triamcinolone Acetonide (NASACORT ALLERGY 24HR NA) 1 Inhaler by Nasal route Indications: prn   Historical Provider, MD     Non-face-to-face services provided:  Scheduled appointment with PCP-Patient declined assistance scheduling follow up with Dr. Prince Cheema  Scheduled appointment with Specialist-Patient to schedule follow up with Dr. Koko Bello and Dr. Muna Gilbert and reviewed discharge summary and/or continuity of care documents  Reviewed and followed up on pending diagnostic tests and treatments-Patient aware of need for VQ scan in January per EMR    Offered patient enrollment in the Remote Patient Monitoring (RPM) program for in-home monitoring: NA.    Care Transitions 24 Hour Call    Schedule Follow Up Appointment with PCP: Declined  Do you have a copy of your discharge instructions?: Yes  Do you have all of your prescriptions and are they filled?: No  Have you been contacted by a [facility] [address] [facility]?: No  Have you scheduled your follow up appointment?: No  Do you feel like you have everything you need to keep you well at home?: Yes  Care Transitions Interventions   Medication Assistance Program: Completed            Follow Up  Future Appointments   Date Time Provider Mis Walker   12/15/2022  8:50 AM Sylvie Goldmann,  [address]   12/15/2022  2:00 PM SAGAR AGUILAR [facility] RM 1 SAGAR AGUILAR [facility] Radiolo   8/4/2023 11:00 AM RUKHSANA Tavares CNM [facility] Transition Nurse provided contact information. Plan for follow-up call in 3-5 days based on severity of symptoms and risk factors.   Plan for next call: symptom management-dizziness, shortness of breath, chest tightness, palipations, tachycardia, bleeding  self management-blood pressure  follow-up appointment-Scheduled follow up appts  medication management-Refund for Eliquis, rx for Eliquis to [address] [place] [address] DEIRDRE Mitchell

## 2022-12-09 LAB
ANTICARDIOLIPIN IGA ANTIBODY: <10 APL
ANTICARDIOLIPIN IGG ANTIBODY: <10 GPL
BETA-2 GLYCOPROTEIN 1 IGG ANTIBODY: <10 SGU
BETA-2 GLYCOPROTEIN 1 IGM ANTIBODY: <10 SMU
CARDIOLIPIN AB IGM: <10 MPL
PLASMINOGEN ACT INHIBITOR-1: ABNORMAL
PLASMINOGEN ACT. INHIBITOR-1 (PAI-1) SPECIMEN: ABNORMAL

## 2022-12-10 LAB
FACTOR V LEIDEN: NEGATIVE
MTHFR BY PCR SPECIMEN: NORMAL
MTHFR INTERPRETATION: NORMAL
MTHFR MUTATION A1298C: NEGATIVE
MTHFR MUTATION C677T: NORMAL
PROTEIN S ANTIGEN, FREE: 96 % (ref 55–123)
PROTHROMBIN G20210A MUTATION: NEGATIVE
PT PCR SPECIMEN: NORMAL
SPECIMEN: NORMAL

## 2022-12-11 ASSESSMENT — ENCOUNTER SYMPTOMS
CONSTIPATION: 0
SORE THROAT: 0
RHINORRHEA: 0
STRIDOR: 0
SINUS PRESSURE: 0
CHOKING: 0
COUGH: 0
BACK PAIN: 0
NAUSEA: 0
ABDOMINAL PAIN: 0
CHEST TIGHTNESS: 1
VOMITING: 0
WHEEZING: 0
SHORTNESS OF BREATH: 1
DIARRHEA: 0
BLOOD IN STOOL: 0

## 2022-12-15 ENCOUNTER — CARE COORDINATION (OUTPATIENT)
Dept: OTHER | Facility: CLINIC | Age: 62
End: 2022-12-15

## 2022-12-15 ENCOUNTER — OFFICE VISIT (OUTPATIENT)
Dept: ORTHOPEDIC SURGERY | Age: 62
End: 2022-12-15

## 2022-12-15 ENCOUNTER — HOSPITAL ENCOUNTER (OUTPATIENT)
Dept: GENERAL RADIOLOGY | Age: 62
Discharge: HOME | End: 2022-12-17
Payer: COMMERCIAL

## 2022-12-15 VITALS — HEIGHT: 66 IN | BODY MASS INDEX: 24.59 KG/M2 | WEIGHT: 153 LBS

## 2022-12-15 DIAGNOSIS — Z12.31 BREAST CANCER SCREENING BY MAMMOGRAM: ICD-10-CM

## 2022-12-15 DIAGNOSIS — M75.111 NONTRAUMATIC INCOMPLETE TEAR OF RIGHT ROTATOR CUFF: Primary | ICD-10-CM

## 2022-12-15 DIAGNOSIS — M25.511 ACUTE PAIN OF RIGHT SHOULDER: ICD-10-CM

## 2022-12-15 DIAGNOSIS — M25.561 ACUTE PAIN OF RIGHT KNEE: ICD-10-CM

## 2022-12-15 PROCEDURE — 77063 BREAST TOMOSYNTHESIS BI: CPT

## 2022-12-15 NOTE — PROGRESS NOTES
Follow Up Visit     Alin Crook returns today for follow up visit regarding her right shoulder and right knee. Her last visit we injected her shoulder and started her in physical therapy. Since that time she was admitted to the hospital with bilateral pulmonary embolus. She is now taking Eliquis for treatment. Her shoulder pain improved with the injection. She really has not started doing her exercises due to her medical issues. Her knee pain is greatly improved. Physical Exam:     Height: 5' 6.25\" (1.683 m), Weight: 153 lb (69.4 kg)    General: Alert and oriented x3, no acute distress  Cardiovascular/pulmonary: No labored breathing, peripheral perfusion intact  Musculoskeletal:    Right shoulder: Atraumatic. She has near full range of motion in forward flexion abduction and external rotation. She has limited internal rotation to L4. She has some weakness with resisted external and internal rotation. She has 4-5 strength with resisted shoulder abduction. She has positive impingement sign. She is tender palpation over AC joint. Her elbow range of motion is full and unrestricted without any pain         Controlled Substances Monitoring:      Imaging:  No new imaging today      Assessment:   Right shoulder partial-thickness rotator cuff tearing with subacromial bursitis and AC joint arthritis  Right knee mild medial and patellofemoral joint arthritis      Plan:   -Due to her new medical condition and pulmonary embolus we regressed and surgery. We have highlighted the important exercises to do specifically shoulder rotation exercises along with scapular retraining. She understand that she is going to continue these exercises at home. She can come back in 2 months if she wants another injection if her shoulder flares up. We will at least wait 1 year prior to elective surgery due to her blood clots. She understands and wishes to proceed. She is happy with this treatment plan.   She can follow-up as needed.         Arthur Rodriguez DO   Orthopaedic Surgery   12/15/22  8:58 AM

## 2022-12-15 NOTE — CARE COORDINATION
[address] Care Transitions Follow Up Call    Care Transition Nurse contacted the patient by telephone to follow up after admission on 12/3/22. Verified name and  with patient as identifiers. Patient: Tamara Tan  Patient : 1960   MRN: [id number]  Reason for Admission: Acute Saddle PE  Discharge Date: 22 RARS: Readmission Risk Score: 8.6      Needs to be reviewed by the provider   Additional needs identified to be addressed with provider: No  none             Method of communication with provider: none. Spoke with patient for follow up care transition call. Patient reports shortness of breath this morning due to having to get ready and attend doctor appt this morning. Patient reports shortness of breath improves throughout the day. Patient denies shortness of breath if able to perform small tasks and then rest.  Patient reports trying to take it easy. Patient c/o fatigue. Patient denies dizziness and chest tightness. Patient reports red noted on stool today. Patient reports ate strawberry jello yesterday. Also reports diarrhea yesterday and active hemorrhoid. Patient to monitor and will report to Dr. Daquan Epstein at appt on 22. Patient reports hydrating and eating 3 meals, not large, daily; although due to diarrhea, patient did not eat a lot yesterday. Per patient, spouse received refund for Eliquis at [facility]. Discussed copay of future fills of Eliquis, notified Ángela Calles will utilize coupon for $10/month copay. Patient denies any further needs, questions, or concerns at this time. Patient is agreeable to future follow up calls. Addressed changes since last contact:  medications-Decreased to Eliquis 5 mg po bid  Discussed follow-up appointments. If no appointment was previously scheduled, appointment scheduling offered: Patient to follow up with Dr. Daquan Epstein on 22. Is follow up appointment scheduled within 7 days of discharge?  No.    Follow Up  Future Appointments   Date Time Provider Mis Denise   12/15/2022  2:00 PM SEYZ ABDU DENISSE RM 1 SEYZ ABDU [facility] Radiolo   12/20/2022  2:00 PM Holly Woods MD MED ONC [facility]   12/20/2022  2:00 PM SEYZ MED ONC FAST TRACK 2 SEYZ Med Onc [facility]   12/30/2022  8:45 AM Ta Tolliver MD SE HPB Surg [facility]   1/13/2023  1:00 PM Jacinto Spann MD [facility] PULMONAR   8/4/2023 11:00 AM RUKHSANA Wood - WALKER AFLKOULIANOS AFL Adelia Oscar     Non-[facility] follow up appointment(s): Dr. Justina Dang 12/19/22    Care Transition Nurse reviewed red flags with patient and discussed any barriers to care and/or understanding of plan of care after discharge. Discussed appropriate site of care based on symptoms and resources available to patient including: PCP  Specialist  When to call [address] The patient agrees to contact the PCP office for questions related to their healthcare. Advance Care Planning:   reviewed and current. Patients top risk factors for readmission: caregiver stress, depression, and medical condition-saddle pulmonary embolism     Care Transitions Subsequent and Final Call    Subsequent and Final Calls  Do you have any ongoing symptoms?: Yes  Onset of Patient-reported symptoms: In the past 7 days  Patient-reported symptoms: Fatigue, Shortness of Breath  Have your medications changed?: Yes  Patient Reports: Decreased to Eliquis 5 mg po bid  Do you have any questions related to your medications?: No  Do you currently have any active services?: No  Do you have any needs or concerns that I can assist you with?: No  Identified Barriers: Other, Stress  Care Transitions Interventions  No Identified Needs   Medication Assistance Program: Completed     Other Interventions:             Care Transition Nurse provided contact information for future needs. Plan for follow-up call in 5-7 days based on severity of symptoms and risk factors.   Plan for next call: symptom management-chest tightness, shortness of breath, bleeding, dizziness, fatigue  follow-up appointment-Completed appt with Dr. Nuvia Fam  medication management-Medication changes, compliance, questions    Shayy Alva RN

## 2022-12-20 ENCOUNTER — OFFICE VISIT (OUTPATIENT)
Dept: ONCOLOGY | Age: 62
End: 2022-12-20
Payer: COMMERCIAL

## 2022-12-20 ENCOUNTER — HOSPITAL ENCOUNTER (OUTPATIENT)
Dept: INFUSION THERAPY | Age: 62
Discharge: HOME | End: 2022-12-20

## 2022-12-20 VITALS
DIASTOLIC BLOOD PRESSURE: 53 MMHG | HEIGHT: 66 IN | TEMPERATURE: 97.7 F | SYSTOLIC BLOOD PRESSURE: 110 MMHG | OXYGEN SATURATION: 97 % | BODY MASS INDEX: 25.55 KG/M2 | HEART RATE: 68 BPM | WEIGHT: 159 LBS

## 2022-12-20 DIAGNOSIS — I26.92 ACUTE SADDLE PULMONARY EMBOLISM WITHOUT ACUTE COR PULMONALE (HCC): Primary | ICD-10-CM

## 2022-12-20 PROCEDURE — 99214 OFFICE O/P EST MOD 30 MIN: CPT

## 2022-12-20 PROCEDURE — 99205 OFFICE O/P NEW HI 60 MIN: CPT | Performed by: INTERNAL MEDICINE

## 2022-12-20 NOTE — PROGRESS NOTES
701  [facility] MED ONCOLOGY  [address] [facility] [address] [id number]  Dept: Kelly Kang: [phone]  Attending Consult Note      Reason for Visit:   Bilateral PE. Referring Physician:  Razia Sanchez DO    PCP:  Alix Del Valle MD    History of Present Illness:     Mrs. Dameon Bravo is a 70-year-old lady with a past medical history significant for chronic back pain, GERD, LAURIE, depression, who had presented to the ED on 12/3/2022 with worsening shortness of breath for 2 weeks, was associated with palpitations, chest CTA had revealed bilateral pulmonary emboli, with evidence of right heart strain, groundglass opacities bilaterally, likely secondary to bilateral pulmonary emboli, cystic lesions at the level of pancreatic head and pancreatic body measuring up to 1.4 cm. A stat 2D echocardiogram was done which showed normal RV size and function. She had increased pulmonary pressures. The patient was started on heparin drip. subsequently had MRI of the abdomen with and without contrast done, revealing endings consistent with sidebranch IPMN configuration. Bilateral lower extremity venous ultrasound had revealed DVT in the left popliteal vein, the tibioperoneal trunk, which is nonocclusive, occlusive thrombosis in the left peroneal veins. COVID-19 testing was negative. Bilateral screening mammogram from 11/24/2021 was negative for malignancy. Testing for inherited thrombophilia was ordered. The patient had a 5 to 6-hour car trip to [place] on November 8, returned on November 11, 5 to 6 hours each direction no recent surgeries. She received the COVID-vaccine on November 2. She is not on hormonal therapy. Her paternal grandmother had DVTs and PEs, her paternal cousin had a provoked PE. No history of miscarriages. The patient was discharged home on Eliquis, she is doing better, still with some chest pain, shortness of breath had improved.     Review of Systems;  CONSTITUTIONAL: No fever, chills. Decreased appetite and energy level. ENMT: Eyes: No diplopia; Nose: No epistaxis. Mouth: No sore throat. RESPIRATORY: No hemoptysis, pos for shortness of breath, no cough. CARDIOVASCULAR: No cardiac type chest pain, palpitations. GASTROINTESTINAL: pos for nausea, no vomiting, abdominal pain, pos for diarrhea and constipation. GENITOURINARY: No dysuria, urinary frequency, hematuria. NEURO: No syncope, presyncope, headache.   Remainder:  ROS NEGATIVE    Past Medical History:      Diagnosis Date    Abnormal Pap smear of cervix 2011 apx    Arthritis     cervical    Chronic back pain     Depression     GALEANA (dyspnea on exertion)     DVT (deep venous thrombosis) (HCC)     Left leg    Gastritis     GERD (gastroesophageal reflux disease)     Hiatal hernia 03/11/2015    Osteoarthritis     Palpitations     Partial tear of rotator cuff     PE (pulmonary thromboembolism) ([address] 12/03/2022    Bilateral lungs    PONV (postoperative nausea and vomiting)     Shingles 2007    Syncope 2012    TMJ (dislocation of temporomandibular joint)      Patient Active Problem List   Diagnosis    GALEANA (dyspnea on exertion)    GERD (gastroesophageal reflux disease)    Arthritis    Syncope    Palpitations    LUQ pain    Family history of colon cancer    Gastritis    Hiatal hernia    Cervical dysplasia HX LGSIL 2013 and high risk HPV 2014    Postmenopausal bleeding    Abnormal ultrasound of endometrium    Cervical stenosis (uterine cervix)    Acute saddle pulmonary embolism ([address]    Pancreatic cyst        Past Surgical History:      Procedure Laterality Date    APPENDECTOMY  1970    CERVICAL POLYP REMOVAL      CHOLECYSTECTOMY, LAPAROSCOPIC  2003    COLONOSCOPY  03/11/2015    Dr. Corinne Krebs  2011    ENDOSCOPY, COLON, DIAGNOSTIC  2003, 2015 2015 with colonoscopy  [facility]    HYSTEROSCOPY  09/08/2016    and D&C     LAPAROSCOPY     ovarian cysts    LASIK      TONSILLECTOMY AND ADENOIDECTOMY  1985    WISDOM TOOTH EXTRACTION  1983    x 4       Family History:  Family History   Problem Relation Age of Onset    Kidney Disease Mother     Heart Attack Mother     Arthritis Mother     Heart Disease Mother     High Blood Pressure Mother     High Cholesterol Mother     Stroke Mother     Vision Loss Father     Atrial Fibrillation Father     Arthritis Father     Heart Disease Father     Asthma Brother     Cancer Maternal Aunt         pancreatic cancer    Stroke Maternal Aunt     Arthritis Maternal Aunt     Asthma Maternal Uncle     Stroke Maternal Uncle     Arthritis Paternal Aunt     High Cholesterol Paternal Aunt     Cancer Paternal Aunt     Cancer Paternal Uncle        Medications:  Reviewed and reconciled. Social History:  Social History     Socioeconomic History    Marital status:      Spouse name: Not on file    Number of children: Not on file    Years of education: Not on file    Highest education level: Not on file   Occupational History    Not on file   Tobacco Use    Smoking status: Former     Types: Cigarettes     Quit date: 1981     Years since quittin.3    Smokeless tobacco: Never    Tobacco comments:     only smoked x 1 year in college   Vaping Use    Vaping Use: Never used   Substance and Sexual Activity    Alcohol use: No     Alcohol/week: 0.0 standard drinks     Comment: rarely    Drug use: No    Sexual activity: Yes     Partners: Male     Birth control/protection: Post-menopausal   Other Topics Concern    Not on file   Social History Narrative    Not on file     Social Determinants of Health     Financial Resource Strain: Not on file   Food Insecurity: Not on file   Transportation Needs: Not on file   Physical Activity: Not on file   Stress: Not on file   Social Connections: Not on file   Intimate Partner Violence: Not on file   Housing Stability: Not on file       Allergies:   Allergies   Allergen Reactions    Other Anaphylaxis     Powder in exam gloves    Sucralfate Rash       Physical Exam:  BP (!) 110/53   Pulse 68   Temp 97.7 °F (36.5 °C)   Ht 5' 6.25\" (1.683 m)   Wt 159 lb (72.1 kg)   LMP 01/01/2006   SpO2 97%   BMI 25.47 kg/m²   GENERAL: Alert, oriented x 3, not in acute distress. HEENT: PERRLA; EOMI. Oropharynx clear. NECK: Supple. No palpable cervical or supraclavicular lymphadenopathy. LUNGS: Good air entry bilaterally. No wheezing, crackles or rhonchi. CARDIOVASCULAR: Regular rate. No murmurs, rubs or gallops. ABDOMEN: Soft. Non-tender, non-distended. Positive bowel sounds. EXTREMITIES: Without clubbing, cyanosis, or edema. NEUROLOGIC: No focal deficits. ECOG PS 0       Impression/Plan:    Mrs. Flora Moore is a 75-year-old lady with a past medical history significant for chronic back pain, GERD, LAURIE, depression, who had presented to the ED on 12/3/2022 with worsening shortness of breath for 2 weeks, was associated with palpitations, chest CTA had revealed bilateral pulmonary emboli, with evidence of right heart strain, groundglass opacities bilaterally, likely secondary to bilateral pulmonary emboli, cystic lesions at the level of pancreatic head and pancreatic body measuring up to 1.4 cm. A stat 2D echocardiogram was done which showed normal RV size and function. She had increased pulmonary pressures. The patient was started on heparin drip. subsequently had MRI of the abdomen with and without contrast done, revealing endings consistent with sidebranch IPMN configuration. Bilateral lower extremity venous ultrasound had revealed DVT in the left popliteal vein, the tibioperoneal trunk, which is nonocclusive, occlusive thrombosis in the left peroneal veins. COVID-19 testing was negative. Bilateral screening mammogram from 11/24/2021 was negative for malignancy.   She is scheduled for a repeat mammogram, last coloscopy was in 2050, was recommended repeat coloscopy after 10 years, testing for inherited thrombophilia was ordered. The patient had a 5 to 6-hour car trip to [place] on November 8, returned on November 11, 5 to 6 hours each direction no recent surgeries. She received the COVID-vaccine on November 2. She is not on hormonal therapy. Her paternal grandmother had DVTs and PEs, her paternal cousin had a provoked PE. No history of miscarriages. The patient was diagnosed with left lower extremity DVT, and bilateral pulmonary emboli, the event was likely provoked by the long travel, and possibly the COVID 19 vaccine.   -Recommended testing for inherited thrombophilia and antiphospholipid antibody syndrome, results were reviewed with the patient, she does not have prothrombin gene or factor V Leiden gene mutations, no protein C, protein S or Antithrombin III deficiency, anticardiolipin and antibeta-2 glycoprotein antibodies are negative, will order lupus anticoagulant as the patient is no longer on heparin.  -The patient is up-to-date with age-appropriate screening. -CT scans of the chest and MRI of the abdomen without evidence of malignancy.  -She has IPMN, the patient was seen by Dr. Learta Collet, plan for repeat MRI in 6 months she is scheduled to be seen as outpatient.  -The patient is doing well clinically at this time, continue Eliquis, recommended anticoagulation for 1 year.  -Follow-up with Dr. Bigg Mckeon, plan for rescan in January.  -We will plan on having a left lower extremity ultrasound done the 1 year idalmis prior to discontinuing anticoagulation. RTC in 3 months. Thank you for allowing us to participate in the care of Mrs. Nnamdi Humphreys.   Jessica Good MD   HEMATOLOGY/MEDICAL [address] MED ONCOLOGY  [address] [place] [id number]  Dept: 71 Marlene Kang: [phone]

## 2022-12-20 NOTE — PROGRESS NOTES
Albertryder Polanco  1960 58 y.o. Referring Physician:     PCP: Carol Negrete MD    Vitals:    22 1407   BP: (!) 110/53   Pulse: 68   Temp: 97.7 °F (36.5 °C)   SpO2: 97%        Wt Readings from Last 3 Encounters:   22 159 lb (72.1 kg)   12/15/22 153 lb (69.4 kg)   22 153 lb 9.6 oz (69.7 kg)        Body mass index is 25.47 kg/m².           Chief Complaint:   Chief Complaint   Patient presents with    New Patient    Other     Bilat PE           LMP:     Age at first Menses: 15    : 0    Para: 0          Current Outpatient Medications:     CALCIUM-MAGNESIUM-VITAMIN D PO, Take by mouth daily, Disp: , Rfl:     Alum & Mag Hydroxide-Simeth (MYLANTA PO), Take by mouth 3 times daily (before meals), Disp: , Rfl:     apixaban (ELIQUIS) 5 MG TABS tablet, 2 tabs twice a day for 7 days then 1 tab twice a day orally, Disp: 74 tablet, Rfl: 0    omeprazole (PRILOSEC OTC) 20 MG tablet, Take 20 mg by mouth daily as needed OTC, Disp: , Rfl:     Levocetirizine Dihydrochloride (XYZAL PO), Take by mouth daily as needed, Disp: , Rfl:     b complex vitamins capsule, Take 1 capsule by mouth daily Last dose 16, Disp: , Rfl:     Triamcinolone Acetonide (NASACORT ALLERGY 24HR NA), 1 Inhaler by Nasal route Indications: prn , Disp: , Rfl:     therapeutic multivitamin-minerals (THERAGRAN-M) tablet, Take 1 tablet by mouth daily Last dose 16, Disp: , Rfl:        Past Medical History:   Diagnosis Date    Abnormal Pap smear of cervix  apx    Arthritis     cervical    Chronic back pain     Depression     GALEANA (dyspnea on exertion)     DVT (deep venous thrombosis) (HCC)     Left leg    Gastritis     GERD (gastroesophageal reflux disease)     Hiatal hernia 2015    Osteoarthritis     Palpitations     Partial tear of rotator cuff     PE (pulmonary thromboembolism) ([address] 2022    Bilateral lungs    PONV (postoperative nausea and vomiting)     Shingles 2007    Syncope 2012    TMJ (dislocation of since quittin.3    Smokeless tobacco: Never    Tobacco comments:     only smoked x 1 year in college   Substance and Sexual Activity    Alcohol use: No     Alcohol/week: 0.0 standard drinks     Comment: rarely    Drug use: No    Sexual activity: Yes     Partners: Male     Birth control/protection: Post-menopausal   Other Topics Concern    Not on file   Social History Narrative    Not on file     Social Determinants of Health     Financial Resource Strain: Not on file   Food Insecurity: Not on file   Transportation Needs: Not on file   Physical Activity: Not on file   Stress: Not on file   Social Connections: Not on file   Intimate Partner Violence: Not on file   Housing Stability: Not on file           Occupation: nurse  Retired:  YES: Patient is retired from [address] [place] [address] 27. REVIEW OF SYSTEMS: <<For Level 5, 10 or more systems>>     Pacemaker/Defibulator/ICD:  No    Mediport: No           FALLS RISK SCREENING ASSESSMENT    Instructions:  Assess the patient and [place] the appropriate indicators that are present for fall risk identification. Total the numbers circled and assign a fall risk score from Table 2.  Reassess patient at a minimum every 12 weeks or with status change. Assessment   Date  2022     1. Mental Ability: confusion/cognitively impaired No - 0       2. Elimination Issues: incontinence, frequency No - 0       3. Ambulatory: use of assistive devices (walker, cane, off-loading devices), attached to equipment (IV pole, oxygen) No - 0     4. Sensory Limitations: dizziness, vertigo, impaired vision No - 0       5. Age Less than 65 years - 0       6. Medication: diuretics, strong analgesics, hypnotics, sedatives, antihypertensive agents   No - 0   7. Falls:  recent history of falls within the last 3 months (not to include slipping or tripping)   No - 0   TOTAL 0    If score of 4 or greater was education given?  No       TABLE 2   Risk Score Risk Level Plan of Care   0-3 Little or  No Risk 1. Provide assistance as indicated for ambulation activities  2. Reorient confused/cognitively impaired patient  3. Call-light/bell within patient's reach  4. Chair/bed in low position, stretcher/bed with siderails up except when performing patient care activities  5. Educate patient/family/caregiver on falls prevention  6.  Reassess in 12 weeks or with any noted change in patient condition which places them at a risk for a fall   4-6 Moderate Risk 1. Provide assistance as indicated for ambulation activities  2. Reorient confused/cognitively impaired patient  3. Call-light/bell within patient's reach  4. Chair/bed in low position, stretcher/bed with siderails up except when performing patient care activities  5. Educate patient/family/caregiver on falls prevention  6. Falls risk precaution (Yellow sticker Level II) placed on patient chart   7 or   Higher High Risk 1. Place patient in easily observable treatment room  2. Patient attended at all times by family member or staff  3. Provide assistance as indicated for ambulation activities  4. Reorient confused/cognitively impaired patient  5. Call-light/bell within patient's reach  6. Chair/bed in low position, stretcher/bed with siderails up except when performing patient care activities  7. Educate patient/family/caregiver on falls prevention  8.   Falls risk precaution (Yellow sticker Level III) placed on patient chart                     Maya Richter RN

## 2022-12-21 ENCOUNTER — HOSPITAL ENCOUNTER (OUTPATIENT)
Age: 62
Discharge: HOME | End: 2022-12-21
Payer: COMMERCIAL

## 2022-12-21 DIAGNOSIS — I26.92 ACUTE SADDLE PULMONARY EMBOLISM WITHOUT ACUTE COR PULMONALE (HCC): ICD-10-CM

## 2022-12-21 LAB
BUN BLDV-MCNC: 19 MG/DL (ref 6–23)
CREAT SERPL-MCNC: 1 MG/DL (ref 0.5–1)
GFR SERPL CREATININE-BSD FRML MDRD: >60 ML/MIN/1.73

## 2022-12-21 PROCEDURE — 82565 ASSAY OF CREATININE: CPT

## 2022-12-21 PROCEDURE — 36415 COLL VENOUS BLD VENIPUNCTURE: CPT

## 2022-12-21 PROCEDURE — 85613 RUSSELL VIPER VENOM DILUTED: CPT

## 2022-12-21 PROCEDURE — 84520 ASSAY OF UREA NITROGEN: CPT

## 2022-12-22 ENCOUNTER — CARE COORDINATION (OUTPATIENT)
Dept: OTHER | Facility: CLINIC | Age: 62
End: 2022-12-22

## 2022-12-22 LAB — LUPUS ANTICOAG DVVT: NEGATIVE

## 2022-12-22 NOTE — CARE COORDINATION
Care Transitions Outreach Attempt    Patient: Paula Saunders Patient : 1960 MRN: [id number]    Last Discharge  [address]       Date Complaint Diagnosis Description Type Department Provider    12/3/22 Shortness of Breath; Chest Pain Acute saddle pulmonary embolism, unspecified whether acute cor pulmonale present ([facility] . .. ED to Hosp-Admission (Discharged) (TRANSFER) [facility] [address], DO; Gordon Roberson. .. Noted following upcoming appointments from discharge chart review:   [facility] follow up appointment(s):   Future Appointments   Date Time Provider Mis Walker   2022  8:45 AM Media Foil Benoit Mckeon MD [facility] S[facility]   2023  1:00 PM Karen Clayton MD [facility]   3/14/2023  2:00 PM [facility] MED ONC FAST TRACK 2 [facility] Med Onc [facility]   3/14/2023  2:15 PM Elder Tapia MD MED ONC [facility]   2023 11:00 AM RUKHSANA Wood - WALKER Iglesias     Non-[facility] follow up appointment(s): unknown    ACM attempted to reach patient for follow up call regarding admission for PE. HIPAA compliant message left requesting a return phone call at patients convenience. Will continue to follow.

## 2022-12-30 ENCOUNTER — TELEPHONE (OUTPATIENT)
Dept: HEMATOLOGY | Age: 62
End: 2022-12-30

## 2022-12-30 NOTE — TELEPHONE ENCOUNTER
Patient forgot about her appt today with Dr. Claribel Copeland so I rescheduled her for 1/13/23 at 11:45am at [facility] clinic at [facility]. She confirmed this new appt.     Electronically signed by Lianne Solomon RN on 12/30/2022 at 9:57 AM

## 2023-01-04 ENCOUNTER — HOSPITAL ENCOUNTER (OUTPATIENT)
Age: 63
Discharge: HOME | End: 2023-01-06
Payer: COMMERCIAL

## 2023-01-04 ENCOUNTER — TELEPHONE (OUTPATIENT)
Dept: SURGERY | Age: 63
End: 2023-01-04

## 2023-01-04 ENCOUNTER — HOSPITAL ENCOUNTER (OUTPATIENT)
Dept: NUCLEAR MEDICINE | Age: 63
Discharge: HOME | End: 2023-01-06
Payer: COMMERCIAL

## 2023-01-04 ENCOUNTER — HOSPITAL ENCOUNTER (OUTPATIENT)
Dept: GENERAL RADIOLOGY | Age: 63
Discharge: HOME | End: 2023-01-06
Payer: COMMERCIAL

## 2023-01-04 DIAGNOSIS — R06.09 DOE (DYSPNEA ON EXERTION): ICD-10-CM

## 2023-01-04 DIAGNOSIS — I26.92 ACUTE SADDLE PULMONARY EMBOLISM, UNSPECIFIED WHETHER ACUTE COR PULMONALE PRESENT (HCC): ICD-10-CM

## 2023-01-04 PROCEDURE — 3430000000 HC RX DIAGNOSTIC RADIOPHARMACEUTICAL: Performed by: RADIOLOGY

## 2023-01-04 PROCEDURE — 78582 LUNG VENTILAT&PERFUS IMAGING: CPT

## 2023-01-04 PROCEDURE — 71046 X-RAY EXAM CHEST 2 VIEWS: CPT

## 2023-01-04 PROCEDURE — A9540 TC99M MAA: HCPCS | Performed by: RADIOLOGY

## 2023-01-04 PROCEDURE — A9539 TC99M PENTETATE: HCPCS | Performed by: RADIOLOGY

## 2023-01-04 RX ORDER — KIT FOR THE PREPARATION OF TECHNETIUM TC 99M PENTETATE 20 MG/1
35 INJECTION, POWDER, LYOPHILIZED, FOR SOLUTION INTRAVENOUS; RESPIRATORY (INHALATION)
Status: COMPLETED | OUTPATIENT
Start: 2023-01-04 | End: 2023-01-04

## 2023-01-04 RX ADMIN — Medication 6 MILLICURIE: at 10:16

## 2023-01-04 RX ADMIN — KIT FOR THE PREPARATION OF TECHNETIUM TC 99M PENTETATE 35 MILLICURIE: 20 INJECTION, POWDER, LYOPHILIZED, FOR SOLUTION INTRAVENOUS; RESPIRATORY (INHALATION) at 09:49

## 2023-01-04 NOTE — TELEPHONE ENCOUNTER
First attempt made by MA to schedule pt with Dr. Barbara Otero for rectal bleeding per referral. MA left detailed voicemail requesting return call.      Electronically signed by Dejah Mullen MA on 1/4/2023 at 10:01 AM

## 2023-01-05 ENCOUNTER — CARE COORDINATION (OUTPATIENT)
Dept: OTHER | Facility: CLINIC | Age: 63
End: 2023-01-05

## 2023-01-05 NOTE — CARE COORDINATION
[facility] Care Transitions Follow Up Call    Care Transition Nurse contacted the patient by telephone to follow up after admission on 12/3/22. Verified name and  with patient as identifiers. Patient: Ryder Escobar  Patient : 1960   MRN: [id number]  Reason for Admission: Acute saddle pulmonary embolism  Discharge Date: 22 RARS: Readmission Risk Score: 8.6      Needs to be reviewed by the provider   Additional needs identified to be addressed with provider: No  none             Method of communication with provider: none. Spoke with patient for final care transition call. Patient denies shortness of breath, dizziness, chest pain. Patient reports carito blood on stool x 1 and pink when wiping x 1. Patient has follow up with Dr. Amy Antonio in February. Patient to continue to monitor and will outreach physician with increased occurrences/symptoms. Patient reports decreased fatigue. Patient is attending [employer], driving, getting outside, and eating healthy. Patient reports being a little forgetful. Patient reports received Eliquis refill from [facility] for $30/90 day. Patient has resumed caring for her father. Discussed mental health. Patient denies any further needs, questions, or concerns at this time. This ACM to sign off and resolve episode.     Addressed changes since last contact:  none    Follow Up  Future Appointments   Date Time Provider Mis Walker   2023 11:15 AM RUKHSANA Reese Che - WALKER Barrett   2023 11:45 AM Avril Aguilar MD [facility]   2023  1:00 PM Oneyda Little MD [facility]   2023  9:45 AM Deepthi Valdes MD [facility] Surgical [facility]   3/14/2023  2:00 PM [facility] MED ONC FAST TRACK 2 [facility] Med Onc [facility]   3/14/2023  2:15 PM Malena Moran MD MED ONC [facility]   2023 11:00 AM Hugh Washburn APRN - CNM [facility]     Non-[facility] follow up appointment(s): none noted    Care Transition Nurse reviewed medical action plan and red flags with patient and discussed any barriers to care and/or understanding of plan of care after discharge. Discussed appropriate site of care based on symptoms and resources available to patient including: PCP  Specialist  Benefits related nurse triage line  When to call [phone]. The patient agrees to contact the PCP office for questions related to their healthcare. Patients top risk factors for readmission: medical condition-s/p PE     Care Transitions Subsequent and Final Call    Subsequent and Final Calls  Do you have any ongoing symptoms?: No  Have your medications changed?: No  Do you have any questions related to your medications?: No  Do you currently have any active services?: No  Do you have any needs or concerns that I can assist you with?: No  Identified Barriers: Other, Stress  Care Transitions Interventions  No Identified Needs   Medication Assistance Program: Completed     Other Interventions:             Care Transition Nurse provided contact information for future needs. No further follow-up call indicated based on severity of symptoms and risk factors.     Micky Han RN

## 2023-01-11 ENCOUNTER — HOSPITAL ENCOUNTER (OUTPATIENT)
Age: 63
Discharge: HOME | End: 2023-01-11
Payer: COMMERCIAL

## 2023-01-11 LAB
HCT VFR BLD CALC: 42.3 % (ref 34–48)
HEMOGLOBIN: 13.6 G/DL (ref 11.5–15.5)
MCH RBC QN AUTO: 29.7 PG (ref 26–35)
MCHC RBC AUTO-ENTMCNC: 32.2 % (ref 32–34.5)
MCV RBC AUTO: 92.4 FL (ref 80–99.9)
PDW BLD-RTO: 12.6 FL (ref 11.5–15)
PLATELET # BLD: 279 E9/L (ref 130–450)
PMV BLD AUTO: 9.6 FL (ref 7–12)
RBC # BLD: 4.58 E12/L (ref 3.5–5.5)
WBC # BLD: 6.3 E9/L (ref 4.5–11.5)

## 2023-01-11 PROCEDURE — 85027 COMPLETE CBC AUTOMATED: CPT

## 2023-01-11 PROCEDURE — 36415 COLL VENOUS BLD VENIPUNCTURE: CPT

## 2023-01-13 ENCOUNTER — OFFICE VISIT (OUTPATIENT)
Dept: HEMATOLOGY | Age: 63
End: 2023-01-13
Payer: COMMERCIAL

## 2023-01-13 VITALS
HEART RATE: 78 BPM | WEIGHT: 159 LBS | HEIGHT: 66 IN | DIASTOLIC BLOOD PRESSURE: 60 MMHG | TEMPERATURE: 97.2 F | OXYGEN SATURATION: 97 % | BODY MASS INDEX: 25.55 KG/M2 | RESPIRATION RATE: 16 BRPM | SYSTOLIC BLOOD PRESSURE: 100 MMHG

## 2023-01-13 DIAGNOSIS — K86.2 PANCREATIC CYST: Primary | ICD-10-CM

## 2023-01-13 DIAGNOSIS — D49.0 IPMN (INTRADUCTAL PAPILLARY MUCINOUS NEOPLASM): ICD-10-CM

## 2023-01-13 PROBLEM — I82.409 DVT (DEEP VENOUS THROMBOSIS) (HCC): Status: ACTIVE | Noted: 2022-12-06

## 2023-01-13 PROCEDURE — 99212 OFFICE O/P EST SF 10 MIN: CPT | Performed by: TRANSPLANT SURGERY

## 2023-01-13 PROCEDURE — 99213 OFFICE O/P EST LOW 20 MIN: CPT | Performed by: TRANSPLANT SURGERY

## 2023-01-13 NOTE — PROGRESS NOTES
Hepatobiliary and Pancreatic Surgery Progress Note    CC: Pancreatic cyst    Subjective: Patient states that she is feeling better but still has some shortness of breath. She presented to the hospital with a PE and is now on eliquis. On her CTA it showed pancreatic lesions. She had a MRI which showed pancreatic cysts. Father's side of the family has had pancreatic cancer. OBJECTIVE      Physical    /60   Pulse 78   Temp 97.2 °F (36.2 °C) (Temporal)   Resp 16   Ht 5' 6.25\" (1.683 m)   Wt 159 lb (72.1 kg)   LMP 01/01/2006   SpO2 97%   BMI 25.47 kg/m²       General appearance: appears in no acute distress  Lungs:respiratory effort normal without accessory numbers  Heart: no pedal edema  Abdomen: soft, nondistended, nontympanic, no guarding, no peritoneal signs, normoactive bowel sounds  Extremities: ROM normal    ASSESSMENT: 11mm pancreatic head BD-IPMN and 7mm BD-IPMN in the pancreatic body    PLAN:    - I reviewed their images prior to our office visit and we also reviewed them together today. - We discussed that she does not have any worrisome features which is reassuring. We discussed what worrisome features are.   - MRI in June 2023, will see her after her MRI    20 Minutes of which greater than 50% was spent counseling or coordinating her care. Thank you for the consultation and allowing me to take part in Ms. Guerrero's care.     Please send a copy of my note to Dr. Beasley Earing    Electronically signed by Parmjit Garcia MD on 1/13/2023 at 12:03 PM

## 2023-01-25 NOTE — PROGRESS NOTES
[facility]                Phone: [phone] Fax: [phone]    Physical Therapy  Outpatient Discharge Summary     Date:  2023    Patient Name:  Codi Mccormack YOB: 1960  MRN: [id number]    DIAGNOSIS:     Diagnosis Orders   1. Right shoulder pain, unspecified chronicity        2. Right knee pain, unspecified chronicity          REFERRING PHYSICIAN:  Mac Washburn MD    ATTENDANCE:  Pt has attended 8 of 8 scheduled treatments from 22 to 10/5/22. TREATMENTS RECEIVED:  stretches, ROM, strength training, education in a HEP    INITIAL STATUS:  Pain right shoulder and right knee 0-7/10  Strength: right shoulder 4/5, right hip and knee 4/5  QuickDASH: 34.1%  LEFS: 50/80    FINAL STATUS:  Pain right shoulder and right knee 3/10  Strength: right shoulder flex 4+/5, IR, ER, and abd 4-/5, right hip and knee 4/5  QuickDASH: 45.5%  LEFS: 41/80  Pt is independent with HEP    GOALS:  1 out of 5 Long Term Goals were obtained. LONG TERM GOALS NOT OBTAINED/REASON:  Goals not met due to pt's symptoms did not respond to therapeutic interventions. PATIENT GOALS:  pain relief    REASON FOR DISCHARGE:  Pt has received maximum benefit from physical therapy. PATIENT EDUCATION/INSTRUCTIONS:  Pt educated in stretching and strength training activities for HEP. RECOMMENDATIONS:  Discharge with recommendation to follow-up with physician due to continued pain. Thank you for the opportunity to work with your patient. If you have questions or comments, please feel free to contact me by phone or fax.       Electronically Signed by: Gunnar Mendez, [address] [place] [address], [id number]  2023

## 2023-02-07 ENCOUNTER — HOSPITAL ENCOUNTER (OUTPATIENT)
Age: 63
Discharge: HOME | End: 2023-02-07
Payer: COMMERCIAL

## 2023-02-07 LAB
ANION GAP SERPL CALCULATED.3IONS-SCNC: 8 MMOL/L (ref 7–16)
BUN BLDV-MCNC: 16 MG/DL (ref 6–23)
CALCIUM SERPL-MCNC: 9.1 MG/DL (ref 8.6–10.2)
CHLORIDE BLD-SCNC: 110 MMOL/L (ref 98–107)
CO2: 26 MMOL/L (ref 22–29)
CREAT SERPL-MCNC: 1 MG/DL (ref 0.5–1)
GFR SERPL CREATININE-BSD FRML MDRD: >60 ML/MIN/1.73
GLUCOSE BLD-MCNC: 90 MG/DL (ref 74–99)
POTASSIUM SERPL-SCNC: 4.6 MMOL/L (ref 3.5–5)
SODIUM BLD-SCNC: 144 MMOL/L (ref 132–146)

## 2023-02-07 PROCEDURE — 80048 BASIC METABOLIC PNL TOTAL CA: CPT

## 2023-02-07 PROCEDURE — 36415 COLL VENOUS BLD VENIPUNCTURE: CPT

## 2023-02-08 ENCOUNTER — HOSPITAL ENCOUNTER (OUTPATIENT)
Age: 63
Discharge: HOME | End: 2023-02-08
Payer: COMMERCIAL

## 2023-02-08 LAB
24HR URINE VOLUME (ML): 1800 ML
24HR URINE VOLUME (ML): 1800 ML
CREAT SERPL-MCNC: 1 MG/DL (ref 0.5–1)
CREATININE 24 HOUR URINE: 1080 MG/24H (ref 720–1510)
CREATININE 24 HOUR URINE: 1134 MG/24H (ref 720–1510)
CREATININE CLEARANCE: 75 ML/MIN (ref 60–114)
Lab: 24 HOURS
Lab: 24 HOURS
PROTEIN 24 HOUR URINE: 0.09 G/24HR (ref 0–0.14)

## 2023-02-08 PROCEDURE — 82575 CREATININE CLEARANCE TEST: CPT

## 2023-02-08 PROCEDURE — 84156 ASSAY OF PROTEIN URINE: CPT

## 2023-02-08 PROCEDURE — 36415 COLL VENOUS BLD VENIPUNCTURE: CPT

## 2023-02-21 ENCOUNTER — OFFICE VISIT (OUTPATIENT)
Dept: SURGERY | Age: 63
End: 2023-02-21
Payer: COMMERCIAL

## 2023-02-21 VITALS
HEIGHT: 66 IN | SYSTOLIC BLOOD PRESSURE: 108 MMHG | DIASTOLIC BLOOD PRESSURE: 51 MMHG | HEART RATE: 76 BPM | BODY MASS INDEX: 26.52 KG/M2 | OXYGEN SATURATION: 96 % | WEIGHT: 165 LBS

## 2023-02-21 DIAGNOSIS — R10.13 EPIGASTRIC PAIN: Primary | ICD-10-CM

## 2023-02-21 DIAGNOSIS — K62.5 RECTAL BLEEDING: ICD-10-CM

## 2023-02-21 DIAGNOSIS — Z80.0 FAMILY HISTORY OF COLON CANCER: ICD-10-CM

## 2023-02-21 DIAGNOSIS — K44.9 HIATAL HERNIA: Chronic | ICD-10-CM

## 2023-02-21 DIAGNOSIS — K21.9 GASTROESOPHAGEAL REFLUX DISEASE WITHOUT ESOPHAGITIS: ICD-10-CM

## 2023-02-21 PROCEDURE — 99202 OFFICE O/P NEW SF 15 MIN: CPT | Performed by: SURGERY

## 2023-02-21 PROCEDURE — 99205 OFFICE O/P NEW HI 60 MIN: CPT | Performed by: SURGERY

## 2023-02-21 NOTE — PROGRESS NOTES
Chief Complaint   Patient presents with    New Patient     Patient here today for rectal bleeding. Assessment    ICD-10-CM    1. Epigastric pain  R10.13 H. Pylori Antibody, IgG      2. Rectal bleeding  K62.5       3. Hiatal hernia  K44.9       4. Gastroesophageal reflux disease without esophagitis  K21.9       5. Family history of colon cancer  Z80.0               Plan    Check H. Pylori antibody - if + or we proceed with colonoscopy then recommend EGD as well  Recommend diagnostic colonoscopy-patient is considering and will call us if she elects to schedule  Continue protonix at current dose  Will need to hold Eliquis if scopes are scheduled  Return in about 1 month (around 3/21/2023) for review test results. History    HPI:  The patient presents today for evaluation of rectal bleeding. She developed worsening heartburn, atypical chest pain, and SOB and was admitted to the hospital for a large PE and started on Eliquis in early December 2022. Around the same period, she developed irregular bowel habits with lower abdominal pain. She was having some diarrhea. She reports she has a thrombosed hemorrhoid that may have caused the rectal bleeding but denies any anal pain. She is feeling better overall and requesting testing for H. Pylori as she would like her PCP to reduce her PPI dosage. .    Past Medical History:   Diagnosis Date    Abnormal Pap smear of cervix 2011 apx    Arthritis     cervical    Chronic back pain     Depression     GALEANA (dyspnea on exertion)     DVT (deep venous thrombosis) (HCC)     Left leg    Gastritis     GERD (gastroesophageal reflux disease)     Hiatal hernia 03/11/2015    Osteoarthritis     Palpitations     Partial tear of rotator cuff     PE (pulmonary thromboembolism) ([facility] [address] 12/03/2022    Bilateral lungs    PONV (postoperative nausea and vomiting)     Shingles 2007    Syncope 2012    TMJ (dislocation of temporomandibular joint)         Past Surgical History: Procedure Laterality Date    APPENDECTOMY  1970    CERVICAL POLYP REMOVAL      CHOLECYSTECTOMY, LAPAROSCOPIC      COLONOSCOPY  2015    Dr. Addy Irving    ENDOSCOPY, COLON, DIAGNOSTIC  , 2015 with colonoscopy dr. Brian Martinez    HYSTEROSCOPY  2016    and D&C     LAPAROSCOPY      ovarian cysts    [facility]  [address] [facility]    x 4        Social History     Socioeconomic History    Marital status:      Spouse name: Not on file    Number of children: Not on file    Years of education: Not on file    Highest education level: Not on file   Occupational History    Not on file   Tobacco Use    Smoking status: Former     Years: 1.00     Types: Cigarettes     Quit date: 1981     Years since quittin.4    Smokeless tobacco: Never    Tobacco comments:     only smoked x 1 year in college   Vaping Use    Vaping Use: Never used    Passive vaping exposure: Yes   Substance and Sexual Activity    Alcohol use: No     Alcohol/week: 0.0 standard drinks     Comment: rarely    Drug use: No    Sexual activity: Yes     Partners: Male     Birth control/protection: Post-menopausal   Other Topics Concern    Not on file   Social History Narrative    Not on file     Social Determinants of Health     Financial Resource Strain: Not on file   Food Insecurity: Not on file   Transportation Needs: Not on file   Physical Activity: Not on file   Stress: Not on file   Social Connections: Not on file   Intimate Partner Violence: Not on file   Housing Stability: Not on file        Family History   Problem Relation Age of Onset    Kidney Disease Mother     Heart Attack Mother     Arthritis Mother     Heart Disease Mother     High Blood Pressure Mother     High Cholesterol Mother     Stroke Mother     Vision Loss Father     Atrial Fibrillation Father     Arthritis Father     Heart Disease Father     Asthma Brother     Cancer Maternal Aunt         pancreatic cancer    Stroke Maternal Aunt     Arthritis Maternal Aunt     Asthma Maternal Uncle     Stroke Maternal Uncle     Arthritis Paternal Aunt     High Cholesterol Paternal Aunt     Cancer Paternal Aunt     Cancer Paternal Uncle     Clotting Disorder Maternal Grandmother         Current Outpatient Medications on File Prior to Visit   Medication Sig Dispense Refill    apixaban (ELIQUIS) 5 MG TABS tablet 1 tab twice a day 180 tablet 3    CALCIUM-MAGNESIUM-VITAMIN D PO Take by mouth daily      Alum & Mag Hydroxide-Simeth (MYLANTA PO) Take by mouth 3 times daily (before meals)      omeprazole (PRILOSEC OTC) 20 MG tablet Take 40 mg by mouth daily OTC      Levocetirizine Dihydrochloride (XYZAL PO) Take by mouth daily as needed      b complex vitamins capsule Take 1 capsule by mouth daily Last dose 9-6-16      Triamcinolone Acetonide (NASACORT ALLERGY 24HR NA) 1 Inhaler by Nasal route Indications: prn       therapeutic multivitamin-minerals (THERAGRAN-M) tablet Take 1 tablet by mouth daily Last dose 9-6-16       No current facility-administered medications on file prior to visit.         Allergies   Allergen Reactions    Other Anaphylaxis     Powder in exam gloves    Sucralfate Rash        Review of Systems  General - no chills, fever, weight gain or weight loss  Psychological - no anxiety or depression  Ophthalmic - no blurry vision or double vision  ENT - no epistaxis or sore throat  Hematological and Lymphatic - no bleeding problems or blood clots  Endocrine - no temperature intolerance or unexpected weight changes  Respiratory - no cough, shortness of breath, or wheezing  Cardiovascular - no chest pain or dyspnea on exertion  Gastrointestinal - see HPI   Genito-Urinary - no dysuria, trouble voiding, or hematuria  Musculoskeletal - no gait disturbance, joint pain or muscular weakness  Neurological - no headaches, numbness/tingling or weakness  Dermatological - no pruritus or rash    Physical Exam   BP (!) 108/51   Pulse 76   Ht 5' 6\" (1.676 m)   Wt 165 lb (74.8 kg)   LMP 01/01/2006   SpO2 96%   BMI 26.63 kg/m²    General - no acute distress, comfortable, 59 yo white woman   Head - normocephalic,atraumatic  Eyes - pupils symmetric, conjunctivae pink  Anorectal - small right posterior external hemorrhoid, no tenderness/rash/skin mass; prominent sebaceous glands  Skin - warm, dry, good turgor; no rash  Musculoskeletal - normal gait, no deformities   Psychiatric - alert and oriented x 3; normal mood, affect, judgement, thought content      Prior EGD and colonoscopy from 2015 reports were reviewed.

## 2023-03-07 ENCOUNTER — HOSPITAL ENCOUNTER (OUTPATIENT)
Age: 63
Discharge: HOME | End: 2023-03-07
Payer: COMMERCIAL

## 2023-03-07 DIAGNOSIS — R10.13 EPIGASTRIC PAIN: ICD-10-CM

## 2023-03-07 PROCEDURE — 36415 COLL VENOUS BLD VENIPUNCTURE: CPT

## 2023-03-07 PROCEDURE — 86677 HELICOBACTER PYLORI ANTIBODY: CPT

## 2023-03-08 LAB — HELICOBACTER PYLORI IGG: NORMAL

## 2023-03-13 DIAGNOSIS — I26.92 ACUTE SADDLE PULMONARY EMBOLISM, UNSPECIFIED WHETHER ACUTE COR PULMONALE PRESENT (HCC): Primary | ICD-10-CM

## 2023-03-14 ENCOUNTER — OFFICE VISIT (OUTPATIENT)
Dept: ONCOLOGY | Age: 63
End: 2023-03-14
Payer: COMMERCIAL

## 2023-03-14 ENCOUNTER — HOSPITAL ENCOUNTER (OUTPATIENT)
Dept: INFUSION THERAPY | Age: 63
Discharge: HOME | End: 2023-03-14
Payer: COMMERCIAL

## 2023-03-14 VITALS
DIASTOLIC BLOOD PRESSURE: 56 MMHG | BODY MASS INDEX: 26.2 KG/M2 | TEMPERATURE: 97.3 F | SYSTOLIC BLOOD PRESSURE: 106 MMHG | WEIGHT: 163 LBS | HEIGHT: 66 IN | HEART RATE: 71 BPM | OXYGEN SATURATION: 99 %

## 2023-03-14 DIAGNOSIS — I26.92 ACUTE SADDLE PULMONARY EMBOLISM WITHOUT ACUTE COR PULMONALE (HCC): Primary | ICD-10-CM

## 2023-03-14 DIAGNOSIS — I26.92 ACUTE SADDLE PULMONARY EMBOLISM, UNSPECIFIED WHETHER ACUTE COR PULMONALE PRESENT (HCC): ICD-10-CM

## 2023-03-14 DIAGNOSIS — I26.92 ACUTE SADDLE PULMONARY EMBOLISM WITHOUT ACUTE COR PULMONALE (HCC): ICD-10-CM

## 2023-03-14 LAB
BASOPHILS ABSOLUTE: 0.04 E9/L (ref 0–0.2)
BASOPHILS RELATIVE PERCENT: 0.6 % (ref 0–2)
EOSINOPHILS ABSOLUTE: 0.32 E9/L (ref 0.05–0.5)
EOSINOPHILS RELATIVE PERCENT: 4.6 % (ref 0–6)
HCT VFR BLD CALC: 42.7 % (ref 34–48)
HEMOGLOBIN: 13.9 G/DL (ref 11.5–15.5)
IMMATURE GRANULOCYTES #: 0.01 E9/L
IMMATURE GRANULOCYTES %: 0.1 % (ref 0–5)
LYMPHOCYTES ABSOLUTE: 2.1 E9/L (ref 1.5–4)
LYMPHOCYTES RELATIVE PERCENT: 30.4 % (ref 20–42)
MCH RBC QN AUTO: 29.8 PG (ref 26–35)
MCHC RBC AUTO-ENTMCNC: 32.6 % (ref 32–34.5)
MCV RBC AUTO: 91.6 FL (ref 80–99.9)
MONOCYTES ABSOLUTE: 0.58 E9/L (ref 0.1–0.95)
MONOCYTES RELATIVE PERCENT: 8.4 % (ref 2–12)
NEUTROPHILS ABSOLUTE: 3.86 E9/L (ref 1.8–7.3)
NEUTROPHILS RELATIVE PERCENT: 55.9 % (ref 43–80)
PDW BLD-RTO: 12.3 FL (ref 11.5–15)
PLATELET # BLD: 244 E9/L (ref 130–450)
PMV BLD AUTO: 9.6 FL (ref 7–12)
RBC # BLD: 4.66 E12/L (ref 3.5–5.5)
WBC # BLD: 6.9 E9/L (ref 4.5–11.5)

## 2023-03-14 PROCEDURE — 36415 COLL VENOUS BLD VENIPUNCTURE: CPT

## 2023-03-14 PROCEDURE — 99214 OFFICE O/P EST MOD 30 MIN: CPT | Performed by: INTERNAL MEDICINE

## 2023-03-14 NOTE — PROGRESS NOTES
[facility] [address] MED ONCOLOGY  [address] [facility] [address] [id number]  Dept: [phone]  Loc: [phone]  Attending progress note      Reason for Visit:   Bilateral PE. Referring Physician:  Candelaria Mathews DO    PCP:  Williams Vo MD    History of Present Illness:     Mrs. Daniele Vanegas is a 70-year-old lady with a past medical history significant for chronic back pain, GERD, LAURIE, depression, who had presented to the ED on 12/3/2022 with worsening shortness of breath for 2 weeks, was associated with palpitations, chest CTA had revealed bilateral pulmonary emboli, with evidence of right heart strain, groundglass opacities bilaterally, likely secondary to bilateral pulmonary emboli, cystic lesions at the level of pancreatic head and pancreatic body measuring up to 1.4 cm. A stat 2D echocardiogram was done which showed normal RV size and function. She had increased pulmonary pressures. The patient was started on heparin drip. subsequently had MRI of the abdomen with and without contrast done, revealing endings consistent with sidebranch IPMN configuration. Bilateral lower extremity venous ultrasound had revealed DVT in the left popliteal vein, the tibioperoneal trunk, which is nonocclusive, occlusive thrombosis in the left peroneal veins. COVID-19 testing was negative. Bilateral screening mammogram from 11/24/2021 was negative for malignancy. Testing for inherited thrombophilia was ordered. The patient had a 5 to 6-hour car trip to [place] on November 8, returned on November 11, 5 to 6 hours each direction no recent surgeries. She received the COVID-vaccine on November 2. She is not on hormonal therapy. Her paternal grandmother had DVTs and PEs, her paternal cousin had a provoked PE. No history of miscarriages. The patient was discharged home on Eliquis.      The patient returns to the office for a follow-up visit, tolerating the Eliquis well, she continues to have GI issues. H. pylori testing was negative, she has a follow-up scheduled with Dr. Benson Howard. No lower leg edema or shortness of breath    Review of Systems;  CONSTITUTIONAL: No fever, chills. Decreased appetite and energy level. ENMT: Eyes: No diplopia; Nose: No epistaxis. Mouth: No sore throat. RESPIRATORY: No hemoptysis, no shortness of breath, no cough. CARDIOVASCULAR: No cardiac type chest pain, palpitations. GASTROINTESTINAL: pos for nausea, no vomiting, positive for abdominal pressure, pos for diarrhea and constipation. GENITOURINARY: No dysuria, urinary frequency, hematuria. NEURO: No syncope, presyncope, headache.   Remainder:  ROS NEGATIVE    Past Medical History:      Diagnosis Date    Abnormal Pap smear of cervix 2011 apx    Arthritis     cervical    Chronic back pain     Depression     GALEANA (dyspnea on exertion)     DVT (deep venous thrombosis) (HCC)     Left leg    Gastritis     GERD (gastroesophageal reflux disease)     Hiatal hernia 03/11/2015    Osteoarthritis     Palpitations     Partial tear of rotator cuff     PE (pulmonary thromboembolism) ([address] 12/03/2022    Bilateral lungs    PONV (postoperative nausea and vomiting)     Shingles 2007    Syncope 2012    TMJ (dislocation of temporomandibular joint)      Patient Active Problem List   Diagnosis    GALEANA (dyspnea on exertion)    GERD (gastroesophageal reflux disease)    Arthritis    Syncope    Palpitations    LUQ pain    Family history of colon cancer    Gastritis    Hiatal hernia    Atypical squamous cells of undetermined significance on cytologic smear of cervix (ASC-US)    Postmenopausal bleeding    Abnormal ultrasound of endometrium    Cervical stenosis (uterine cervix)    Acute saddle pulmonary embolism (HCC)    Pancreatic cyst    Atrophic vaginitis    DVT (deep venous thrombosis) (HCC)    Epigastric pain    Rectal bleeding        Past Surgical History:      Procedure Laterality Date    APPENDECTOMY  1970    CERVICAL POLYP REMOVAL      CHOLECYSTECTOMY, LAPAROSCOPIC      COLONOSCOPY  2015    Dr. Christa Trivedi BIOPSY  2011    ENDOSCOPY, COLON, DIAGNOSTIC  , 2015 with colonoscopy dr. Bernardo Baugh    HYSTEROSCOPY  2016    and D&C     LAPAROSCOPY      ovarian cysts    [facility]  [address] [facility]    x 4       Family History:  Family History   Problem Relation Age of Onset    Kidney Disease Mother     Heart Attack Mother     Arthritis Mother     Heart Disease Mother     High Blood Pressure Mother     High Cholesterol Mother     Stroke Mother     Vision Loss Father     Atrial Fibrillation Father     Arthritis Father     Heart Disease Father     Asthma Brother     Cancer Maternal Aunt         pancreatic cancer    Stroke Maternal Aunt     Arthritis Maternal Aunt     Asthma Maternal Uncle     Stroke Maternal Uncle     Arthritis Paternal Aunt     High Cholesterol Paternal Aunt     Cancer Paternal Aunt     Cancer Paternal Uncle     Clotting Disorder Maternal Grandmother        Medications:  Reviewed and reconciled.     Social History:  Social History     Socioeconomic History    Marital status:      Spouse name: Not on file    Number of children: Not on file    Years of education: Not on file    Highest education level: Not on file   Occupational History    Not on file   Tobacco Use    Smoking status: Former     Years: 1.00     Types: Cigarettes     Quit date: 1981     Years since quittin.5    Smokeless tobacco: Never    Tobacco comments:     only smoked x 1 year in college   Vaping Use    Vaping Use: Never used    Passive vaping exposure: Yes   Substance and Sexual Activity    Alcohol use: No     Alcohol/week: 0.0 standard drinks     Comment: rarely    Drug use: No    Sexual activity: Yes     Partners: Male     Birth control/protection: Post-menopausal   Other Topics Concern    Not on file   Social History Narrative    Not on file     Social Determinants of Health     Financial Resource Strain: Not on file   Food Insecurity: Not on file   Transportation Needs: Not on file   Physical Activity: Not on file   Stress: Not on file   Social Connections: Not on file   Intimate Partner Violence: Not on file   Housing Stability: Not on file       Allergies: Allergies   Allergen Reactions    Other Anaphylaxis     Powder in exam gloves    Sucralfate Rash       Physical Exam:  BP (!) 106/56   Pulse 71   Temp 97.3 °F (36.3 °C)   Ht 5' 6\" (1.676 m)   Wt 163 lb (73.9 kg)   LMP 01/01/2006   SpO2 99%   BMI 26.31 kg/m²   GENERAL: Alert, oriented x 3, not in acute distress. HEENT: PERRLA; EOMI. Oropharynx clear. NECK: Supple. No palpable cervical or supraclavicular lymphadenopathy. LUNGS: Good air entry bilaterally. No wheezing, crackles or rhonchi. CARDIOVASCULAR: Regular rate. No murmurs, rubs or gallops. ABDOMEN: Soft. Non-tender, non-distended. Positive bowel sounds. EXTREMITIES: Without clubbing, cyanosis, or edema. NEUROLOGIC: No focal deficits. ECOG PS 0       Impression/Plan:    Mrs. Sanjuana Zazueta is a 58-year-old lady with a past medical history significant for chronic back pain, GERD, LAURIE, depression, who had presented to the ED on 12/3/2022 with worsening shortness of breath for 2 weeks, was associated with palpitations, chest CTA had revealed bilateral pulmonary emboli, with evidence of right heart strain, groundglass opacities bilaterally, likely secondary to bilateral pulmonary emboli, cystic lesions at the level of pancreatic head and pancreatic body measuring up to 1.4 cm. A stat 2D echocardiogram was done which showed normal RV size and function. She had increased pulmonary pressures. The patient was started on heparin drip. subsequently had MRI of the abdomen with and without contrast done, revealing endings consistent with sidebranch IPMN configuration.   Bilateral lower extremity venous ultrasound had revealed DVT in the left popliteal vein, the tibioperoneal trunk, which is nonocclusive, occlusive thrombosis in the left peroneal veins. COVID-19 testing was negative. Bilateral screening mammogram from 11/24/2021 was negative for malignancy. She is scheduled for a repeat mammogram, last coloscopy was in 2050, was recommended repeat coloscopy after 10 years, testing for inherited thrombophilia was ordered. The patient had a 5 to 6-hour car trip to [place] on November 8, returned on November 11, 5 to 6 hours each direction no recent surgeries. She received the COVID-vaccine on November 2. She is not on hormonal therapy. Her paternal grandmother had DVTs and PEs, her paternal cousin had a provoked PE. No history of miscarriages. The patient was diagnosed with left lower extremity DVT, and bilateral pulmonary emboli, the event was likely provoked by the long travel, and possibly the COVID 19 vaccine.   -Recommended testing for inherited thrombophilia and antiphospholipid antibody syndrome, results were reviewed with the patient, she does not have prothrombin gene or factor V Leiden gene mutations, no protein C, protein S or Antithrombin III deficiency, anticardiolipin and antibeta-2 glycoprotein antibodies, and lupus anticoagulant are negative.  -The patient is up-to-date with age-appropriate screening. -CT scans of the chest and MRI of the abdomen without evidence of malignancy.  -She has IPMN, the patient was seen by Dr. Carmen Rendon, plan for repeat MRI after 6 months.  -The patient is doing well clinically at this time, continue Eliquis, she is tolerating it well, was refilled.   Recommended anticoagulation for 1 year.  -Follow-up with Dr. Miguel Nina, the patient had a VQ scan done on 1/4/2023: very low probability for PE.  -We will plan on having a left lower extremity ultrasound done at the 1 year idalmis prior to discontinuing anticoagulation.  -Labs reviewed, hemoglobin is normal at 13.9, hematocrit 42.7, from 2/7/2023 creatinine was 1. RTC in 6 months. Thank you for allowing us to participate in the care of Mrs. Emile Dumont.   Rob Ornelas MD   HEMATOLOGY/MEDICAL [address] [place] [address] Rd MED ONCOLOGY  [address] [place] [id number]  [facility]pt: [facility]: [phone]

## 2023-03-20 ENCOUNTER — HOSPITAL ENCOUNTER (OUTPATIENT)
Dept: CARDIOLOGY | Age: 63
Discharge: HOME | End: 2023-03-20
Payer: COMMERCIAL

## 2023-03-20 DIAGNOSIS — I26.92 ACUTE SADDLE PULMONARY EMBOLISM WITHOUT ACUTE COR PULMONALE (HCC): ICD-10-CM

## 2023-03-20 LAB
LV EF: 60 %
LVEF MODALITY: NORMAL

## 2023-03-20 PROCEDURE — 93306 TTE W/DOPPLER COMPLETE: CPT

## 2023-04-25 ENCOUNTER — HOSPITAL ENCOUNTER (OUTPATIENT)
Age: 63
Discharge: HOME | End: 2023-04-25
Payer: COMMERCIAL

## 2023-04-25 ENCOUNTER — OFFICE VISIT (OUTPATIENT)
Dept: SURGERY | Age: 63
End: 2023-04-25
Payer: COMMERCIAL

## 2023-04-25 VITALS
SYSTOLIC BLOOD PRESSURE: 104 MMHG | WEIGHT: 164.3 LBS | BODY MASS INDEX: 26.41 KG/M2 | OXYGEN SATURATION: 100 % | HEIGHT: 66 IN | HEART RATE: 70 BPM | TEMPERATURE: 96.4 F | DIASTOLIC BLOOD PRESSURE: 53 MMHG

## 2023-04-25 DIAGNOSIS — K44.9 HIATAL HERNIA: Primary | Chronic | ICD-10-CM

## 2023-04-25 DIAGNOSIS — K21.9 GASTROESOPHAGEAL REFLUX DISEASE WITHOUT ESOPHAGITIS: ICD-10-CM

## 2023-04-25 DIAGNOSIS — Z80.0 FAMILY HISTORY OF COLON CANCER: ICD-10-CM

## 2023-04-25 PROBLEM — K62.5 RECTAL BLEEDING: Status: RESOLVED | Noted: 2023-02-21 | Resolved: 2023-04-25

## 2023-04-25 LAB
ALBUMIN SERPL-MCNC: 3.9 G/DL (ref 3.5–5.2)
ALP SERPL-CCNC: 79 U/L (ref 35–104)
ALT SERPL-CCNC: 24 U/L (ref 0–32)
ANION GAP SERPL CALCULATED.3IONS-SCNC: 13 MMOL/L (ref 7–16)
AST SERPL-CCNC: 22 U/L (ref 0–31)
BILIRUB SERPL-MCNC: 0.3 MG/DL (ref 0–1.2)
BUN SERPL-MCNC: 21 MG/DL (ref 6–23)
CALCIUM SERPL-MCNC: 9.2 MG/DL (ref 8.6–10.2)
CHLORIDE SERPL-SCNC: 108 MMOL/L (ref 98–107)
CO2 SERPL-SCNC: 22 MMOL/L (ref 22–29)
CREAT SERPL-MCNC: 1 MG/DL (ref 0.5–1)
GLUCOSE SERPL-MCNC: 89 MG/DL (ref 74–99)
MAGNESIUM SERPL-MCNC: 2.2 MG/DL (ref 1.6–2.6)
POTASSIUM SERPL-SCNC: 4.3 MMOL/L (ref 3.5–5)
PROT SERPL-MCNC: 6.3 G/DL (ref 6.4–8.3)
SODIUM SERPL-SCNC: 143 MMOL/L (ref 132–146)
VIT B12 SERPL-MCNC: 900 PG/ML (ref 211–946)

## 2023-04-25 PROCEDURE — 36415 COLL VENOUS BLD VENIPUNCTURE: CPT

## 2023-04-25 PROCEDURE — 83735 ASSAY OF MAGNESIUM: CPT

## 2023-04-25 PROCEDURE — 82607 VITAMIN B-12: CPT

## 2023-04-25 PROCEDURE — 80053 COMPREHEN METABOLIC PANEL: CPT

## 2023-04-25 PROCEDURE — 99213 OFFICE O/P EST LOW 20 MIN: CPT | Performed by: SURGERY

## 2023-04-25 PROCEDURE — 86592 SYPHILIS TEST NON-TREP QUAL: CPT

## 2023-04-25 PROCEDURE — 99212 OFFICE O/P EST SF 10 MIN: CPT | Performed by: SURGERY

## 2023-04-25 RX ORDER — SODIUM CHLORIDE 9 MG/ML
25 INJECTION, SOLUTION INTRAVENOUS PRN
OUTPATIENT
Start: 2023-04-25

## 2023-04-25 RX ORDER — SODIUM CHLORIDE 9 MG/ML
INJECTION, SOLUTION INTRAVENOUS CONTINUOUS
OUTPATIENT
Start: 2023-04-25

## 2023-04-25 RX ORDER — SODIUM CHLORIDE 0.9 % (FLUSH) 0.9 %
10 SYRINGE (ML) INJECTION PRN
OUTPATIENT
Start: 2023-04-25

## 2023-04-25 RX ORDER — SODIUM, POTASSIUM,MAG SULFATES 17.5-3.13G
1 SOLUTION, RECONSTITUTED, ORAL ORAL ONCE
Qty: 1 EACH | Refills: 0 | Status: SHIPPED | OUTPATIENT
Start: 2023-04-25 | End: 2023-04-25

## 2023-04-25 RX ORDER — SODIUM CHLORIDE 0.9 % (FLUSH) 0.9 %
10 SYRINGE (ML) INJECTION EVERY 12 HOURS SCHEDULED
OUTPATIENT
Start: 2023-04-25

## 2023-04-25 NOTE — H&P
Respiratory - normal effort, clear to auscultation  CV - regular rate and rhythm, strong femoral pulses, no pedal edema  GI - remote scars, non distended,  non tender, no hernia, no mass, no hepatosplenomegaly   Anorectal - deferred  Skin - warm, dry, good turgor; no rash  Musculoskeletal - normal gait, no deformities   Psychiatric - alert and oriented x 3; normal mood, affect, judgement, thought content

## 2023-04-25 NOTE — PATIENT INSTRUCTIONS
Patient Information and Instructions for Colonoscopy         Definition of Colonoscopy   A colonoscopy is the visual exam of the rectum and colon (large intestine). The exam is done with a tool called a colonoscope. The colonoscope is a flexible tube with a tiny camera on the end. This instrument allows the doctor to view the inside of your rectum and colon. Sigmoidoscopy is a shorter scope that views only the last one third of the colon. Reasons for Colonoscopy   It is used to examine, diagnose, and treat problems in your large intestine. The procedure is most often done for the following reasons: To determine the cause of abdominal pain, rectal bleeding, or a change in bowel habits   To detect and treat colon cancer or colon polyps   To obtain tissue samples for testing   To stop intestinal bleeding   Monitor response to treatment if you have inflammatory bowel disease     Possible Complications   Complications are rare, but no procedure is completely free of risk. If you are planning to have a colonoscopy, your doctor will review a list of possible complications, which may include:   Bleeding   Reaction to the sedation causing drop in your blood pressure or problems breathing  Perforation or puncture of the bowel     Factors that may increase the risk of complications include:   Pre-existing heart or kidney condition   Treatment with certain medicines, including aspirin and other drugs with anticoagulant or blood-thinning properties   Prior abdominal surgery or radiation treatments   Active colitis , diverticulitis , or other acute bowel disease   Previous treatment with radiation therapy     Be sure to discuss these risks with your doctor before the procedure.      What to Expect   Prior to Procedure   Your doctor will likely do the following:   Physical exam   Health history   Review of medicines   Test your stool for hidden blood (called \"occult blood\")     Your colon must be completely clean before

## 2023-04-26 LAB — RPR SER QL: NORMAL

## 2023-04-28 ENCOUNTER — PREP FOR PROCEDURE (OUTPATIENT)
Dept: SURGERY | Age: 63
End: 2023-04-28

## 2023-04-28 ENCOUNTER — TELEPHONE (OUTPATIENT)
Dept: SURGERY | Age: 63
End: 2023-04-28

## 2023-04-28 NOTE — TELEPHONE ENCOUNTER
Patient is scheduled for EGD/colonoscopy with  [facility] on 23 at 1:00 with an arrival time of 12:00. Pt accepted date and time and verbalized understanding. Procedure letter mailed to patient as well. Prior Authorization Form:      DEMOGRAPHICS:                     Patient Name:  Tonia Estrada  Patient :  1960            Insurance:  Payor: Shalini Mcknight / Plan: [employer] [id number] / Product Type: *No Product type* /   Insurance ID Number:    Payer/Plan Subscr  Sex Relation Sub. Ins. ID Effective Group Num   1.  [address] [facility] 1960 Female Self [id number] 1/1/15 [id number]                                    [id number]         DIAGNOSIS & PROCEDURE:                       Procedure/Operation: EGD/colonoscopy           CPT Code: 36417/46188    Diagnosis:  GERD/family history of colon cancer    ICD10 Code: K21.9/Z80.0    Location:  [address] [place] [address]    Surgeon:  Dr. Sanots Dockery INFORMATION:                          Date: 23    Time: 1:00              Anesthesia:  MAC/TIVA                                                       Status:  Outpatient        Special Comments:  N/A       Electronically signed by Dennis Car MA on 2023 at 2:21 PM

## 2023-05-24 RX ORDER — OMEPRAZOLE 40 MG/1
40 CAPSULE, DELAYED RELEASE ORAL DAILY
COMMUNITY
End: 2023-05-24

## 2023-05-24 NOTE — PROGRESS NOTES
[address] [place] [address] PRE-ADMISSION TESTING   ENDOSCOPY/ COLONSCOPY INSTRUCTIONS  PAT- Phone Number: [phone]    ENDOSCOPY/ COLONSCOPY INSTRUCTIONS:     [x] Bowel Prep instructions reviewed. Colonoscopy- The day prior: No solid foods. Clear liquids only. [x] Nothing by mouth after midnight. Including no gum, candy, mints, or water. [x] You may brush your teeth, gargle, but do NOT swallow water. [x] Do not wear makeup, lotions, powders, deodorant. [] Urine Pregnancy test will be preformed the day of surgery. A specimen sample may be brought from home. [x] Arrange transportation with a responsible adult  to and from the hospital. If you do not have a responsible adult  to transport you, you will need to make arrangements with a medical transportation company. Arrange for someone to be with you for the remainder of the day and for 24 hours after your procedure due to having had anesthesia. -Who will be your  for transportation? _spouse_________________   -Who will be staying with you for 24 hrs after your procedure?___spouse_______________    PARKING INSTRUCTIONS:     [x] ARRIVAL DATE & TIME: 6/2 @ 1145  [x] Times are subject to change. We will contact you the business day before surgery if that were to occur. [x] Enter into the [employer]. Two people may accompany you. Masks are not required. [x] Parking Lot \"I\" is where you will park. It is located on the corner of [address] [place] [address] and [address] [facility]. The entrance is on [address] [facility]. Only one vehicle - per patient, is permitted in parking lot. Upon entering the parking lot, a voucher ticket will print. MEDICATION INSTRUCTIONS:    [x] Bring a complete list of your medications, please write the last time you took the medicine, give this list to the nurse in Pre-Op.   [x] Take only the following medications the morning of surgery with 1-2 ounces of water:   [x] Stop all herbal supplements and

## 2023-06-22 ENCOUNTER — OFFICE VISIT (OUTPATIENT)
Dept: ORTHOPEDIC SURGERY | Age: 63
End: 2023-06-22

## 2023-06-22 ENCOUNTER — TELEPHONE (OUTPATIENT)
Dept: HEMATOLOGY | Age: 63
End: 2023-06-22

## 2023-06-22 DIAGNOSIS — M75.111 NONTRAUMATIC INCOMPLETE TEAR OF RIGHT ROTATOR CUFF: Primary | ICD-10-CM

## 2023-06-22 RX ORDER — TRIAMCINOLONE ACETONIDE 40 MG/ML
40 INJECTION, SUSPENSION INTRA-ARTICULAR; INTRAMUSCULAR ONCE
Status: COMPLETED | OUTPATIENT
Start: 2023-06-22 | End: 2023-06-22

## 2023-06-22 RX ORDER — BUPIVACAINE HYDROCHLORIDE 2.5 MG/ML
2 INJECTION, SOLUTION INFILTRATION; PERINEURAL ONCE
Status: COMPLETED | OUTPATIENT
Start: 2023-06-22 | End: 2023-06-22

## 2023-06-22 RX ADMIN — TRIAMCINOLONE ACETONIDE 40 MG: 40 INJECTION, SUSPENSION INTRA-ARTICULAR; INTRAMUSCULAR at 13:59

## 2023-06-22 RX ADMIN — BUPIVACAINE HYDROCHLORIDE 5 MG: 2.5 INJECTION, SOLUTION INFILTRATION; PERINEURAL at 13:58

## 2023-06-22 NOTE — PROGRESS NOTES
Follow Up Visit     PI update 6/22/2023: Edilia Son is here today for right shoulder pain. She been doing physical therapy and doing well with her injections but it has since worn off. She stopped her Eliquis 3 days ago for injection. She still has activity related shoulder pain worse with overhead issues. Overall no new complaints. No new injuries. Denies fever and chills. Denies numbness tingling. Kenji Rojas returns today for follow up visit regarding her right shoulder and right knee. Her last visit we injected her shoulder and started her in physical therapy. Since that time she was admitted to the hospital with bilateral pulmonary embolus. She is now taking Eliquis for treatment. Her shoulder pain improved with the injection. She really has not started doing her exercises due to her medical issues. Her knee pain is greatly improved. Physical Exam:     No data recorded    General: Alert and oriented x3, no acute distress  Cardiovascular/pulmonary: No labored breathing, peripheral perfusion intact  Musculoskeletal:    Right shoulder: Atraumatic. She has near full range of motion in forward flexion abduction and external rotation. She has limited internal rotation to L4. She has some weakness with resisted external and internal rotation. She has 4-5 strength with resisted shoulder abduction. She has positive impingement sign. She is tender palpation over AC joint. Her elbow range of motion is full and unrestricted without any pain         Controlled Substances Monitoring:      Imaging:  No new imaging today      Assessment:   Right shoulder partial-thickness rotator cuff tearing with subacromial bursitis and AC joint arthritis        Plan:   -She is still being treated for pulmonary embolus and DVT. For this reason we are going to continue to put it off arthroscopic surgery. We will continue conservative treatment in the form of injections and therapy.   She can follow-up on an

## 2023-06-30 ENCOUNTER — HOSPITAL ENCOUNTER (OUTPATIENT)
Age: 63
Discharge: HOME | End: 2023-06-30
Payer: COMMERCIAL

## 2023-06-30 ENCOUNTER — HOSPITAL ENCOUNTER (OUTPATIENT)
Dept: GENERAL RADIOLOGY | Age: 63
End: 2023-06-30
Payer: COMMERCIAL

## 2023-06-30 DIAGNOSIS — R52 PAIN: ICD-10-CM

## 2023-06-30 PROCEDURE — 73610 X-RAY EXAM OF ANKLE: CPT

## 2023-06-30 PROCEDURE — 73630 X-RAY EXAM OF FOOT: CPT

## 2023-07-10 ENCOUNTER — TELEPHONE (OUTPATIENT)
Dept: ONCOLOGY | Age: 63
End: 2023-07-10

## 2023-07-11 ENCOUNTER — TELEPHONE (OUTPATIENT)
Dept: HEMATOLOGY | Age: 63
End: 2023-07-11

## 2023-07-11 DIAGNOSIS — K86.2 PANCREATIC CYST: Primary | ICD-10-CM

## 2023-07-11 DIAGNOSIS — D49.0 IPMN (INTRADUCTAL PAPILLARY MUCINOUS NEOPLASM): ICD-10-CM

## 2023-07-11 NOTE — TELEPHONE ENCOUNTER
I called and spoke with the patient to let her know that prior to her MRI on Friday a set of labs need to be drawn.  She is aware she can go to any [facility] prior and have these done  Electronically signed by Darden Sicard, MA on 7/11/2023 at 1:48 PM

## 2023-07-13 ENCOUNTER — HOSPITAL ENCOUNTER (OUTPATIENT)
Age: 63
Discharge: HOME | End: 2023-07-13
Payer: COMMERCIAL

## 2023-07-13 DIAGNOSIS — K86.2 PANCREATIC CYST: ICD-10-CM

## 2023-07-13 DIAGNOSIS — D49.0 IPMN (INTRADUCTAL PAPILLARY MUCINOUS NEOPLASM): ICD-10-CM

## 2023-07-13 LAB
ALBUMIN SERPL-MCNC: 3.9 G/DL (ref 3.5–5.2)
ALP SERPL-CCNC: 85 U/L (ref 35–104)
ALT SERPL-CCNC: 21 U/L (ref 0–32)
ANION GAP SERPL CALCULATED.3IONS-SCNC: 9 MMOL/L (ref 7–16)
AST SERPL-CCNC: 19 U/L (ref 0–31)
BILIRUB SERPL-MCNC: 0.5 MG/DL (ref 0–1.2)
BUN SERPL-MCNC: 18 MG/DL (ref 6–23)
CALCIUM SERPL-MCNC: 9.2 MG/DL (ref 8.6–10.2)
CHLORIDE SERPL-SCNC: 105 MMOL/L (ref 98–107)
CO2 SERPL-SCNC: 26 MMOL/L (ref 22–29)
CREAT SERPL-MCNC: 1.1 MG/DL (ref 0.5–1)
GLUCOSE SERPL-MCNC: 92 MG/DL (ref 74–99)
POTASSIUM SERPL-SCNC: 4.5 MMOL/L (ref 3.5–5)
PROT SERPL-MCNC: 6.6 G/DL (ref 6.4–8.3)
SODIUM SERPL-SCNC: 140 MMOL/L (ref 132–146)

## 2023-07-13 PROCEDURE — 80053 COMPREHEN METABOLIC PANEL: CPT

## 2023-07-13 PROCEDURE — 36415 COLL VENOUS BLD VENIPUNCTURE: CPT

## 2023-07-14 ENCOUNTER — HOSPITAL ENCOUNTER (OUTPATIENT)
Dept: MRI IMAGING | Age: 63
Discharge: HOME | End: 2023-07-14
Attending: TRANSPLANT SURGERY
Payer: COMMERCIAL

## 2023-07-14 DIAGNOSIS — D49.0 IPMN (INTRADUCTAL PAPILLARY MUCINOUS NEOPLASM): ICD-10-CM

## 2023-07-14 PROCEDURE — A9579 GAD-BASE MR CONTRAST NOS,1ML: HCPCS | Performed by: RADIOLOGY

## 2023-07-14 PROCEDURE — 6360000004 HC RX CONTRAST MEDICATION: Performed by: RADIOLOGY

## 2023-07-14 PROCEDURE — 74183 MRI ABD W/O CNTR FLWD CNTR: CPT

## 2023-07-14 RX ADMIN — GADOTERIDOL 15 ML: 279.3 INJECTION, SOLUTION INTRAVENOUS at 09:13

## 2023-07-21 NOTE — PROGRESS NOTES
[address] [place] [address] PRE-ADMISSION TESTING   ENDOSCOPY/ COLONSCOPY INSTRUCTIONS  PAT- Phone Number: [phone]    ENDOSCOPY/ COLONSCOPY INSTRUCTIONS:     [] Bowel Prep instructions reviewed. [] Colonoscopy- The day prior: No solid foods. Clear liquids only. [] Nothing by mouth after midnight. Including no gum, candy, mints, or water.   [] You may brush your teeth, gargle, but do NOT swallow water. [] Do not wear makeup, lotions, powders, deodorant. [] Urine Pregnancy test will be preformed the day of surgery. A specimen sample may be brought from home. [] Arrange transportation with a responsible adult  to and from the hospital. If you do not have a responsible adult  to transport you, you will need to make arrangements with a medical transportation company. Arrange for someone to be with you for the remainder of the day and for 24 hours after your procedure due to having had anesthesia. -Who will be your  for transportation?__________________   -Who will be staying with you for 24 hrs after your procedure?__________________    PARKING INSTRUCTIONS:     [] ARRIVAL DATE & TIME:   [] Times are subject to change. We will contact you the business day before surgery if that were to occur.  [] Enter into the [facility]. Two people may accompany you. Masks are not required. [] Parking Lot \"I\" is where you will park. It is located on the corner of [address] [place] [address] and [address] [facility]. The entrance is on [address] [facility]. Only one vehicle - per patient, is permitted in parking lot. Upon entering the parking lot, a voucher ticket will print. MEDICATION INSTRUCTIONS:    [] Bring a complete list of your medications, please write the last time you took the medicine, give this list to the nurse in Pre-Op.   [] Take only the following medications the morning of surgery with 1-2 ounces of water:   [] Stop all herbal supplements and vitamins 5 days before surgery. [] Stop all NSAIDS 7 days before surgery. [] DO NOT take any diabetic medicine the morning of surgery. Follow instructions for insulin the day before surgery. [] If you are diabetic and your blood sugar is low or you feel symptomatic, you may drink 1-2 ounces of apple juice or take a glucose tablet.            -The morning of your procedure, you may call the pre-op area if you have concerns about your blood sugar [phone]. [] Use your inhalers the morning of surgery. Bring your emergency inhaler with you day of surgery. [] Follow physician instructions regarding any blood thinners you may be taking. WHAT TO EXPECT:    [] The day of your procedure you will be greeted and checked in by the [employer].  In addition, you will be registered in the [facility] by a Patient Access Representative. Please bring your photo ID and insurance card. A nurse will greet you in accordance to the time you are needed in the pre-op area to prepare you for surgery. Please do not be discouraged if you are not greeted in the order you arrive as there are many variables that are involved in patient preparation. Your patience is greatly appreciated as you wait for your nurse. Please bring in items such as: books, magazines, newspapers, electronics, or any other items  to occupy your time in the waiting area. []  Delays may occur. Staff will make a sincere effort to keep you informed of delays. If any delays occur with your procedure, we apologize ahead of time for your inconvenience as we recognize the value of your time.

## 2023-07-21 NOTE — PROGRESS NOTES
[address] [place] [address] PRE-ADMISSION TESTING   ENDOSCOPY/ COLONSCOPY INSTRUCTIONS  PAT- Phone Number: [phone]    ENDOSCOPY/ COLONSCOPY INSTRUCTIONS:     [x] Bowel Prep instructions reviewed. [x] Colonoscopy- The day prior: No solid foods. Clear liquids only. [x] Nothing by mouth after midnight. Including no gum, candy, mints, or water. [x] You may brush your teeth, gargle, but do NOT swallow water. [x] Do not wear makeup, lotions, powders, deodorant. [] Urine Pregnancy test will be preformed the day of surgery. A specimen sample may be brought from home. [x] Arrange transportation with a responsible adult  to and from the hospital. If you do not have a responsible adult  to transport you, you will need to make arrangements with a medical transportation company. Arrange for someone to be with you for the remainder of the day and for 24 hours after your procedure due to having had anesthesia. -Who will be your  for transportation?_____mike_____________   -Who will be staying with you for 24 hrs after your procedure?___mike_______________    PARKING INSTRUCTIONS:     [x] ARRIVAL DATE & TIME: 7/26 1045  [] Times are subject to change. We will contact you the business day before surgery if that were to occur. [x] Enter into the [facility] [employer] [facility] [place]. Two people may accompany you. Masks are not required. [x] Parking Lot \"I\" is where you will park. It is located on the corner of [address] [place] [address] and [address] [facility]. The entrance is on [address] [facility]. Only one vehicle - per patient, is permitted in parking lot. Upon entering the parking lot, a voucher ticket will print. MEDICATION INSTRUCTIONS:    [x] Bring a complete list of your medications, please write the last time you took the medicine, give this list to the nurse in Pre-Op.   [x] Take only the following medications the morning of surgery with 1-2 ounces of water: See med sheet  [x] Stop all herbal supplements and vitamins 5 days before surgery. [] Stop all NSAIDS 7 days before surgery. [] DO NOT take any diabetic medicine the morning of surgery. Follow instructions for insulin the day before surgery. [] If you are diabetic and your blood sugar is low or you feel symptomatic, you may drink 1-2 ounces of apple juice or take a glucose tablet.            -The morning of your procedure, you may call the pre-op area if you have concerns about your blood sugar [phone]. [] Use your inhalers the morning of surgery. Bring your emergency inhaler with you day of surgery. [x] Follow physician instructions regarding any blood thinners you may be taking. WHAT TO EXPECT:    [x] The day of your procedure you will be greeted and checked in by the [employer].  In addition, you will be registered in the [facility] by a Patient Access Representative. Please bring your photo ID and insurance card. A nurse will greet you in accordance to the time you are needed in the pre-op area to prepare you for surgery. Please do not be discouraged if you are not greeted in the order you arrive as there are many variables that are involved in patient preparation. Your patience is greatly appreciated as you wait for your nurse. Please bring in items such as: books, magazines, newspapers, electronics, or any other items  to occupy your time in the waiting area. []  Delays may occur. Staff will make a sincere effort to keep you informed of delays. If any delays occur with your procedure, we apologize ahead of time for your inconvenience as we recognize the value of your time.

## 2023-07-25 ENCOUNTER — ANESTHESIA EVENT (OUTPATIENT)
Dept: ENDOSCOPY | Age: 63
End: 2023-07-25
Payer: COMMERCIAL

## 2023-07-26 ENCOUNTER — HOSPITAL ENCOUNTER (OUTPATIENT)
Age: 63
Setting detail: OUTPATIENT SURGERY
Discharge: HOME | End: 2023-07-26
Attending: SURGERY | Admitting: SURGERY
Payer: COMMERCIAL

## 2023-07-26 ENCOUNTER — ANESTHESIA (OUTPATIENT)
Dept: ENDOSCOPY | Age: 63
End: 2023-07-26
Payer: COMMERCIAL

## 2023-07-26 VITALS
RESPIRATION RATE: 16 BRPM | BODY MASS INDEX: 25.71 KG/M2 | HEIGHT: 66 IN | HEART RATE: 64 BPM | DIASTOLIC BLOOD PRESSURE: 67 MMHG | SYSTOLIC BLOOD PRESSURE: 105 MMHG | WEIGHT: 160 LBS | OXYGEN SATURATION: 99 % | TEMPERATURE: 97 F

## 2023-07-26 DIAGNOSIS — K21.9 GASTROESOPHAGEAL REFLUX DISEASE: ICD-10-CM

## 2023-07-26 DIAGNOSIS — Z80.0 FAMILY HISTORY OF COLON CANCER: ICD-10-CM

## 2023-07-26 PROCEDURE — 88305 TISSUE EXAM BY PATHOLOGIST: CPT

## 2023-07-26 PROCEDURE — 3609027000 HC COLONOSCOPY: Performed by: SURGERY

## 2023-07-26 PROCEDURE — 7100000011 HC PHASE II RECOVERY - ADDTL 15 MIN: Performed by: SURGERY

## 2023-07-26 PROCEDURE — 6360000002 HC RX W HCPCS: Performed by: NURSE ANESTHETIST, CERTIFIED REGISTERED

## 2023-07-26 PROCEDURE — 2580000003 HC RX 258: Performed by: SURGERY

## 2023-07-26 PROCEDURE — 43239 EGD BIOPSY SINGLE/MULTIPLE: CPT | Performed by: SURGERY

## 2023-07-26 PROCEDURE — 88342 IMHCHEM/IMCYTCHM 1ST ANTB: CPT

## 2023-07-26 PROCEDURE — 7100000010 HC PHASE II RECOVERY - FIRST 15 MIN: Performed by: SURGERY

## 2023-07-26 PROCEDURE — 3700000000 HC ANESTHESIA ATTENDED CARE: Performed by: SURGERY

## 2023-07-26 PROCEDURE — 3609012400 HC EGD TRANSORAL BIOPSY SINGLE/MULTIPLE: Performed by: SURGERY

## 2023-07-26 PROCEDURE — 2709999900 HC NON-CHARGEABLE SUPPLY: Performed by: SURGERY

## 2023-07-26 PROCEDURE — 3700000001 HC ADD 15 MINUTES (ANESTHESIA): Performed by: SURGERY

## 2023-07-26 PROCEDURE — 45378 DIAGNOSTIC COLONOSCOPY: CPT | Performed by: SURGERY

## 2023-07-26 RX ORDER — SODIUM CHLORIDE 9 MG/ML
INJECTION, SOLUTION INTRAVENOUS CONTINUOUS
Status: DISCONTINUED | OUTPATIENT
Start: 2023-07-26 | End: 2023-07-26 | Stop reason: HOSPADM

## 2023-07-26 RX ORDER — ONDANSETRON 2 MG/ML
INJECTION INTRAMUSCULAR; INTRAVENOUS PRN
Status: DISCONTINUED | OUTPATIENT
Start: 2023-07-26 | End: 2023-07-26 | Stop reason: SDUPTHER

## 2023-07-26 RX ORDER — LIDOCAINE HYDROCHLORIDE 20 MG/ML
INJECTION, SOLUTION INTRAVENOUS PRN
Status: DISCONTINUED | OUTPATIENT
Start: 2023-07-26 | End: 2023-07-26 | Stop reason: SDUPTHER

## 2023-07-26 RX ORDER — SODIUM CHLORIDE 0.9 % (FLUSH) 0.9 %
10 SYRINGE (ML) INJECTION EVERY 12 HOURS SCHEDULED
Status: DISCONTINUED | OUTPATIENT
Start: 2023-07-26 | End: 2023-07-26 | Stop reason: HOSPADM

## 2023-07-26 RX ORDER — SODIUM CHLORIDE 0.9 % (FLUSH) 0.9 %
10 SYRINGE (ML) INJECTION PRN
Status: DISCONTINUED | OUTPATIENT
Start: 2023-07-26 | End: 2023-07-26 | Stop reason: HOSPADM

## 2023-07-26 RX ORDER — SODIUM CHLORIDE 9 MG/ML
25 INJECTION, SOLUTION INTRAVENOUS PRN
Status: DISCONTINUED | OUTPATIENT
Start: 2023-07-26 | End: 2023-07-26 | Stop reason: HOSPADM

## 2023-07-26 RX ORDER — PROPOFOL 10 MG/ML
INJECTION, EMULSION INTRAVENOUS PRN
Status: DISCONTINUED | OUTPATIENT
Start: 2023-07-26 | End: 2023-07-26 | Stop reason: SDUPTHER

## 2023-07-26 RX ADMIN — ONDANSETRON 4 MG: 2 INJECTION INTRAMUSCULAR; INTRAVENOUS at 11:47

## 2023-07-26 RX ADMIN — SODIUM CHLORIDE: 9 INJECTION, SOLUTION INTRAVENOUS at 11:26

## 2023-07-26 RX ADMIN — PROPOFOL 930 MG: 10 INJECTION, EMULSION INTRAVENOUS at 11:54

## 2023-07-26 RX ADMIN — LIDOCAINE HYDROCHLORIDE 20 MG: 20 INJECTION, SOLUTION INTRAVENOUS at 11:54

## 2023-07-26 ASSESSMENT — PAIN SCALES - GENERAL: PAINLEVEL_OUTOF10: 0

## 2023-07-26 ASSESSMENT — PAIN - FUNCTIONAL ASSESSMENT: PAIN_FUNCTIONAL_ASSESSMENT: 0-10

## 2023-07-26 NOTE — H&P
No chief complaint on file. Assessment    ICD-10-CM    1. Hiatal hernia  K44.9       2. Gastroesophageal reflux disease without esophagitis  K21.9       3. Family history of colon cancer  Z80.0               Plan    Hold Eliquis 3 days prior to procedures  Suprep bowel prep  No follow-ups on file. History    HPI:  The patient presents today for follow up of GERD symptoms and rectal bleeding. She continues on omeprazole 40 mg daily. Symptoms are exacerbated by diet changes. Rectal bleeding has stopped. She reports some mild, constant, aching LLQ pain.   She follows with [facility] for pancreatic cyst.    EGD/colonoscopy 3/11/15 showed non-erosive GERD, hiatal hernia, diffuse gastritis, normal colon    Past Medical History:   Diagnosis Date    Abnormal Pap smear of cervix 2011 apx    Arthritis     cervical    Chronic back pain     Depression 1997    resolved    GALEANA (dyspnea on exertion)     DVT (deep venous thrombosis) ([address]) 12/2022    Left leg    Gastritis     GERD (gastroesophageal reflux disease)     Hiatal hernia 03/11/2015    Osteoarthritis     Palpitations     Partial tear of rotator cuff     PE (pulmonary thromboembolism) ([address]) 12/03/2022    Bilateral lungs    PONV (postoperative nausea and vomiting)     Shingles 2007    Syncope 2012    TMJ (dislocation of temporomandibular joint)         Past Surgical History:   Procedure Laterality Date    [address], LAPAROSCOPIC  2003    COLONOSCOPY  03/11/2015    Dr. Mindy Fang BIOPSY  2011    ENDOSCOPY, COLON, DIAGNOSTIC  2003, 2015 2015 with colonoscopy dr. Lacy Morris    HYSTEROSCOPY  09/08/2016    and D&C     LAPAROSCOPY  1989    ovarian cysts    LASIK  1998    TONSILLECTOMY AND ADENOIDECTOMY  1985    WISDOM TOOTH EXTRACTION  1983    x 4        Social History     Socioeconomic History    Marital status:      Spouse name: Not on file

## 2023-07-26 NOTE — OP NOTE
DATE OF PROCEDURE:  7/26/2023    PROCEDURE:    Esophagogastroduodenoscopy biopsy   Colonoscopy     INDICATION:    GERD      Hiatal hernia      FH colon cancer      SURGEON:    Joshua Cristobal MD    ASSISTANT:    None    ANESTHESIA:   MAC     EBL:     None    COMPLICATIONS:   None    SPECIMENS:    Gastric antrum     PROCEDURE: After identification of patient and confirmation of the procedure, the patient was brought to the endoscopy suite and placed in the left lateral decubitus position. The head of the bed was then slightly elevated. A bite block was inserted between the incisors. Deep conscious sedation was administered by the Anesthesia staff. A lubricated adult gastroscope was then passed through the oral cavity into the oropharynx. The esophagus was then visualized and gently intubated. The scope was then advanced down the esophagus into the stomach. The pylorus and second portion of the duodenum were intubated. The scope was withdrawn into the stomach and retroflexed. Multiple cold forcep biopsies were obtained. The scope was then straightened and retrieved. A digital rectal exam was performed, which was unremarkable. A lubricated adult colonoscope was inserted through the anus into the rectum and advanced into the transverse colon but could not be advanced further due to loops. Therefore, the adult scope was removed, and a pediatric colonoscope was inserted and advanced to the cecum. The bowel prep was excellent. The terminal ileum was  intubated. The scope was then slowly withdrawn all the way back into the rectum and retroflexed. No biopsies were obtained. The scope was straightened. The colon was decompressed. The scope was removed.       FINDINGS:  Moderate antral gastritis    ASSESSMENT:  Gastritis r/o Helicobacter Pylori     RECOMMENDATIONS:   Antireflux diet  Continue PPI  Biopsies pending  Repeat colonoscopy in 10 years, (earlier if alarm symptoms such as pain, bleeding, weight loss, diarrhea, or sudden onset constipation occur)

## 2023-07-26 NOTE — ANESTHESIA POSTPROCEDURE EVALUATION
Department of Anesthesiology  Postprocedure Note    Patient: Ronnie Vanegas  MRN: [id number]  YOB: 1960  Date of evaluation: 7/26/2023      Procedure Summary     Date: 07/26/23 Room / Location: [facility] 01 / [facility]    Anesthesia Start: 1147 Anesthesia Stop: 1255    Procedures:       EGD BIOPSY      COLONOSCOPY WITH PEDIATRIC SCOPE Diagnosis:       Gastroesophageal reflux disease      Family history of colon cancer      (Gastroesophageal reflux disease [K21.9])      (Family history of colon cancer [Z80.0])    Surgeons: Dacia Angulo MD Responsible Provider: Estefany Neumann DO    Anesthesia Type: MAC ASA Status: 2          Anesthesia Type: No value filed. Yelitza Phase I: Yelitza Score: 10    Yelitza Phase II: Yelitza Score: 10      Anesthesia Post Evaluation    Patient location during evaluation: bedside  Patient participation: complete - patient cannot participate  Level of consciousness: awake and alert  Airway patency: patent  Nausea & Vomiting: no nausea and no vomiting  Complications: no  Cardiovascular status: blood pressure returned to baseline  Respiratory status: acceptable  Hydration status: euvolemic  There was medical reason for not using a multimodal analgesia pain management approach.

## 2023-07-26 NOTE — PROGRESS NOTES
Discharge instructions given with  at the bedside. Pt and  verbalized understanding of discharge instructions.  Pt  signed discharge instructions for patient

## 2023-07-28 ENCOUNTER — OFFICE VISIT (OUTPATIENT)
Dept: HEMATOLOGY | Age: 63
End: 2023-07-28
Payer: COMMERCIAL

## 2023-07-28 VITALS
DIASTOLIC BLOOD PRESSURE: 46 MMHG | BODY MASS INDEX: 25.23 KG/M2 | WEIGHT: 157 LBS | SYSTOLIC BLOOD PRESSURE: 99 MMHG | HEIGHT: 66 IN | RESPIRATION RATE: 16 BRPM | HEART RATE: 74 BPM | OXYGEN SATURATION: 98 % | TEMPERATURE: 98 F

## 2023-07-28 DIAGNOSIS — K86.2 PANCREATIC CYST: Primary | ICD-10-CM

## 2023-07-28 PROCEDURE — 99212 OFFICE O/P EST SF 10 MIN: CPT | Performed by: CLINICAL NURSE SPECIALIST

## 2023-07-28 PROCEDURE — 99213 OFFICE O/P EST LOW 20 MIN: CPT | Performed by: CLINICAL NURSE SPECIALIST

## 2023-07-28 RX ORDER — DIAZEPAM 5 MG/1
10 TABLET ORAL ONCE
Qty: 2 TABLET | Refills: 0 | Status: SHIPPED | OUTPATIENT
Start: 2023-07-28 | End: 2023-07-28

## 2023-08-01 LAB — SURGICAL PATHOLOGY REPORT: NORMAL

## 2023-08-04 ENCOUNTER — HOSPITAL ENCOUNTER (OUTPATIENT)
Age: 63
Discharge: HOME | End: 2023-08-04
Payer: COMMERCIAL

## 2023-08-04 ENCOUNTER — HOSPITAL ENCOUNTER (OUTPATIENT)
Dept: GENERAL RADIOLOGY | Age: 63
End: 2023-08-04
Payer: COMMERCIAL

## 2023-08-04 DIAGNOSIS — J90 PLEURAL EFFUSION: ICD-10-CM

## 2023-08-04 PROCEDURE — 71046 X-RAY EXAM CHEST 2 VIEWS: CPT

## 2023-08-25 ENCOUNTER — TELEPHONE (OUTPATIENT)
Dept: INFUSION THERAPY | Age: 63
End: 2023-08-25

## 2023-08-25 NOTE — TELEPHONE ENCOUNTER
Spoke with patient regarding her MyChart message to Dr. Sushil Baer pertaining to patients labwork. Per Dr. Sushil Baer, the labs that Dr. Hima Santos ordered included all the labs that Dr. Sushil Baer  would like drawn at this time. Patient states understanding of these instructions. Questions answered and emotional support offered.

## 2023-08-29 ENCOUNTER — HOSPITAL ENCOUNTER (OUTPATIENT)
Age: 63
Discharge: HOME | End: 2023-08-29
Payer: COMMERCIAL

## 2023-08-29 LAB
BILIRUB UR QL STRIP: NEGATIVE
BUN SERPL-MCNC: 15 MG/DL (ref 6–23)
CHOLEST SERPL-MCNC: 179 MG/DL
CLARITY UR: CLEAR
COLOR UR: YELLOW
CREAT SERPL-MCNC: 1.1 MG/DL (ref 0.5–1)
ERYTHROCYTE [DISTWIDTH] IN BLOOD BY AUTOMATED COUNT: 13 % (ref 11.5–15)
GFR SERPL CREATININE-BSD FRML MDRD: 60 ML/MIN/1.73M2
GLUCOSE UR STRIP-MCNC: NEGATIVE MG/DL
HCT VFR BLD AUTO: 43.1 % (ref 34–48)
HDLC SERPL-MCNC: 57 MG/DL
HGB BLD-MCNC: 14.1 G/DL (ref 11.5–15.5)
HGB UR QL STRIP.AUTO: NEGATIVE
KETONES UR STRIP-MCNC: NEGATIVE MG/DL
LDLC SERPL CALC-MCNC: 101 MG/DL
LEUKOCYTE ESTERASE UR QL STRIP: ABNORMAL
MAGNESIUM SERPL-MCNC: 2.2 MG/DL (ref 1.6–2.6)
MCH RBC QN AUTO: 30.2 PG (ref 26–35)
MCHC RBC AUTO-ENTMCNC: 32.7 G/DL (ref 32–34.5)
MCV RBC AUTO: 92.3 FL (ref 80–99.9)
NITRITE UR QL STRIP: NEGATIVE
PH UR STRIP: 6.5 [PH] (ref 5–9)
PLATELET # BLD AUTO: 255 K/UL (ref 130–450)
PMV BLD AUTO: 9.5 FL (ref 7–12)
PROT UR STRIP-MCNC: NEGATIVE MG/DL
RBC # BLD AUTO: 4.67 M/UL (ref 3.5–5.5)
RBC #/AREA URNS HPF: NORMAL /HPF
SP GR UR STRIP: 1.01 (ref 1–1.03)
TRIGL SERPL-MCNC: 103 MG/DL
UROBILINOGEN UR STRIP-ACNC: 0.2 EU/DL (ref 0–1)
VLDLC SERPL CALC-MCNC: 21 MG/DL
WBC #/AREA URNS HPF: NORMAL /HPF
WBC OTHER # BLD: 5.4 K/UL (ref 4.5–11.5)

## 2023-08-29 PROCEDURE — 84520 ASSAY OF UREA NITROGEN: CPT

## 2023-08-29 PROCEDURE — 83735 ASSAY OF MAGNESIUM: CPT

## 2023-08-29 PROCEDURE — 82565 ASSAY OF CREATININE: CPT

## 2023-08-29 PROCEDURE — 81001 URINALYSIS AUTO W/SCOPE: CPT

## 2023-08-29 PROCEDURE — 85027 COMPLETE CBC AUTOMATED: CPT

## 2023-08-29 PROCEDURE — 36415 COLL VENOUS BLD VENIPUNCTURE: CPT

## 2023-08-29 PROCEDURE — 80061 LIPID PANEL: CPT

## 2023-09-05 ENCOUNTER — HOSPITAL ENCOUNTER (OUTPATIENT)
Dept: INFUSION THERAPY | Age: 63
Discharge: HOME | End: 2023-09-05

## 2023-09-05 ENCOUNTER — OFFICE VISIT (OUTPATIENT)
Dept: ONCOLOGY | Age: 63
End: 2023-09-05
Payer: COMMERCIAL

## 2023-09-05 VITALS
SYSTOLIC BLOOD PRESSURE: 102 MMHG | HEART RATE: 79 BPM | OXYGEN SATURATION: 98 % | HEIGHT: 66 IN | DIASTOLIC BLOOD PRESSURE: 58 MMHG | TEMPERATURE: 97.7 F | WEIGHT: 158.4 LBS | BODY MASS INDEX: 25.46 KG/M2

## 2023-09-05 DIAGNOSIS — M79.605 LEFT LEG PAIN: Primary | ICD-10-CM

## 2023-09-05 DIAGNOSIS — I26.92 ACUTE SADDLE PULMONARY EMBOLISM, UNSPECIFIED WHETHER ACUTE COR PULMONALE PRESENT (HCC): ICD-10-CM

## 2023-09-05 PROCEDURE — 99214 OFFICE O/P EST MOD 30 MIN: CPT | Performed by: INTERNAL MEDICINE

## 2023-09-06 ENCOUNTER — CLINICAL DOCUMENTATION (OUTPATIENT)
Facility: HOSPITAL | Age: 63
End: 2023-09-06

## 2023-09-06 NOTE — PROGRESS NOTES
Recvd email from clinic that patient needed asst from me for her eliquis, I asked that the Rx be sent to [facility] for them to run a claim since that is the process then will be able to assist    Waiting on response from clinic

## 2023-09-06 NOTE — PROGRESS NOTES
Realized that the pt can benefit from a copay card for her Rx, I will call her and let her know. When I called her I explained the copay card to her and showed her how to get one online, she was very appreciative of my call.  I told to her that if she ran into trouble getting card, to call me and I would walk her through this

## 2023-09-07 NOTE — PROGRESS NOTES
Patient refused printed AVS.   Pt verbalized understanding of follow up plan of care. All questions answered.
pulmonary pressures. The patient was started on heparin drip. subsequently had MRI of the abdomen with and without contrast done, revealing endings consistent with sidebranch IPMN configuration. Bilateral lower extremity venous ultrasound had revealed DVT in the left popliteal vein, the tibioperoneal trunk, which is nonocclusive, occlusive thrombosis in the left peroneal veins. COVID-19 testing was negative. Bilateral screening mammogram from 11/24/2021 was negative for malignancy. She is scheduled for a repeat mammogram, last coloscopy was in 2050, was recommended repeat coloscopy after 10 years, testing for inherited thrombophilia was ordered. The patient had a 5 to 6-hour car trip to [place] on November 8, returned on November 11, 5 to 6 hours each direction no recent surgeries. She received the COVID-vaccine on November 2. She is not on hormonal therapy. Her paternal grandmother had DVTs and PEs, her paternal cousin had a provoked PE. No history of miscarriages. The patient was diagnosed with left lower extremity DVT, and bilateral pulmonary emboli, the event was likely provoked by the long travel, and possibly the COVID 19 vaccine.   -Recommended testing for inherited thrombophilia and antiphospholipid antibody syndrome, results were reviewed with the patient, she does not have prothrombin gene or factor V Leiden gene mutations, no protein C, protein S or Antithrombin III deficiency, anticardiolipin and antibeta-2 glycoprotein antibodies, and lupus anticoagulant are negative.  -The patient is up-to-date with age-appropriate screening. -CT scans of the chest and MRI of the abdomen without evidence of malignancy.  -She has IPMN, the patient was seen by Dr. Roxanne Baez, plan for repeat MRI after 6 months.  -The patient is doing well clinically at this time, continue Eliquis, she had noted some forgetfulness, otherwise tolerating it well.   Recommended anticoagulation for at least 1

## 2023-10-27 ENCOUNTER — TELEPHONE (OUTPATIENT)
Dept: INFUSION THERAPY | Age: 63
End: 2023-10-27

## 2023-10-27 NOTE — TELEPHONE ENCOUNTER
Left patient a message regarding her Eliquis and her co-pay card as attempted yesterday to activate a new card due to her new insurance and obtaining a new prior authorization. Awaiting a return call to [phone].

## 2023-10-30 ENCOUNTER — TELEPHONE (OUTPATIENT)
Dept: INFUSION THERAPY | Age: 63
End: 2023-10-30

## 2023-10-30 NOTE — TELEPHONE ENCOUNTER
Patient returned call and stated she was able to speak to the Eliquis co-pay program and her pharmacy, who were able to use her old co-pay card and so she will  her refill sometime today and was grateful for our assistance regarding her new insurance. Voiced understanding and patient may return call to office if any further questions or concerns arise.

## 2023-11-01 DIAGNOSIS — D49.0 IPMN (INTRADUCTAL PAPILLARY MUCINOUS NEOPLASM): Primary | ICD-10-CM

## 2023-11-02 ENCOUNTER — TELEPHONE (OUTPATIENT)
Dept: HEMATOLOGY | Age: 63
End: 2023-11-02

## 2023-11-02 DIAGNOSIS — D49.0 IPMN (INTRADUCTAL PAPILLARY MUCINOUS NEOPLASM): Primary | ICD-10-CM

## 2023-11-02 DIAGNOSIS — F40.240 CLAUSTROPHOBIA: ICD-10-CM

## 2023-11-02 RX ORDER — DIAZEPAM 5 MG/1
TABLET ORAL
Qty: 2 TABLET | Refills: 0 | Status: SHIPPED | OUTPATIENT
Start: 2023-11-02 | End: 2024-01-01

## 2023-11-02 NOTE — TELEPHONE ENCOUNTER
No Richy Juni was required for cpt code 57228. I scheduled patient for her MRI on 12/27/23 at [facility] at 10:00am.  I called patient and I gave her the date, time and location for her MRI. I instructed her to arrive by 9:30am, NPO for 4-6 hours, wear no metal or jewelry. Get labs drawn after 11/27/23 at any [address] [place] [address] facility. I made her a follow up on 12/28/23 at [facility] at 12:30pm.  She verbalized understanding and she confirmed these appts.     Electronically signed by Robert Holden RN on 11/2/2023 at 9:23 AM

## 2023-11-30 ENCOUNTER — OFFICE VISIT (OUTPATIENT)
Dept: ORTHOPEDIC SURGERY | Age: 63
End: 2023-11-30

## 2023-11-30 DIAGNOSIS — M75.111 NONTRAUMATIC INCOMPLETE TEAR OF RIGHT ROTATOR CUFF: Primary | ICD-10-CM

## 2023-11-30 RX ORDER — BUPIVACAINE HYDROCHLORIDE 2.5 MG/ML
2 INJECTION, SOLUTION INFILTRATION; PERINEURAL ONCE
Status: COMPLETED | OUTPATIENT
Start: 2023-11-30 | End: 2023-11-30

## 2023-11-30 RX ORDER — TRIAMCINOLONE ACETONIDE 40 MG/ML
40 INJECTION, SUSPENSION INTRA-ARTICULAR; INTRAMUSCULAR ONCE
Status: COMPLETED | OUTPATIENT
Start: 2023-11-30 | End: 2023-11-30

## 2023-11-30 RX ADMIN — TRIAMCINOLONE ACETONIDE 40 MG: 40 INJECTION, SUSPENSION INTRA-ARTICULAR; INTRAMUSCULAR at 14:56

## 2023-11-30 RX ADMIN — BUPIVACAINE HYDROCHLORIDE 5 MG: 2.5 INJECTION, SOLUTION INFILTRATION; PERINEURAL at 14:55

## 2023-11-30 NOTE — PROGRESS NOTES
Follow Up Visit     HPI update 11/30/2023: Stanislaw Gorman is here today for repeat shoulder injection. We are still holding off on surgery due to her pulmonary embolus. She got good relief from her last injection. Denies any recent injuries. Denies fever and chills. Denies numbness tingling. PI update 6/22/2023: Stanislaw Gorman is here today for right shoulder pain. She been doing physical therapy and doing well with her injections but it has since worn off. She stopped her Eliquis 3 days ago for injection. She still has activity related shoulder pain worse with overhead issues. Overall no new complaints. No new injuries. Denies fever and chills. Denies numbness tingling. Tejal Hanks returns today for follow up visit regarding her right shoulder and right knee. Her last visit we injected her shoulder and started her in physical therapy. Since that time she was admitted to the hospital with bilateral pulmonary embolus. She is now taking Eliquis for treatment. Her shoulder pain improved with the injection. She really has not started doing her exercises due to her medical issues. Her knee pain is greatly improved. Physical Exam:     No data recorded    General: Alert and oriented x3, no acute distress  Cardiovascular/pulmonary: No labored breathing, peripheral perfusion intact  Musculoskeletal:    Right shoulder: Atraumatic. She has near full range of motion in forward flexion abduction and external rotation. She has limited internal rotation to L4. She has some weakness with resisted external and internal rotation. She has 4-5 strength with resisted shoulder abduction. She has positive impingement sign. She is tender palpation over AC joint.   Her elbow range of motion is full and unrestricted without any pain         Controlled Substances Monitoring:      Imaging:  No new imaging today      Assessment:   Right shoulder partial-thickness rotator cuff tearing with subacromial bursitis and AC joint

## 2023-12-06 ENCOUNTER — HOSPITAL ENCOUNTER (OUTPATIENT)
Dept: ULTRASOUND IMAGING | Age: 63
Discharge: HOME | End: 2023-12-08
Attending: INTERNAL MEDICINE
Payer: COMMERCIAL

## 2023-12-06 DIAGNOSIS — M79.605 LEFT LEG PAIN: ICD-10-CM

## 2023-12-06 PROCEDURE — 93971 EXTREMITY STUDY: CPT

## 2023-12-12 ENCOUNTER — HOSPITAL ENCOUNTER (OUTPATIENT)
Dept: INFUSION THERAPY | Age: 63
Discharge: HOME | End: 2023-12-12
Payer: COMMERCIAL

## 2023-12-12 DIAGNOSIS — I26.92 ACUTE SADDLE PULMONARY EMBOLISM, UNSPECIFIED WHETHER ACUTE COR PULMONALE PRESENT (HCC): ICD-10-CM

## 2023-12-12 LAB
ALBUMIN SERPL-MCNC: 3.8 G/DL (ref 3.5–5.2)
ALP SERPL-CCNC: 75 U/L (ref 35–104)
ALT SERPL-CCNC: 17 U/L (ref 0–32)
ANION GAP SERPL CALCULATED.3IONS-SCNC: 9 MMOL/L (ref 7–16)
AST SERPL-CCNC: 18 U/L (ref 0–31)
BASOPHILS # BLD: 0.04 K/UL (ref 0–0.2)
BASOPHILS NFR BLD: 1 % (ref 0–2)
BILIRUB SERPL-MCNC: 0.2 MG/DL (ref 0–1.2)
BUN SERPL-MCNC: 18 MG/DL (ref 6–23)
CALCIUM SERPL-MCNC: 8.7 MG/DL (ref 8.6–10.2)
CHLORIDE SERPL-SCNC: 107 MMOL/L (ref 98–107)
CO2 SERPL-SCNC: 25 MMOL/L (ref 22–29)
CREAT SERPL-MCNC: 1 MG/DL (ref 0.5–1)
EOSINOPHIL # BLD: 0.23 K/UL (ref 0.05–0.5)
EOSINOPHILS RELATIVE PERCENT: 3 % (ref 0–6)
ERYTHROCYTE [DISTWIDTH] IN BLOOD BY AUTOMATED COUNT: 12.4 % (ref 11.5–15)
GFR SERPL CREATININE-BSD FRML MDRD: >60 ML/MIN/1.73M2
GLUCOSE SERPL-MCNC: 87 MG/DL (ref 74–99)
HCT VFR BLD AUTO: 42.2 % (ref 34–48)
HGB BLD-MCNC: 13.4 G/DL (ref 11.5–15.5)
IMM GRANULOCYTES # BLD AUTO: <0.03 K/UL (ref 0–0.58)
IMM GRANULOCYTES NFR BLD: 0 % (ref 0–5)
LYMPHOCYTES NFR BLD: 2.04 K/UL (ref 1.5–4)
LYMPHOCYTES RELATIVE PERCENT: 27 % (ref 20–42)
MCH RBC QN AUTO: 29.7 PG (ref 26–35)
MCHC RBC AUTO-ENTMCNC: 31.8 G/DL (ref 32–34.5)
MCV RBC AUTO: 93.6 FL (ref 80–99.9)
MONOCYTES NFR BLD: 0.54 K/UL (ref 0.1–0.95)
MONOCYTES NFR BLD: 7 % (ref 2–12)
NEUTROPHILS NFR BLD: 62 % (ref 43–80)
NEUTS SEG NFR BLD: 4.76 K/UL (ref 1.8–7.3)
PLATELET # BLD AUTO: 260 K/UL (ref 130–450)
PMV BLD AUTO: 9.5 FL (ref 7–12)
POTASSIUM SERPL-SCNC: 4.1 MMOL/L (ref 3.5–5)
PROT SERPL-MCNC: 6.1 G/DL (ref 6.4–8.3)
RBC # BLD AUTO: 4.51 M/UL (ref 3.5–5.5)
SODIUM SERPL-SCNC: 141 MMOL/L (ref 132–146)
WBC OTHER # BLD: 7.6 K/UL (ref 4.5–11.5)

## 2023-12-12 PROCEDURE — 85025 COMPLETE CBC W/AUTO DIFF WBC: CPT

## 2023-12-12 PROCEDURE — 80053 COMPREHEN METABOLIC PANEL: CPT

## 2023-12-12 PROCEDURE — 36415 COLL VENOUS BLD VENIPUNCTURE: CPT

## 2024-01-03 ENCOUNTER — HOSPITAL ENCOUNTER (OUTPATIENT)
Dept: MRI IMAGING | Age: 64
Discharge: HOME | End: 2024-01-05
Payer: COMMERCIAL

## 2024-01-03 DIAGNOSIS — K86.2 PANCREATIC CYST: ICD-10-CM

## 2024-01-03 PROCEDURE — A9577 INJ MULTIHANCE: HCPCS | Performed by: RADIOLOGY

## 2024-01-03 PROCEDURE — 74183 MRI ABD W/O CNTR FLWD CNTR: CPT

## 2024-01-03 PROCEDURE — 6360000004 HC RX CONTRAST MEDICATION: Performed by: RADIOLOGY

## 2024-01-03 RX ADMIN — GADOBENATE DIMEGLUMINE 14 ML: 529 INJECTION, SOLUTION INTRAVENOUS at 16:11

## 2024-01-11 ENCOUNTER — OFFICE VISIT (OUTPATIENT)
Dept: HEMATOLOGY | Age: 64
End: 2024-01-11
Payer: COMMERCIAL

## 2024-01-11 VITALS
RESPIRATION RATE: 15 BRPM | WEIGHT: 159 LBS | HEART RATE: 73 BPM | TEMPERATURE: 97 F | HEIGHT: 66 IN | OXYGEN SATURATION: 96 % | SYSTOLIC BLOOD PRESSURE: 96 MMHG | DIASTOLIC BLOOD PRESSURE: 64 MMHG | BODY MASS INDEX: 25.55 KG/M2

## 2024-01-11 DIAGNOSIS — K86.2 PANCREATIC CYST: ICD-10-CM

## 2024-01-11 DIAGNOSIS — D49.0 IPMN (INTRADUCTAL PAPILLARY MUCINOUS NEOPLASM): Primary | ICD-10-CM

## 2024-01-11 PROCEDURE — 99212 OFFICE O/P EST SF 10 MIN: CPT | Performed by: TRANSPLANT SURGERY

## 2024-01-11 RX ORDER — ASPIRIN 81 MG/1
81 TABLET ORAL DAILY
COMMUNITY

## 2024-01-11 NOTE — PROGRESS NOTES
Hepatobiliary and Pancreatic Surgery Progress Note    CC: Pancreatic cyst    Subjective: Patient presented to the hospital in December 2022 with a PE and was placed on eliquis.  On her CTA it showed pancreatic lesions.  She had a MRI which showed 11 mm and 7 mm branch duct IPMN's.  She was placed on Eliquis for a year and recently stopped.  She denies any bloating or diarrhea.      Father's side of the family has had pancreatic cancer.    OBJECTIVE      Physical    BP 96/64   Pulse 73   Temp 97 °F (36.1 °C) (Temporal)   Resp 15   Ht 1.676 m (5' 6\")   Wt 72.1 kg (159 lb)   LMP 01/01/2006   SpO2 96%   BMI 25.66 kg/m²       General appearance: appears in no acute distress  Lungs:respiratory effort normal without accessory numbers  Heart: no pedal edema  Abdomen: soft, nondistended, nontympanic, no guarding, no peritoneal signs, normoactive bowel sounds  Extremities: ROM normal    ASSESSMENT: 11mm pancreatic head BD-IPMN and 7mm BD-IPMN in the pancreatic body    PLAN:    - I reviewed their images prior to our office visit   - We discussed that she does not have any worrisome features which is reassuring.  We discussed what worrisome features are.   -She is currently year 2/5  -Continue with yearly MRIs    10 Minutes of which greater than 50% was spent counseling or coordinating her care.    Thank you for the consultation and allowing me to take part in Ms. Guerrero's care.    Please send a copy of my note to Dr. CALIN Cloud    Electronically signed by Librado Shultz MD on 1/11/2024 at 1:41 PM

## 2024-02-22 ENCOUNTER — HOSPITAL ENCOUNTER (OUTPATIENT)
Dept: GENERAL RADIOLOGY | Age: 64
Discharge: HOME | End: 2024-02-24
Payer: COMMERCIAL

## 2024-02-22 VITALS — HEIGHT: 66 IN | BODY MASS INDEX: 25.66 KG/M2

## 2024-02-22 DIAGNOSIS — Z12.31 BREAST CANCER SCREENING BY MAMMOGRAM: ICD-10-CM

## 2024-02-22 PROCEDURE — 77063 BREAST TOMOSYNTHESIS BI: CPT

## 2024-03-11 ENCOUNTER — HOSPITAL ENCOUNTER (OUTPATIENT)
Age: 64
Discharge: HOME | End: 2024-03-11
Payer: COMMERCIAL

## 2024-03-11 LAB
ALBUMIN SERPL-MCNC: 3.9 G/DL (ref 3.5–5.2)
ALP SERPL-CCNC: 88 U/L (ref 35–104)
ALT SERPL-CCNC: 20 U/L (ref 0–32)
ANION GAP SERPL CALCULATED.3IONS-SCNC: 10 MMOL/L (ref 7–16)
AST SERPL-CCNC: 21 U/L (ref 0–31)
BILIRUB SERPL-MCNC: 0.5 MG/DL (ref 0–1.2)
BUN SERPL-MCNC: 23 MG/DL (ref 6–23)
CALCIUM SERPL-MCNC: 9.7 MG/DL (ref 8.6–10.2)
CHLORIDE SERPL-SCNC: 107 MMOL/L (ref 98–107)
CO2 SERPL-SCNC: 27 MMOL/L (ref 22–29)
CREAT SERPL-MCNC: 1.1 MG/DL (ref 0.5–1)
ERYTHROCYTE [DISTWIDTH] IN BLOOD BY AUTOMATED COUNT: 12.4 % (ref 11.5–15)
GFR SERPL CREATININE-BSD FRML MDRD: 54 ML/MIN/1.73M2
GLUCOSE SERPL-MCNC: 90 MG/DL (ref 74–99)
HCT VFR BLD AUTO: 44.2 % (ref 34–48)
HGB BLD-MCNC: 14.8 G/DL (ref 11.5–15.5)
MAGNESIUM SERPL-MCNC: 2.5 MG/DL (ref 1.6–2.6)
MCH RBC QN AUTO: 29.8 PG (ref 26–35)
MCHC RBC AUTO-ENTMCNC: 33.5 G/DL (ref 32–34.5)
MCV RBC AUTO: 88.9 FL (ref 80–99.9)
PLATELET # BLD AUTO: 270 K/UL (ref 130–450)
PMV BLD AUTO: 9.2 FL (ref 7–12)
POTASSIUM SERPL-SCNC: 4.3 MMOL/L (ref 3.5–5)
PROT SERPL-MCNC: 6.7 G/DL (ref 6.4–8.3)
RBC # BLD AUTO: 4.97 M/UL (ref 3.5–5.5)
SODIUM SERPL-SCNC: 144 MMOL/L (ref 132–146)
T4 FREE SERPL-MCNC: 1 NG/DL (ref 0.9–1.7)
TSH SERPL DL<=0.05 MIU/L-ACNC: 3.24 UIU/ML (ref 0.27–4.2)
WBC OTHER # BLD: 5.4 K/UL (ref 4.5–11.5)

## 2024-03-11 PROCEDURE — 84439 ASSAY OF FREE THYROXINE: CPT

## 2024-03-11 PROCEDURE — 85027 COMPLETE CBC AUTOMATED: CPT

## 2024-03-11 PROCEDURE — 80053 COMPREHEN METABOLIC PANEL: CPT

## 2024-03-11 PROCEDURE — 84443 ASSAY THYROID STIM HORMONE: CPT

## 2024-03-11 PROCEDURE — 36415 COLL VENOUS BLD VENIPUNCTURE: CPT

## 2024-03-11 PROCEDURE — 83735 ASSAY OF MAGNESIUM: CPT

## 2024-06-10 ENCOUNTER — HOSPITAL ENCOUNTER (OUTPATIENT)
Dept: GENERAL RADIOLOGY | Age: 64
Discharge: HOME | End: 2024-06-12
Payer: COMMERCIAL

## 2024-06-10 ENCOUNTER — HOSPITAL ENCOUNTER (OUTPATIENT)
Age: 64
Discharge: HOME | End: 2024-06-12
Payer: COMMERCIAL

## 2024-06-10 DIAGNOSIS — R52 PAIN: ICD-10-CM

## 2024-06-10 PROCEDURE — 73120 X-RAY EXAM OF HAND: CPT

## 2024-06-10 PROCEDURE — 73110 X-RAY EXAM OF WRIST: CPT

## 2024-10-17 ENCOUNTER — HOSPITAL ENCOUNTER (EMERGENCY)
Age: 64
Discharge: HOME | End: 2024-10-17
Attending: EMERGENCY MEDICINE
Payer: COMMERCIAL

## 2024-10-17 ENCOUNTER — APPOINTMENT (OUTPATIENT)
Dept: GENERAL RADIOLOGY | Age: 64
End: 2024-10-17
Payer: COMMERCIAL

## 2024-10-17 VITALS
HEART RATE: 89 BPM | RESPIRATION RATE: 18 BRPM | TEMPERATURE: 98.2 F | OXYGEN SATURATION: 94 % | DIASTOLIC BLOOD PRESSURE: 57 MMHG | SYSTOLIC BLOOD PRESSURE: 104 MMHG

## 2024-10-17 DIAGNOSIS — U07.1 COVID-19: Primary | ICD-10-CM

## 2024-10-17 LAB
FLUAV RNA RESP QL NAA+PROBE: NOT DETECTED
FLUBV RNA RESP QL NAA+PROBE: NOT DETECTED
SARS-COV-2 RNA RESP QL NAA+PROBE: DETECTED
SOURCE: ABNORMAL
SPECIMEN DESCRIPTION: ABNORMAL

## 2024-10-17 PROCEDURE — 99284 EMERGENCY DEPT VISIT MOD MDM: CPT

## 2024-10-17 PROCEDURE — 87636 SARSCOV2 & INF A&B AMP PRB: CPT

## 2024-10-17 PROCEDURE — 71045 X-RAY EXAM CHEST 1 VIEW: CPT

## 2024-10-17 RX ORDER — BENZONATATE 100 MG/1
100 CAPSULE ORAL 2 TIMES DAILY PRN
Qty: 20 CAPSULE | Refills: 0 | Status: SHIPPED | OUTPATIENT
Start: 2024-10-17 | End: 2024-10-24

## 2024-10-17 ASSESSMENT — PAIN SCALES - GENERAL: PAINLEVEL_OUTOF10: 10

## 2024-10-17 ASSESSMENT — PAIN DESCRIPTION - ONSET: ONSET: ON-GOING

## 2024-10-17 ASSESSMENT — LIFESTYLE VARIABLES
HOW MANY STANDARD DRINKS CONTAINING ALCOHOL DO YOU HAVE ON A TYPICAL DAY: PATIENT DOES NOT DRINK
HOW OFTEN DO YOU HAVE A DRINK CONTAINING ALCOHOL: NEVER

## 2024-10-17 ASSESSMENT — PAIN DESCRIPTION - DESCRIPTORS: DESCRIPTORS: ACHING;SORE;DISCOMFORT

## 2024-10-17 ASSESSMENT — PAIN DESCRIPTION - FREQUENCY: FREQUENCY: CONTINUOUS

## 2024-10-17 ASSESSMENT — PAIN DESCRIPTION - LOCATION: LOCATION: GENERALIZED

## 2024-10-17 ASSESSMENT — PAIN - FUNCTIONAL ASSESSMENT: PAIN_FUNCTIONAL_ASSESSMENT: 0-10

## 2024-10-17 ASSESSMENT — PAIN DESCRIPTION - PAIN TYPE: TYPE: ACUTE PAIN

## 2024-10-17 NOTE — ED PROVIDER NOTES
HPI:  10/17/24,   Time: 2:12 PM EDT       Lynnette Guerrero is a 64 y.o. female presenting to the ED for cough/congestion/pos home covid test, beginning 2 day ago.  The complaint has been persistent, mild in severity, and worsened by nothing.  Fevers at home.  Home test positive for COVID.  Told to come by PCP.  Tolerating p.o.    Review of Systems:   Pertinent positives and negatives are stated within HPI, all other systems reviewed and are negative.          --------------------------------------------- PAST HISTORY ---------------------------------------------  Past Medical History:  has a past medical history of Abnormal Pap smear of cervix, Arthritis, Chronic back pain, Depression, GALEANA (dyspnea on exertion), DVT (deep venous thrombosis) ([facility]), Gastritis, GERD (gastroesophageal reflux disease), Hiatal hernia, Osteoarthritis, Palpitations, Partial tear of rotator cuff, PE (pulmonary thromboembolism) ([facility]), PONV (postoperative nausea and vomiting), Shingles, Syncope, and TMJ (dislocation of temporomandibular joint).    Past Surgical History:  has a past surgical history that includes Cholecystectomy, laparoscopic (2003); Appendectomy (1970); LASIK (1998); Colonoscopy (03/11/2015); Colonoscopy; Endometrial biopsy (2011); laparoscopy (1989); Endoscopy, colon, diagnostic (2003, 2015); hysteroscopy (09/08/2016); Cervical polyp removal; Dilation and curettage of uterus; [place] tooth extraction (1983); Tonsillectomy and adenoidectomy (1985); Upper gastrointestinal endoscopy (N/A, 7/26/2023); and Colonoscopy (N/A, 7/26/2023).    Social History:  reports that she quit smoking about 43 years ago. Her smoking use included cigarettes. She started smoking about 44 years ago. She has never used smokeless tobacco. She reports that she does not drink alcohol and does not use drugs.    Family History: family history includes Arthritis in her father, maternal aunt, mother, and paternal aunt; Asthma in her brother and maternal uncle;

## 2024-12-18 ENCOUNTER — TELEPHONE (OUTPATIENT)
Dept: HEMATOLOGY | Age: 64
End: 2024-12-18

## 2024-12-18 DIAGNOSIS — D49.0 IPMN (INTRADUCTAL PAPILLARY MUCINOUS NEOPLASM): Primary | ICD-10-CM

## 2024-12-18 DIAGNOSIS — F40.240 CLAUSTROPHOBIA: ICD-10-CM

## 2024-12-18 RX ORDER — DIAZEPAM 5 MG/1
TABLET ORAL
Qty: 2 TABLET | Refills: 0 | Status: SHIPPED | OUTPATIENT
Start: 2024-12-18 | End: 2025-01-31

## 2024-12-18 NOTE — TELEPHONE ENCOUNTER
I scheduled patient for her MRI on 1/16/25 at [facility] at 10:00am.  I called patient and she was given the date,time and location.  I instructed her to arrive by 9:30am, NPO 4-6 hours and wear no metal or jewelry.  I also made her aware that she will need labs drawn prior to the MRI.  I made her a follow up appt at the [facility] clinic at [facility] on 1/23/25 at 1:00pm.  She verbalized understanding and she confirmed these appts.  I will send over Valium script in for her claustrophobia to take prior to the MRI.     Auth#[id number]  12/10/24-1/24/25    Electronically signed by Radha Chow RN on 12/18/2024 at 9:17 AM

## 2025-01-15 ENCOUNTER — HOSPITAL ENCOUNTER (OUTPATIENT)
Age: 65
Discharge: HOME | End: 2025-01-15
Payer: COMMERCIAL

## 2025-01-15 DIAGNOSIS — D49.0 IPMN (INTRADUCTAL PAPILLARY MUCINOUS NEOPLASM): ICD-10-CM

## 2025-01-15 LAB
ANION GAP SERPL CALCULATED.3IONS-SCNC: 8 MMOL/L (ref 7–16)
BUN SERPL-MCNC: 20 MG/DL (ref 6–23)
BUN SERPL-MCNC: 20 MG/DL (ref 6–23)
CHLORIDE SERPL-SCNC: 108 MMOL/L (ref 98–107)
CHOLEST SERPL-MCNC: 186 MG/DL
CO2 SERPL-SCNC: 27 MMOL/L (ref 22–29)
CREAT SERPL-MCNC: 1.1 MG/DL (ref 0.5–1)
CREAT SERPL-MCNC: 1.1 MG/DL (ref 0.5–1)
GFR, ESTIMATED: 55 ML/MIN/1.73M2
GFR, ESTIMATED: 56 ML/MIN/1.73M2
HDLC SERPL-MCNC: 56 MG/DL
LDLC SERPL CALC-MCNC: 111 MG/DL
MAGNESIUM SERPL-MCNC: 2.2 MG/DL (ref 1.6–2.6)
POTASSIUM SERPL-SCNC: 4.2 MMOL/L (ref 3.5–5)
SODIUM SERPL-SCNC: 143 MMOL/L (ref 132–146)
TRIGL SERPL-MCNC: 97 MG/DL
VLDLC SERPL CALC-MCNC: 19 MG/DL

## 2025-01-15 PROCEDURE — 84520 ASSAY OF UREA NITROGEN: CPT

## 2025-01-15 PROCEDURE — 36415 COLL VENOUS BLD VENIPUNCTURE: CPT

## 2025-01-15 PROCEDURE — 83735 ASSAY OF MAGNESIUM: CPT

## 2025-01-15 PROCEDURE — 82565 ASSAY OF CREATININE: CPT

## 2025-01-15 PROCEDURE — 80051 ELECTROLYTE PANEL: CPT

## 2025-01-15 PROCEDURE — 80061 LIPID PANEL: CPT

## 2025-01-16 ENCOUNTER — HOSPITAL ENCOUNTER (OUTPATIENT)
Dept: MRI IMAGING | Age: 65
Discharge: HOME | End: 2025-01-18
Attending: TRANSPLANT SURGERY
Payer: COMMERCIAL

## 2025-01-16 DIAGNOSIS — D49.0 IPMN (INTRADUCTAL PAPILLARY MUCINOUS NEOPLASM): ICD-10-CM

## 2025-01-16 PROCEDURE — 74183 MRI ABD W/O CNTR FLWD CNTR: CPT

## 2025-01-16 PROCEDURE — 6360000004 HC RX CONTRAST MEDICATION: Performed by: RADIOLOGY

## 2025-01-16 PROCEDURE — A9579 GAD-BASE MR CONTRAST NOS,1ML: HCPCS | Performed by: RADIOLOGY

## 2025-01-16 RX ADMIN — GADOTERIDOL 15 ML: 279.3 INJECTION, SOLUTION INTRAVENOUS at 10:55

## 2025-01-23 ENCOUNTER — OFFICE VISIT (OUTPATIENT)
Dept: HEMATOLOGY | Age: 65
End: 2025-01-23
Payer: COMMERCIAL

## 2025-01-23 VITALS
OXYGEN SATURATION: 94 % | BODY MASS INDEX: 26.84 KG/M2 | DIASTOLIC BLOOD PRESSURE: 54 MMHG | SYSTOLIC BLOOD PRESSURE: 100 MMHG | WEIGHT: 167 LBS | HEART RATE: 89 BPM | HEIGHT: 66 IN | RESPIRATION RATE: 15 BRPM | TEMPERATURE: 97 F

## 2025-01-23 DIAGNOSIS — D49.0 IPMN (INTRADUCTAL PAPILLARY MUCINOUS NEOPLASM): Primary | ICD-10-CM

## 2025-01-23 DIAGNOSIS — R10.12 LUQ PAIN: ICD-10-CM

## 2025-01-23 PROCEDURE — 1036F TOBACCO NON-USER: CPT | Performed by: TRANSPLANT SURGERY

## 2025-01-23 PROCEDURE — G8419 CALC BMI OUT NRM PARAM NOF/U: HCPCS | Performed by: TRANSPLANT SURGERY

## 2025-01-23 PROCEDURE — G8427 DOCREV CUR MEDS BY ELIG CLIN: HCPCS | Performed by: TRANSPLANT SURGERY

## 2025-01-23 PROCEDURE — 99212 OFFICE O/P EST SF 10 MIN: CPT | Performed by: TRANSPLANT SURGERY

## 2025-01-23 PROCEDURE — 3017F COLORECTAL CA SCREEN DOC REV: CPT | Performed by: TRANSPLANT SURGERY

## 2025-01-23 NOTE — PROGRESS NOTES
Hepatobiliary and Pancreatic Surgery Progress Note    CC: Pancreatic cyst    Subjective: Patient presented to the hospital in December 2022 with a PE and was placed on eliquis.  She is now off Eliquis.  She had a MRI which showed 11 mm and 7 mm branch duct IPMN's.  She currently denies any abdominal pain, bloating, nor loose stools.    Father's side of the family has had pancreatic cancer.    OBJECTIVE      Physical    BP (!) 100/54   Pulse 89   Temp 97 °F (36.1 °C)   Resp 15   Ht 1.676 m (5' 6\")   Wt 75.8 kg (167 lb)   LMP 01/01/2006   SpO2 94%   BMI 26.95 kg/m²       General appearance: appears in no acute distress  Lungs:respiratory effort normal without accessory numbers  Heart: no pedal edema  Abdomen: soft, nondistended, nontympanic, no guarding, no peritoneal signs, normoactive bowel sounds  Extremities: ROM normal    ASSESSMENT: 11mm pancreatic head BD-IPMN and 7mm BD-IPMN in the pancreatic body    PLAN:    - I reviewed their images prior to our office visit   - We discussed that she does not have any worrisome features which is reassuring.  We discussed what worrisome features are.   -She is currently year 3/5  -Continue with yearly MRIs    10 Minutes of which greater than 50% was spent counseling or coordinating her care.    Thank you for the consultation and allowing me to take part in Ms. Guerrero's care.    Please send a copy of my note to Dr. CALIN Cloud    Electronically signed by Librado Shultz MD on 1/23/2025 at 1:26 PM

## 2025-01-23 NOTE — PATIENT INSTRUCTIONS
Staff members for Dr Shultz, Dr Camahco, and Shamar Campbell NP can be reached directly at [phone]

## 2025-02-10 ENCOUNTER — TRANSCRIBE ORDERS (OUTPATIENT)
Dept: ADMINISTRATIVE | Age: 65
End: 2025-02-10

## 2025-02-10 DIAGNOSIS — N20.0 CALCULUS OF KIDNEY: Primary | ICD-10-CM

## 2025-02-13 ENCOUNTER — OFFICE VISIT (OUTPATIENT)
Dept: SURGERY | Age: 65
End: 2025-02-13
Payer: COMMERCIAL

## 2025-02-13 VITALS
WEIGHT: 166 LBS | HEART RATE: 66 BPM | HEIGHT: 66 IN | DIASTOLIC BLOOD PRESSURE: 66 MMHG | OXYGEN SATURATION: 99 % | BODY MASS INDEX: 26.68 KG/M2 | TEMPERATURE: 97.6 F | RESPIRATION RATE: 20 BRPM | SYSTOLIC BLOOD PRESSURE: 117 MMHG

## 2025-02-13 DIAGNOSIS — R22.2 MASS OF RIGHT CHEST WALL: ICD-10-CM

## 2025-02-13 DIAGNOSIS — R22.2 MASS OF CHEST WALL, LEFT: ICD-10-CM

## 2025-02-13 DIAGNOSIS — R07.89 LEFT-SIDED CHEST WALL PAIN: Primary | ICD-10-CM

## 2025-02-13 PROCEDURE — 3017F COLORECTAL CA SCREEN DOC REV: CPT | Performed by: SURGERY

## 2025-02-13 PROCEDURE — 99212 OFFICE O/P EST SF 10 MIN: CPT | Performed by: SURGERY

## 2025-02-13 PROCEDURE — G8419 CALC BMI OUT NRM PARAM NOF/U: HCPCS | Performed by: SURGERY

## 2025-02-13 PROCEDURE — 99214 OFFICE O/P EST MOD 30 MIN: CPT | Performed by: SURGERY

## 2025-02-13 PROCEDURE — G8427 DOCREV CUR MEDS BY ELIG CLIN: HCPCS | Performed by: SURGERY

## 2025-02-13 PROCEDURE — 1036F TOBACCO NON-USER: CPT | Performed by: SURGERY

## 2025-02-13 NOTE — PROGRESS NOTES
Chief Complaint   Patient presents with    Abdominal Pain     New - Pt c/o RUQ abd pain, left sided abd pain accompanied by a bulge. Pt denies nausea/vomiting, appetite, constipation diarrhea. Pt states she noticed the bulge about a month ago, more noticeable during the evening has not grown in size.         Assessment:  1. Left-sided chest wall pain    2. Mass of chest wall, left    3. Mass of right chest wall    Unclear etiology-differential diagnosis includes musculoskeletal strain, costochondritis, soft tissue mass    Plan:  Soft tissue ultrasound of left lateral chest wall and right anterior inferior chest wall        Subjective:  Lynnette presents today with complaints of right chest wall pain and swelling that occurred in December and has since improved.  She also noted a painful left anterolateral chest wall area that occurred after she was shoveling snow at her father's home.  She states that that area became firm and tender to touch and appeared to have a linear to triangular shape.  This has began to resolve.    She did have some urinary frequency and hematuria and saw Dr. Elfego Farrell.  She has a known kidney stone.  He ordered a KUB and ultrasound that she is having done next week.    She denies any nausea, vomiting, change in her bowel habits, or abdominal pain    Objective:   /66 (Site: Right Upper Arm, Position: Sitting, Cuff Size: Small Adult)   Pulse 66   Temp 97.6 °F (36.4 °C) (Temporal)   Resp 20   Ht 1.676 m (5' 6\")   Wt 75.3 kg (166 lb)   LMP 01/01/2006   SpO2 99%   BMI 26.79 kg/m²   General-64-year-old white woman no acute distress  Chest-no palpable mass or significant tenderness over the right or left costal margins  Abdomen-soft, nondistended, nontender

## 2025-02-19 ENCOUNTER — HOSPITAL ENCOUNTER (OUTPATIENT)
Dept: GENERAL RADIOLOGY | Age: 65
Discharge: HOME | End: 2025-02-21

## 2025-02-19 ENCOUNTER — HOSPITAL ENCOUNTER (OUTPATIENT)
Age: 65
Discharge: HOME | End: 2025-02-21

## 2025-02-19 DIAGNOSIS — N20.0 CALCULUS OF KIDNEY: ICD-10-CM

## 2025-02-21 ENCOUNTER — HOSPITAL ENCOUNTER (OUTPATIENT)
Dept: GENERAL RADIOLOGY | Age: 65
Discharge: HOME | End: 2025-02-23
Payer: COMMERCIAL

## 2025-02-21 ENCOUNTER — HOSPITAL ENCOUNTER (OUTPATIENT)
Age: 65
Discharge: HOME | End: 2025-02-23
Payer: COMMERCIAL

## 2025-02-21 DIAGNOSIS — N20.0 CALCULUS OF KIDNEY: ICD-10-CM

## 2025-02-21 PROCEDURE — 74018 RADEX ABDOMEN 1 VIEW: CPT

## 2025-02-24 ENCOUNTER — HOSPITAL ENCOUNTER (OUTPATIENT)
Dept: GENERAL RADIOLOGY | Age: 65
Discharge: HOME | End: 2025-02-26
Payer: COMMERCIAL

## 2025-02-24 VITALS — WEIGHT: 166 LBS | HEIGHT: 66 IN | BODY MASS INDEX: 26.68 KG/M2

## 2025-02-24 DIAGNOSIS — Z12.31 BREAST CANCER SCREENING BY MAMMOGRAM: ICD-10-CM

## 2025-02-24 PROCEDURE — 77063 BREAST TOMOSYNTHESIS BI: CPT

## 2025-02-25 ENCOUNTER — HOSPITAL ENCOUNTER (OUTPATIENT)
Dept: ULTRASOUND IMAGING | Age: 65
Discharge: HOME | End: 2025-02-27
Attending: SURGERY
Payer: COMMERCIAL

## 2025-02-25 ENCOUNTER — APPOINTMENT (OUTPATIENT)
Dept: ULTRASOUND IMAGING | Age: 65
End: 2025-02-25
Attending: UROLOGY
Payer: COMMERCIAL

## 2025-02-25 DIAGNOSIS — R22.2 MASS OF RIGHT CHEST WALL: ICD-10-CM

## 2025-02-25 DIAGNOSIS — R22.2 MASS OF CHEST WALL, LEFT: ICD-10-CM

## 2025-02-25 DIAGNOSIS — R07.89 LEFT-SIDED CHEST WALL PAIN: ICD-10-CM

## 2025-02-25 PROCEDURE — 76999 ECHO EXAMINATION PROCEDURE: CPT

## 2025-09-04 ENCOUNTER — HOSPITAL ENCOUNTER (OUTPATIENT)
Dept: GENERAL RADIOLOGY | Age: 65
Discharge: HOME | End: 2025-09-06
Payer: COMMERCIAL

## 2025-09-04 ENCOUNTER — HOSPITAL ENCOUNTER (OUTPATIENT)
Dept: LAB | Age: 65
Discharge: HOME | End: 2025-09-04
Payer: COMMERCIAL

## 2025-09-04 DIAGNOSIS — M79.672 LEFT FOOT PAIN: ICD-10-CM

## 2025-09-04 DIAGNOSIS — M25.571 ARTHRALGIA OF RIGHT ANKLE: ICD-10-CM

## 2025-09-04 LAB
ALBUMIN SERPL-MCNC: 4.3 G/DL (ref 3.5–5.2)
ALP SERPL-CCNC: 87 U/L (ref 35–104)
ALT SERPL-CCNC: 31 U/L (ref 0–35)
ANION GAP SERPL CALCULATED.3IONS-SCNC: 11 MMOL/L (ref 7–16)
AST SERPL-CCNC: 29 U/L (ref 0–35)
BILIRUB SERPL-MCNC: 0.6 MG/DL (ref 0–1.2)
BUN SERPL-MCNC: 20 MG/DL (ref 8–23)
CALCIUM SERPL-MCNC: 9.5 MG/DL (ref 8.8–10.2)
CHLORIDE SERPL-SCNC: 105 MMOL/L (ref 98–107)
CHOLEST SERPL-MCNC: 200 MG/DL
CO2 SERPL-SCNC: 25 MMOL/L (ref 22–29)
CREAT SERPL-MCNC: 1 MG/DL (ref 0.5–1)
CRP SERPL HS-MCNC: <3 MG/L (ref 0–5)
ERYTHROCYTE [DISTWIDTH] IN BLOOD BY AUTOMATED COUNT: 12.1 % (ref 11.5–15)
ERYTHROCYTE [SEDIMENTATION RATE] IN BLOOD BY WESTERGREN METHOD: 6 MM/HR (ref 0–20)
GFR, ESTIMATED: 62 ML/MIN/1.73M2
GLUCOSE SERPL-MCNC: 97 MG/DL (ref 74–99)
HCT VFR BLD AUTO: 41 % (ref 34–48)
HDLC SERPL-MCNC: 59 MG/DL
HGB BLD-MCNC: 13.9 G/DL (ref 11.5–15.5)
LDLC SERPL CALC-MCNC: 119 MG/DL
MCH RBC QN AUTO: 29.8 PG (ref 26–35)
MCHC RBC AUTO-ENTMCNC: 33.9 G/DL (ref 32–34.5)
MCV RBC AUTO: 88 FL (ref 80–99.9)
PLATELET # BLD AUTO: 273 K/UL (ref 130–450)
PMV BLD AUTO: 9.4 FL (ref 7–12)
POTASSIUM SERPL-SCNC: 4.4 MMOL/L (ref 3.5–5.1)
PROT SERPL-MCNC: 7 G/DL (ref 6.4–8.3)
RBC # BLD AUTO: 4.66 M/UL (ref 3.5–5.5)
SODIUM SERPL-SCNC: 141 MMOL/L (ref 136–145)
TRIGL SERPL-MCNC: 113 MG/DL
VLDLC SERPL CALC-MCNC: 23 MG/DL
WBC OTHER # BLD: 10.5 K/UL (ref 4.5–11.5)

## 2025-09-04 PROCEDURE — 86140 C-REACTIVE PROTEIN: CPT

## 2025-09-04 PROCEDURE — 80053 COMPREHEN METABOLIC PANEL: CPT

## 2025-09-04 PROCEDURE — 80061 LIPID PANEL: CPT

## 2025-09-04 PROCEDURE — 73610 X-RAY EXAM OF ANKLE: CPT

## 2025-09-04 PROCEDURE — 36415 COLL VENOUS BLD VENIPUNCTURE: CPT

## 2025-09-04 PROCEDURE — 73630 X-RAY EXAM OF FOOT: CPT

## 2025-09-04 PROCEDURE — 83516 IMMUNOASSAY NONANTIBODY: CPT

## 2025-09-04 PROCEDURE — 85027 COMPLETE CBC AUTOMATED: CPT

## 2025-09-04 PROCEDURE — 86039 ANTINUCLEAR ANTIBODIES (ANA): CPT

## 2025-09-04 PROCEDURE — 86038 ANTINUCLEAR ANTIBODIES: CPT

## 2025-09-04 PROCEDURE — 85652 RBC SED RATE AUTOMATED: CPT

## 2025-09-05 LAB — ANA SER QL IA: NEGATIVE

## (undated) DEVICE — BITEBLOCK 54FR W/ DENT RIM BLOX

## (undated) DEVICE — CONTAINER SPEC 60ML PH 7NEUTRAL BUFF FRMLN RDY TO USE

## (undated) DEVICE — CONNECTOR IRRIGATION AUXILIARY H2O JET W/ PRT MTL THRD HYDR

## (undated) DEVICE — DEFENDO AIR WATER SUCTION AND BIOPSY VALVE KIT FOR  OLYMPUS: Brand: DEFENDO AIR/WATER/SUCTION AND BIOPSY VALVE

## (undated) DEVICE — GAUZE,SPONGE,4"X4",8PLY,STRL,LF,10/TRAY: Brand: MEDLINE

## (undated) DEVICE — FORCEPS BX L160CM JAW DIA2.4MM YEL L CAP W/ NDL DISP RAD